# Patient Record
Sex: MALE | Race: WHITE | NOT HISPANIC OR LATINO | Employment: OTHER | URBAN - METROPOLITAN AREA
[De-identification: names, ages, dates, MRNs, and addresses within clinical notes are randomized per-mention and may not be internally consistent; named-entity substitution may affect disease eponyms.]

---

## 2017-02-06 ENCOUNTER — OFFICE VISIT (OUTPATIENT)
Dept: FAMILY MEDICINE | Facility: CLINIC | Age: 72
End: 2017-02-06
Payer: COMMERCIAL

## 2017-02-06 VITALS
DIASTOLIC BLOOD PRESSURE: 74 MMHG | WEIGHT: 237 LBS | HEART RATE: 50 BPM | HEIGHT: 70 IN | TEMPERATURE: 98 F | BODY MASS INDEX: 33.93 KG/M2 | SYSTOLIC BLOOD PRESSURE: 142 MMHG

## 2017-02-06 DIAGNOSIS — T50.905A MEDICATION REACTION, INITIAL ENCOUNTER: Primary | ICD-10-CM

## 2017-02-06 PROCEDURE — 99213 OFFICE O/P EST LOW 20 MIN: CPT | Performed by: FAMILY MEDICINE

## 2017-02-06 NOTE — MR AVS SNAPSHOT
After Visit Summary   2/6/2017    Cas Noriega    MRN: [id number]           Patient Information     Date Of Birth          1945        Visit Information        Provider Department      2/6/2017 4:00 PM Bryce Leigh MD [facility]        Care Instructions    1. Stop the cream for now.    2. Come in one month fasting for skin and exam.    3. Use your regular cream for now.        Follow-ups after your visit        Your next 10 appointments already scheduled     Feb 22, 2017 12:00 PM   (Arrive by 11:45 AM)   Return Visit with Mickey Turpin MD   [facility] Ear Nose and Throat ([facility] [place])    [address]  4th [address]  [place] [address] [phone]            Apr 19, 2017  8:45 AM   LAB with [place] [facility]   [facility] ([facility])    [address] [address]   [phone]           Patient must bring picture ID.  Patient should be prepared to give a urine specimen  Please do not eat 10-12 hours before your appointment if you are coming in fasting for labs on lipids, cholesterol, or glucose (sugar).  Pregnant women should follow their Care Team instructions. Water with medications is okay. Do not drink coffee or other fluids.   If you have concerns about taking  your medications, please ask at office or if scheduling via Metaversumhart, send a message by clicking on Secure Messaging, Message Your Care Team.            Apr 19, 2017  9:00 AM   NM MPI WITH LEXISCAN with [facility], [place] STRESS TEST   [facility] Nuclear Medicine ([facility])    [address] [address]   [phone]           For a ONE day exam: Allow 3-4 hours for test. For a TWO day exam: Allow 2 hours PER day for test.  You may need to stop some medicines before the test. Follow your doctor s orders. - If you take a beta blocker: Follow your doctor s specific instructions on taking it  prior to and on the day of your exam. - If you take Aggrenox or dipyridamole (Persantine, Permole), stop taking it 48 hours before your test. - If you take Viagra, Cialis or Levitra, stop taking it 48 hours before your test. - If you take theophylline or aminophylline, stop taking it 12 hours before your test.  For patients with diabetes: - If you take insulin, call your diabetes care team. Ask if you should take a 1/2 dose the morning of your test. - If you take diabetes medicine by mouth, don t take it on the morning of your test. Bring it with you to take after the test. (If you have questions, call your diabetes care team.)  Do not take nitrates on the day of your test. Do not wear your Nitro-Patch.  Stop all caffeine 12 hours before the test. This includes coffee, tea, soda pop, chocolate and certain medicines (such as Anacin, Excedrin and NoDoz). Also avoid decaf coffee and tea, as these contain small amounts of caffeine.  No alcohol, smoking or other tobacco for 12 hours before the test.  Stop eating 3 hours before the test. You may drink water.  Please wear a loose two-piece outfit. If you will have an exercise test, bring rubber-soled walking shoes.  When you arrive, please tell us if you: - Have diabetes - Are breastfeeding - May be pregnant - Have a pacemaker of ICD (implantable defibrillator).  Please call your Imaging Department at your exam site with any questions.            Apr 26, 2017  2:30 PM   Return Visit with Donny Hua MD   [facility] PHYSICIAN HEART AT [facility] ([facility])    [address] [place] [address]   [phone]            May 10, 2017  9:30 AM   (Arrive by 9:15 AM)   Return Visit with Mickey Turpin MD   [facility] Ear Nose and Throat ([facility] [place])    [address] [address] [phone]              Who to contact     If you have questions or need follow up information  "about today's clinic visit or your schedule please contact [facility] directly at [phone].  Normal or non-critical lab and imaging results will be communicated to you by MyChart, letter or phone within 4 business days after the clinic has received the results. If you do not hear from us within 7 days, please contact the clinic through Offeramahart or phone. If you have a critical or abnormal lab result, we will notify you by phone as soon as possible.  Submit refill requests through [employer]pper or call your pharmacy and they will forward the refill request to us. Please allow 3 business days for your refill to be completed.          Additional Information About Your Visit        [email or web address] Information     [employer]pper gives you secure access to your electronic health record. If you see a primary care provider, you can also send messages to your care team and make appointments. If you have questions, please call your primary care clinic.  If you do not have a primary care provider, please call [phone] and they will assist you.        Care EveryWhere ID     This is your Care EveryWhere ID. This could be used by other organizations to access your [facility] medical records  [id number]        Your Vitals Were     Pulse Temperature Height BMI (Body Mass Index)          50 98  F (36.7  C) (Tympanic) 5' 10\" (1.778 m) 34.01 kg/m2         Blood Pressure from Last 3 Encounters:   02/06/17 142/74   11/28/16 138/72   10/31/16 127/66    Weight from Last 3 Encounters:   02/06/17 237 lb (107.502 kg)   11/28/16 232 lb (105.235 kg)   10/31/16 228 lb (103.42 kg)              Today, you had the following     No orders found for display       Primary Care Provider Office Phone # Fax #    Xu Wells PA-C [phone]       [facility] [address] [address]        Thank you!     Thank you for choosing [facility]  for your care. Our goal is always to provide you " with excellent care. Hearing back from our patients is one way we can continue to improve our services. Please take a few minutes to complete the written survey that you may receive in the mail after your visit with us. Thank you!             Your Updated Medication List - Protect others around you: Learn how to safely use, store and throw away your medicines at www.disposemymeds.org.          This list is accurate as of: 2/6/17  4:39 PM.  Always use your most recent med list.                   Brand Name Dispense Instructions for use    ACETAMINOPHEN PO      Take 500 mg by mouth 2 every 6 hours, morning, noon and night. As Needed for shoulder.       albuterol 108 (90 BASE) MCG/ACT Inhaler    PROAIR HFA/PROVENTIL HFA/VENTOLIN HFA    1 Inhaler    Inhale 2 puffs into the lungs every 6 hours as needed for shortness of breath / dyspnea or wheezing       aspirin 325 MG EC tablet      one daily       atenolol 50 MG tablet    TENORMIN    90 tablet    Take 1 tablet (50 mg) by mouth daily       atorvastatin 80 MG tablet    LIPITOR    90 tablet    Take 1 tablet (80 mg) by mouth daily       carbidopa-levodopa  MG per tablet    SINEMET    180 tablet    Take 2 tablets by mouth daily At bedtime.  Written as 90 day supply.       cefadroxil 500 MG capsule    DURICEF    60 capsule    Take 1 capsule (500 mg) by mouth 2 times daily       DIPHENHYDRAMINE HCL PO      Take by mouth daily       fluorouracil 5 % cream    EFUDEX    40 g    Apply topically 2 times daily       fluticasone 50 MCG/ACT spray    FLONASE    48 g    Spray 1-2 sprays into both nostrils daily       GLUCOSAMINE CHONDR 1500 COMPLX PO      glucosamine(1500mg)plus chondroitin(1200mg) takes 1 tablet daily       hydrochlorothiazide 25 MG tablet    HYDRODIURIL    90 tablet    Take 1 tablet (25 mg) by mouth every morning       * lactobacillus rhamnosus (GG) capsule     90 capsule    Take 1 capsule by mouth daily       * lactobacillus rhamnosus (GG) capsule     90  capsule    Take 1 capsule by mouth daily       lisinopril 40 MG tablet    PRINIVIL/ZESTRIL    90 tablet    Take 1 tablet (40 mg) by mouth daily       nitroglycerin 0.4 MG sublingual tablet    NITROSTAT    30 tablet    Place 1 tablet (0.4 mg) under the tongue every 5 minutes as needed for chest pain       OMEGA-3 FISH OIL PO      Take by mouth 2 times daily (with meals) 1200mg/360mg       order for DME      Equipment being ordered: CPAP Cas Noriega received a [employer] AirSense 10 CPAP. Pressures were set at Auto 11 - 15 cm H2O.       order for DME     1 Device    one digital automatic Home blood pressure machine ( per insurance)       traZODone 100 MG tablet    DESYREL    90 tablet    Take 1 tablet (100 mg) by mouth At Bedtime       * Notice:  This list has 2 medication(s) that are the same as other medications prescribed for you. Read the directions carefully, and ask your doctor or other care provider to review them with you.

## 2017-02-06 NOTE — PROGRESS NOTES
SUBJECTIVE:                                                    Cas Noriega is a 71 year old male who presents to clinic today for the following health issues: he comes with a sore scalp from using his Efudex cream.        * Derm Problem- Has been using Efudex cream on the actinic keratosis on his scalp.  Wondering if he still needs to use it, or if he should have a refill.        Problem list and histories reviewed & adjusted, as indicated.  Additional history: as documented    Patient Active Problem List   Diagnosis     Hypertension goal BP (blood pressure) < 140/90     Acute myocardial infarction of other inferior wall, episode of care unspecified     Hyperlipidemia with target LDL less than 70     Restless legs syndrome (RLS)     Insomnia     Generalized anxiety disorder     Mild major depression (H)     Lumbago     Rotator cuff disorder     Intermittent asthma     Advance Care Planning     Senile nuclear sclerosis     Sensorineural hearing loss, bilateral     S/P shoulder joint replacement     CAD (coronary artery disease)     SEVERE MARTY (obstructive sleep apnea)     Infection of prosthetic shoulder joint (H)     Cochlear implant in place     Encounter for long-term (current) use of antibiotics     Past Surgical History   Procedure Laterality Date     Surgical history of -   3/1985     Bilateral Inguinal Herniorrhaphy     Surgical history of -   5/1997     Breast Lumpectomy-Left-Benign     Surgical history of -   6/10/2005     Rotator Cuff Repair     Surgical history of -   10/2005     L5-S1 decompression and fusion with intrumentation     Surgical history of -   2005     Right Shoulder Arthroplasty     Surgical history of -   2006     PTCA with stent RCA     Surgical history of -        Coronary Artery Bypass Graft     Colonoscopy  8/2009     Benign polyp     Cholecystectomy, laporoscopic  3/2007     Cholecystectomy, Laparoscopic     Phacoemulsification with standard intraocular lens implant  6/4/2012      Procedure:PHACOEMULSIFICATION WITH STANDARD INTRAOCULAR LENS IMPLANT; Left kelman phacoemulsification with intraocular lens implant; Surgeon:DAYDAY HILL; Location:[place]     Phacoemulsification with standard intraocular lens implant  7/26/2012     Procedure: PHACOEMULSIFICATION WITH STANDARD INTRAOCULAR LENS IMPLANT;  Right Kelman phacoemulsification with intraocular lens implant;  Surgeon: Dayday Hill MD;  Location: [place] OR     Release carpal tunnel  10/23/2012     Procedure: RELEASE CARPAL TUNNEL;  Right carpal tunnel release;  Surgeon: Xu Snell MD;  Location: [place]     Release carpal tunnel  11/9/2012     Procedure: RELEASE CARPAL TUNNEL;  Left carpal tunnel release;  Surgeon: Xu Snell MD;  Location: [place]     Arthroplasty shoulder  2/26/2013     Procedure: ARTHROPLASTY SHOULDER;  Left Total Shoulder Arthropasty;  Surgeon: Xu Snell MD;  Location: [place] OR     Colonoscopy N/A 3/25/2015     Procedure: COLONOSCOPY;  Surgeon: Deejay Marie MD;  Location: [place] GI     Remove hardware arthroplasty shoulder  5/2/15     attempted and ended up doing bacteria removal     Implant cochlea with nerve integrity monitor Right 12/17/2015     Procedure: IMPLANT COCHLEA WITH NERVE INTEGRITY MONITOR;  Surgeon: Mickey Turpin MD;  Location:  OR       Social History   Substance Use Topics     Smoking status: Former Smoker -- 1.00 packs/day for 40 years     Types: Cigarettes     Quit date: 01/01/1998     Smokeless tobacco: Never Used     Alcohol Use: 0.0 oz/week     0 Standard drinks or equivalent per week      Comment: 2 glasses a wine/2 cans a beer a month     Family History   Problem Relation Age of Onset     Hypertension Mother      CANCER Father      bone cancer         Current Outpatient Prescriptions   Medication Sig Dispense Refill     atenolol (TENORMIN) 50 MG tablet Take 1 tablet (50 mg) by mouth daily 90 tablet 0     atorvastatin (LIPITOR) 80 MG tablet Take 1 tablet  (80 mg) by mouth daily 90 tablet 0     carbidopa-levodopa (SINEMET)  MG per tablet Take 2 tablets by mouth daily At bedtime.  Written as 90 day supply. 180 tablet 0     hydrochlorothiazide (HYDRODIURIL) 25 MG tablet Take 1 tablet (25 mg) by mouth every morning 90 tablet 0     lisinopril (PRINIVIL,ZESTRIL) 40 MG tablet Take 1 tablet (40 mg) by mouth daily 90 tablet 0     traZODone (DESYREL) 100 MG tablet Take 1 tablet (100 mg) by mouth At Bedtime 90 tablet 0     lactobacillus rhamnosus, GG, (CULTURELL) capsule Take 1 capsule by mouth daily 90 capsule 11     cefadroxil (DURICEF) 500 MG capsule Take 1 capsule (500 mg) by mouth 2 times daily 60 capsule 3     nitroglycerin (NITROSTAT) 0.4 MG SL tablet Place 1 tablet (0.4 mg) under the tongue every 5 minutes as needed for chest pain 30 tablet 12     albuterol (PROAIR HFA, PROVENTIL HFA, VENTOLIN HFA) 108 (90 BASE) MCG/ACT inhaler Inhale 2 puffs into the lungs every 6 hours as needed for shortness of breath / dyspnea or wheezing 1 Inhaler 11     lactobacillus rhamnosus, GG, (CULTURELL) capsule Take 1 capsule by mouth daily 90 capsule 11     fluorouracil (EFUDEX) 5 % cream Apply topically 2 times daily 40 g 3     fluticasone (FLONASE) 50 MCG/ACT nasal spray Spray 1-2 sprays into both nostrils daily 48 g 3     order for DME Equipment being ordered: CPAP Cas Noriega received a [employer] AirSense 10 CPAP. Pressures were set at Auto 11 - 15 cm H2O.       ACETAMINOPHEN PO Take 500 mg by mouth 2 every 6 hours, morning, noon and night. As Needed for shoulder.       Omega-3 Fatty Acids (OMEGA-3 FISH OIL PO) Take by mouth 2 times daily (with meals) 1200mg/360mg       DIPHENHYDRAMINE HCL PO Take by mouth daily       ASPIRIN 325 MG PO TBEC one daily  0     ORDER FOR DME one digital automatic Home blood pressure machine ( per insurance) 1 Device 0     GLUCOSAMINE CHONDR 1500 COMPLX OR glucosamine(1500mg)plus chondroitin(1200mg) takes 1 tablet daily         ROS:  C: NEGATIVE  "for fever, chills, change in weight  CV: NEGATIVE for chest pain, palpitations or peripheral edema    OBJECTIVE:                                                    /74 mmHg  Pulse 50  Temp(Src) 98  F (36.7  C) (Tympanic)  Ht 5' 10\" (1.778 m)  Wt 237 lb (107.502 kg)  BMI 34.01 kg/m2  Body mass index is 34.01 kg/(m^2).  GENERAL: healthy, alert and no distress  RESP: lungs clear to auscultation - no rales, rhonchi or wheezes  CV: regular rate and rhythm, normal S1 S2, no S3 or S4, no murmur, click or rub, no peripheral edema and peripheral pulses strong  SKIN: he has a acute dermatitis over much of scalp anteriorly.         ASSESSMENT/PLAN:                                                      ASSESSMENT/PLAN:      ICD-10-CM    1. Medication reaction, initial encounter T88.7XXA        Patient Instructions   1. Stop the cream for now.    2. Come in one month fasting for skin and exam.    3. Use your regular cream for now.              There are no diagnoses linked to this encounter.        Bryce Leigh MD  [facility]    "

## 2017-02-06 NOTE — NURSING NOTE
"Chief Complaint   Patient presents with     Derm Problem     /74 mmHg  Pulse 50  Temp(Src) 98  F (36.7  C) (Tympanic)  Ht 5' 10\" (1.778 m)  Wt 237 lb (107.502 kg)  BMI 34.01 kg/m2 Estimated body mass index is 34.01 kg/(m^2) as calculated from the following:    Height as of this encounter: 5' 10\" (1.778 m).    Weight as of this encounter: 237 lb (107.502 kg).  bp completed using cuff size: large      Health Maintenance that is potentially due pending provider review:  NONE    N/a  Rowena PEACE MA        "

## 2017-02-06 NOTE — PATIENT INSTRUCTIONS
1. Stop the cream for now.    2. Come in one month fasting for skin and exam.    3. Use your regular cream for now.

## 2017-02-22 ENCOUNTER — OFFICE VISIT (OUTPATIENT)
Dept: OTOLARYNGOLOGY | Facility: CLINIC | Age: 72
End: 2017-02-22

## 2017-02-22 VITALS — HEIGHT: 69 IN | BODY MASS INDEX: 35.55 KG/M2 | WEIGHT: 240 LBS

## 2017-02-22 DIAGNOSIS — H90.3 SENSORINEURAL HEARING LOSS, BILATERAL: Primary | ICD-10-CM

## 2017-02-22 ASSESSMENT — PAIN SCALES - GENERAL: PAINLEVEL: NO PAIN (0)

## 2017-02-22 NOTE — PATIENT INSTRUCTIONS
You will receive a call from the surgery scheduler regarding a surgery date once we have authorization from your insurance- this usually takes 2-4 weeks to hear back from the insurance company.  You will have a consult with our preoperative assessment  Clinic. The surgery scheduler will schedule this when your surgery is scheduled.   Patient to review pre operative information.    Make sure you are up to date with the Pneumococcal/Prevnar vaccine-- see attached handout. Please make sure you have had at least one of the vaccines prior to surgery. It is ok to receive the second vaccine after the surgery.     Patient to avoid blood thinning medications 1 week prior to surgery (Ibuprofen, Aleve, Aspirin, fish oil )   Use the antiseptic scrub as directed.   You will need  to schedule a follow up appointment for 3 weeks after surgery.    Patient to call the ENT clinic with further questions or concerns:   ENT Triage Nurse  [phone] option 3  ENT appointment scheduling [phone] option 1  ENT surgery scheduling, [place] [phone]  ENT Nurse Leilani [phone]

## 2017-02-22 NOTE — LETTER
2/22/2017       RE: Cas Noriega  [id number]  [place] [address]     Dear Colleague,    Thank you for referring your patient, Cas Noriega, to the [facility] EAR NOSE AND THROAT at [facility]. Please see a copy of my visit note below.    HISTORY OF PRESENT ILLNESS:  Mr. Noriega is a 71-year-old man who has a right [employer] cochlear implant and says that he is doing great with it but that he still misses things, that he has trouble hearing, particularly in background and that if people speak too quickly that he has to ask them to repeat.  The patient is anxious to get another cochlear implant for the left side.  Angelina Jerome has seen him.  He has gone over the issues with him and he is a candidate.  His scans are from 2015 and they look normal.  There is no contraindication here.  Medically he has an extensive medical history including the fact that he has sleep apnea and that he has hypertension.  He has had a history of an acute MI.  He has restless leg.  He has major depression.  He has had back problems and has had shoulder problems.  The patient is on a long list of medications, which I have gone over today.  He appears to be stable medically.  He certainly is able to walk up and down steps and has been living in active life.      PHYSICAL EXAMINATION:  Examination today shows that his ears are normal.  Facial nerves are House-Brackmann I bilaterally.  The right cochlear implant is in place.  The incision line is clean and dry.  I do not see any other abnormalities.       IMPRESSION AND PLAN:  My overall impression is that he is a good candidate for a left Advanced [employer]ion[employer] cochlear implant.  We talked about the risks.  He did not have any questions today because he said he remembers the discussion from the first time through.  He is anxious to go ahead and get it scheduled now so that he can get his rehab done before next winter.          [id number]         Again, thank you for allowing me to participate in the care of your patient.      Sincerely,    Mickey Turpin MD

## 2017-02-22 NOTE — NURSING NOTE
Teaching Flowsheet - ENT   Relevant Diagnosis: hearing loss  Teaching Topic: left cochlear implant     Person(s) involved in teaching:  Patient    Motivation Level:  Asks Questions:  yes  Eager to Learn:yes  Cooperative:   yes  Receptive (willing/able to accept information):   yes  Comments: Reviewed purpose for pre-op  H and P-- PAC consult needed, NPO prior to surgery, pre-op scrub (given scrub), attached information on PPV #23 vaccine to the H and P.  Reviewed post op cares including wound care, diet, activity,  pain management,  and  post op visit.     Demonstrates understanding of the following:  Reason for the appointment, diagnosis and treatment plan:  yes  Knowledge of proper use of medications and conditions for which they are ordered (with special attention to potential side effects or drug interactions):   stop aspirin, fish oil and vit E 1 week before surgery.  Which situations necessitate calling provider and whom to contact:   yes  Other:      Comments:      Proper use and care of  (medical equip, care aids, etc.):     Nutritional needs and diet plan:   yes  Pain management techniques:   yes  Patient instructed on hand hygiene:  yes  How and/when to access community resources:   yes     Infection Prevention:  Patient   demonstrates understanding of the following:  Surgical procedure site care taught yes  Signs and symptoms of infection taught yes  Wound care taught yes  Instructional Materials Used/Given: pre-op booklet, CI handout and verbal instruction.

## 2017-03-03 DIAGNOSIS — G47.33 OSA (OBSTRUCTIVE SLEEP APNEA): Primary | ICD-10-CM

## 2017-03-13 DIAGNOSIS — I25.10 CORONARY ARTERY DISEASE INVOLVING NATIVE CORONARY ARTERY OF NATIVE HEART WITHOUT ANGINA PECTORIS: ICD-10-CM

## 2017-03-13 DIAGNOSIS — I10 ESSENTIAL HYPERTENSION WITH GOAL BLOOD PRESSURE LESS THAN 140/90: ICD-10-CM

## 2017-03-13 DIAGNOSIS — E78.5 HYPERLIPIDEMIA WITH TARGET LDL LESS THAN 70: ICD-10-CM

## 2017-03-13 LAB
ALT SERPL W P-5'-P-CCNC: 38 U/L (ref 0–70)
ANION GAP SERPL CALCULATED.3IONS-SCNC: 8 MMOL/L (ref 3–14)
BUN SERPL-MCNC: 14 MG/DL (ref 7–30)
CALCIUM SERPL-MCNC: 8.5 MG/DL (ref 8.5–10.1)
CHLORIDE SERPL-SCNC: 105 MMOL/L (ref 94–109)
CHOLEST SERPL-MCNC: 144 MG/DL
CO2 SERPL-SCNC: 28 MMOL/L (ref 20–32)
CREAT SERPL-MCNC: 0.77 MG/DL (ref 0.66–1.25)
GFR SERPL CREATININE-BSD FRML MDRD: NORMAL ML/MIN/1.7M2
GLUCOSE SERPL-MCNC: 84 MG/DL (ref 70–99)
HDLC SERPL-MCNC: 42 MG/DL
LDLC SERPL CALC-MCNC: 75 MG/DL
NONHDLC SERPL-MCNC: 102 MG/DL
POTASSIUM SERPL-SCNC: 4.3 MMOL/L (ref 3.4–5.3)
SODIUM SERPL-SCNC: 141 MMOL/L (ref 133–144)
TRIGL SERPL-MCNC: 136 MG/DL

## 2017-03-13 PROCEDURE — 84460 ALANINE AMINO (ALT) (SGPT): CPT | Performed by: PHYSICIAN ASSISTANT

## 2017-03-13 PROCEDURE — 36415 COLL VENOUS BLD VENIPUNCTURE: CPT | Performed by: PHYSICIAN ASSISTANT

## 2017-03-13 PROCEDURE — 80061 LIPID PANEL: CPT | Performed by: PHYSICIAN ASSISTANT

## 2017-03-13 PROCEDURE — 80048 BASIC METABOLIC PNL TOTAL CA: CPT | Performed by: PHYSICIAN ASSISTANT

## 2017-03-14 ENCOUNTER — RADIANT APPOINTMENT (OUTPATIENT)
Dept: GENERAL RADIOLOGY | Facility: CLINIC | Age: 72
End: 2017-03-14
Attending: FAMILY MEDICINE
Payer: COMMERCIAL

## 2017-03-14 ENCOUNTER — OFFICE VISIT (OUTPATIENT)
Dept: FAMILY MEDICINE | Facility: CLINIC | Age: 72
End: 2017-03-14
Payer: COMMERCIAL

## 2017-03-14 VITALS
BODY MASS INDEX: 35.55 KG/M2 | SYSTOLIC BLOOD PRESSURE: 121 MMHG | HEIGHT: 69 IN | TEMPERATURE: 98.3 F | DIASTOLIC BLOOD PRESSURE: 59 MMHG | HEART RATE: 47 BPM | WEIGHT: 240 LBS

## 2017-03-14 DIAGNOSIS — J45.20 INTERMITTENT ASTHMA, UNCOMPLICATED: ICD-10-CM

## 2017-03-14 DIAGNOSIS — M25.511 CHRONIC RIGHT SHOULDER PAIN: ICD-10-CM

## 2017-03-14 DIAGNOSIS — J45.20 INTERMITTENT ASTHMA, UNCOMPLICATED: Primary | ICD-10-CM

## 2017-03-14 DIAGNOSIS — G89.29 CHRONIC RIGHT SHOULDER PAIN: ICD-10-CM

## 2017-03-14 PROCEDURE — 99214 OFFICE O/P EST MOD 30 MIN: CPT | Performed by: FAMILY MEDICINE

## 2017-03-14 PROCEDURE — 71020 XR CHEST 2 VW: CPT

## 2017-03-14 PROCEDURE — 93000 ELECTROCARDIOGRAM COMPLETE: CPT | Performed by: FAMILY MEDICINE

## 2017-03-14 NOTE — PATIENT INSTRUCTIONS
1. Good luck on surgery.    2. Your chest looks good.    3. EKG shows slow heart rate but this is from your medication.    4. Labs look good.

## 2017-03-14 NOTE — NURSING NOTE
"Chief Complaint   Patient presents with     Physical       Initial /59  Pulse (!) 47  Temp 98.3  F (36.8  C) (Tympanic)  Ht 5' 8.5\" (1.74 m)  Wt 240 lb (108.9 kg)  BMI 35.96 kg/m2 Estimated body mass index is 35.96 kg/(m^2) as calculated from the following:    Height as of this encounter: 5' 8.5\" (1.74 m).    Weight as of this encounter: 240 lb (108.9 kg).  Medication Reconciliation: complete    "

## 2017-03-14 NOTE — MR AVS SNAPSHOT
After Visit Summary   3/14/2017    Cas Noriega    MRN: [id number]           Patient Information     Date Of Birth          1945        Visit Information        Provider Department      3/14/2017 10:00 AM Bryce Leigh MD [facility]        Today's Diagnoses     Intermittent asthma, uncomplicated    -  1    Chronic right shoulder pain          Care Instructions    1. Good luck on surgery.    2. Your chest looks good.    3. EKG shows slow heart rate but this is from your medication.    4. Labs look good.         Follow-ups after your visit        Additional Services     PHYSICAL THERAPY REFERRAL       *This therapy referral will be filtered to a centralized scheduling office at [facility] and the patient will receive a call to schedule an appointment at a [place] location most convenient for them. *     [facility] provides Physical Therapy evaluation and treatment and many specialty services across the [place] system.  If requesting a specialty program, please choose from the list below.    If you have not heard from the scheduling office within 2 business days, please call [phone] for all locations, with the exception of [place], please call [phone].  Treatment: Evaluation & Treatment  Special Instructions/Modalities: shoulder strengthing   Special Programs:     Please be aware that coverage of these services is subject to the terms and limitations of your health insurance plan.  Call member services at your health plan with any benefit or coverage questions.      **Note to Provider:  If you are referring outside of [place] for the therapy appointment, please list the name of the location in the  special instructions  above, print the referral and give to the patient to schedule the appointment.                  Your next 10 appointments already scheduled     Mar 27, 2017   Procedure with Mickey Turpin  MD   [facility] [place], Same Day Surgery (--)    [address]  [place] [address]   [phone]            Apr 19, 2017  8:45 AM CDT   LAB with [place] [facility]   [facility]    [address] [address]   [phone]           Patient must bring picture ID.  Patient should be prepared to give a urine specimen  Please do not eat 10-12 hours before your appointment if you are coming in fasting for labs on lipids, cholesterol, or glucose (sugar).  Pregnant women should follow their Care Team instructions. Water with medications is okay. Do not drink coffee or other fluids.   If you have concerns about taking  your medications, please ask at office or if scheduling via [employer], send a message by clicking on Secure Messaging, Message Your Care Team.            Apr 19, 2017  9:00 AM CDT   [facility] [place] [facility]   [facility] [place] [facility] ([facility])    [address] [address]   [phone]           For a ONE day exam: Allow 3-4 hours for test. For a TWO day exam: Allow 2 hours PER day for test.  You may need to stop some medicines before the test. Follow your doctor s orders. - If you take a beta blocker: Follow your doctor s specific instructions on taking it prior to and on the day of your exam. - If you take Aggrenox or dipyridamole (Persantine, Permole), stop taking it 48 hours before your test. - If you take Viagra, Cialis or Levitra, stop taking it 48 hours before your test. - If you take theophylline or aminophylline, stop taking it 12 hours before your test.  For patients with diabetes: - If you take insulin, call your diabetes care team. Ask if you should take a 1/2 dose the morning of your test. - If you take diabetes medicine by mouth, don t take it on the morning of your test. Bring it with you to take after the test. (If you have questions, call your diabetes care team.)  Do not take  nitrates on the day of your test. Do not wear your Nitro-Patch.  Stop all caffeine 12 hours before the test. This includes coffee, tea, soda pop, chocolate and certain medicines (such as Anacin, Excedrin and NoDoz). Also avoid decaf coffee and tea, as these contain small amounts of caffeine.  No alcohol, smoking or other tobacco for 12 hours before the test.  Stop eating 3 hours before the test. You may drink water.  Please wear a loose two-piece outfit. If you will have an exercise test, bring rubber-soled walking shoes.  When you arrive, please tell us if you: - Have diabetes - Are breastfeeding - May be pregnant - Have a pacemaker of ICD (implantable defibrillator).  Please call your Imaging Department at your exam site with any questions.            Apr 21, 2017 11:00 AM CDT   (Arrive by 10:45 AM)   Return Visit with MD DEANN Perez [facility] ([facility])    [address] [place] [id number] [phone]            Apr 21, 2017  1:00 PM CDT   Cochlear Implant Return with Vladislav Noble [facility] ([facility])    [address] [place] [id number] [phone]            Apr 26, 2017  2:30 PM CDT   Return Visit with Donny Hua MD   [facility] AT [facility] ([facility])    [address] [place] [address]   [phone]            Apr 27, 2017  2:30 PM CDT   Cochlear Implant Return with Vladislav Noble [facility] ([facility])    [address] [place] [address]   [phone]            May 10, 2017 10:00 AM CDT   Cochlear Implant Return with Vladislav Noble [facility] ([facility])    [address] [place] [address]   [phone]            May 23, 2017 10:00 AM CDT   (Arrive by  "9:45 AM)   Evaluation with GAURANG Tomlinson   [facility] ([facility])    [address]  4th Floor  [facility] [address] [phone]              Who to contact     If you have questions or need follow up information about today's clinic visit or your schedule please contact [facility] directly at [phone].  Normal or non-critical lab and imaging results will be communicated to you by [employer], letter or phone within 4 business days after the clinic has received the results. If you do not hear from us within 7 days, please contact the clinic through [employer] or phone. If you have a critical or abnormal lab result, we will notify you by phone as soon as possible.  Submit refill requests through [facility] or call your pharmacy and they will forward the refill request to us. Please allow 3 business days for your refill to be completed.          Additional Information About Your Visit        [employer] Information     [facility] gives you secure access to your electronic health record. If you see a primary care provider, you can also send messages to your care team and make appointments. If you have questions, please call your primary care clinic.  If you do not have a primary care provider, please call [phone] and they will assist you.        Care EveryWhere ID     This is your Care EveryWhere ID. This could be used by other organizations to access your [place] medical records  [id number]        Your Vitals Were     Pulse Temperature Height BMI (Body Mass Index)          47 98.3  F (36.8  C) (Tympanic) 5' 8.5\" (1.74 m) 35.96 kg/m2         Blood Pressure from Last 3 Encounters:   03/14/17 121/59   02/06/17 142/74   11/28/16 138/72    Weight from Last 3 Encounters:   03/14/17 240 lb (108.9 kg)   02/22/17 240 lb (108.9 kg)   02/06/17 237 lb (107.5 kg)              We Performed the Following     EKG 12-lead complete w/read - Clinics     PHYSICAL THERAPY " REFERRAL        Primary Care Provider Office Phone # Fax #    Xu Wells PA-C [phone]       [facility] [address] [place] [address]        Thank you!     Thank you for choosing [facility]  for your care. Our goal is always to provide you with excellent care. Hearing back from our patients is one way we can continue to improve our services. Please take a few minutes to complete the written survey that you may receive in the mail after your visit with us. Thank you!             Your Updated Medication List - Protect others around you: Learn how to safely use, store and throw away your medicines at www.disposemymeds.org.          This list is accurate as of: 3/14/17 11:37 AM.  Always use your most recent med list.                   Brand Name Dispense Instructions for use    ACETAMINOPHEN PO      Take 500 mg by mouth 2 every 6 hours, morning, noon and night. As Needed for shoulder.       albuterol 108 (90 BASE) MCG/ACT Inhaler    PROAIR HFA/PROVENTIL HFA/VENTOLIN HFA    1 Inhaler    Inhale 2 puffs into the lungs every 6 hours as needed for shortness of breath / dyspnea or wheezing       aspirin 325 MG EC tablet      one daily       atenolol 50 MG tablet    TENORMIN    90 tablet    Take 1 tablet (50 mg) by mouth daily       atorvastatin 80 MG tablet    LIPITOR    90 tablet    Take 1 tablet (80 mg) by mouth daily       carbidopa-levodopa  MG per tablet    SINEMET    180 tablet    Take 2 tablets by mouth daily At bedtime.  Written as 90 day supply.       cefadroxil 500 MG capsule    DURICEF    60 capsule    Take 1 capsule (500 mg) by mouth 2 times daily       DIPHENHYDRAMINE HCL PO      Take by mouth daily       fluorouracil 5 % cream    EFUDEX    40 g    Apply topically 2 times daily       fluticasone 50 MCG/ACT spray    FLONASE    48 g    Spray 1-2 sprays into both nostrils daily       GLUCOSAMINE CHONDR 1500 COMPLX PO      glucosamine(1500mg)plus  chondroitin(1200mg) takes 1 tablet daily       hydrochlorothiazide 25 MG tablet    HYDRODIURIL    90 tablet    Take 1 tablet (25 mg) by mouth every morning       lactobacillus rhamnosus (GG) capsule     90 capsule    Take 1 capsule by mouth daily       lisinopril 40 MG tablet    PRINIVIL/ZESTRIL    90 tablet    Take 1 tablet (40 mg) by mouth daily       nitroglycerin 0.4 MG sublingual tablet    NITROSTAT    30 tablet    Place 1 tablet (0.4 mg) under the tongue every 5 minutes as needed for chest pain       OMEGA-3 FISH OIL PO      Take by mouth 2 times daily (with meals) 1200mg/360mg       order for DME      Equipment being ordered: CPAP Cas Noriega received a ActualSun AirSense 10 CPAP. Pressures were set at Auto 11 - 15 cm H2O.       order for DME     1 Device    one digital automatic Home blood pressure machine ( per insurance)       traZODone 100 MG tablet    DESYREL    90 tablet    Take 1 tablet (100 mg) by mouth At Bedtime

## 2017-03-14 NOTE — PROGRESS NOTES
SUBJECTIVE:                                                            Cas Noriega is a 71 year old male who presents for Preventive Visit.  He comes in today for preoperative examination prior to his cochlear implant on the left side. He is doing very well and has no ongoing complaints.  Are you in the first 12 months of your Medicare coverage?  No    Physical   Annual:     Getting at least 3 servings of Calcium per day::  NO    Bi-annual eye exam::  NO    Dental care twice a year::  NO    Sleep apnea or symptoms of sleep apnea::  Sleep apnea    Diet::  Regular (no restrictions)    Frequency of exercise::  None    Taking medications regularly::  Yes    Medication side effects::  Not applicable    Additional concerns today::  YES      COGNITIVE SCREEN  1) Repeat 3 items (Banana, Sunrise, Chair)    2) Clock draw: NORMAL  3) 3 item recall: Recalls 3 objects  Results: 3 items recalled: COGNITIVE IMPAIRMENT LESS LIKELY    Mini-CogTM Copyright VIRGIE Oneal. Licensed by the author for use in [facility]; reprinted with permission (parvin@[facility]). All rights reserved.        Reviewed and updated as needed this visit by clinical staff         Reviewed and updated as needed this visit by Provider        Social History   Substance Use Topics     Smoking status: Former Smoker     Packs/day: 1.00     Years: 40.00     Types: Cigarettes     Start date: 8/8/1956     Quit date: 1/1/1998     Smokeless tobacco: Never Used     Alcohol use 0.0 oz/week      Comment: 2 glasses a wine/2 cans a beer a month       The patient does not drink >3 drinks per day nor >7 drinks per week.            Today's PHQ-2 Score:   PHQ-2 ( 1999 [employer]) 3/11/2017   Q1: Little interest or pleasure in doing things -   Q2: Feeling down, depressed or hopeless -   PHQ-2 Score -   Little interest or pleasure in doing things Not at all   Feeling down, depressed or hopeless Not at all   PHQ-2 Score 0       Do you feel safe in your environment -  "Yes    Do you have a Health Care Directive?: Yes: Advance Directive has been received and scanned.    Current providers sharing in care for this patient include:   Patient Care Team:  Xu Wells PA-C as PCP - General (Physician Assistant)  Mickey Turpin MD as MD (Otolaryngology)  Macrina Barriga AuD as Audiologist (Audiology)      Hearing impairment: Yes,     Ability to successfully perform activities of daily living: Yes, no assistance needed     Fall risk:       Home safety:  none identified  click delete button to remove this line now    The following health maintenance items are reviewed in Epic and correct as of today:  Health Maintenance   Topic Date Due     ASTHMA ACTION PLAN Q1 YR (NO INBASKET)  12/17/2015     FALL RISK ASSESSMENT  03/20/2016     DEPRESSION ACTION PLAN Q1 YR (NO INBASKET)  03/20/2016     ASTHMA CONTROL TEST Q6 MOS (NO INBASKET)  07/12/2016     PHQ-9 Q6 MONTHS (NO INBASKET)  07/12/2016     INFLUENZA VACCINE (SYSTEM ASSIGNED)  09/01/2017     TETANUS IMMUNIZATION (SYSTEM ASSIGNED)  10/18/2018     ADVANCE DIRECTIVE PLANNING Q5 YRS (NO INBASKET)  11/25/2020     LIPID SCREEN Q5 YR MALE (SYSTEM ASSIGNED)  03/13/2022     COLON CANCER SCREEN (SYSTEM ASSIGNED)  03/25/2025     PNEUMOCOCCAL  Completed     AORTIC ANEURYSM SCREENING (SYSTEM ASSIGNED)  Completed              ROS:  C: NEGATIVE for fever, chills, change in weight  E/M: NEGATIVE for ear, mouth and throat problems  R: NEGATIVE for significant cough or SOB  CV: NEGATIVE for chest pain, palpitations or peripheral edema    Problem list, Medication list, Allergies, and Medical/Social/Surgical histories reviewed in [facility] and updated as appropriate.  OBJECTIVE:                                                            There were no vitals taken for this visit. Estimated body mass index is 35.96 kg/(m^2) as calculated from the following:    Height as of 2/22/17: 5' 8.5\" (1.74 m).    Weight as of 2/22/17: 240 lb (108.9 kg).  EXAM:   GENERAL: " "healthy, alert and no distress  NECK: no adenopathy, no asymmetry, masses, or scars and thyroid normal to palpation  RESP: lungs clear to auscultation - no rales, rhonchi or wheezes  CV: regular rate and rhythm, normal S1 S2, no S3 or S4, no murmur, click or rub, no peripheral edema and peripheral pulses strong  ABDOMEN: soft, nontender, no hepatosplenomegaly, no masses and bowel sounds normal  MS: no gross musculoskeletal defects noted, no edema    ASSESSMENT / PLAN:                                                            1. Intermittent asthma, uncomplicated  Stable. His chest x-ray today looks normal. He does have a bradycardia with that but his EKG shows sinus bradycardia.  - XR Chest 2 Views; Future  - EKG 12-lead complete w/read - Clinics    2. Chronic right shoulder pain  Chronic right shoulder weakness will start PT- PHYSICAL THERAPY REFERRAL    End of Life Planning:  Patient currently has an advanced directive:   Normal preoperative examination. Patient's EKG and chest x-ray looked fine. Should be fine for a general anesthetic.  COUNSELING:          Estimated body mass index is 35.96 kg/(m^2) as calculated from the following:    Height as of 2/22/17: 5' 8.5\" (1.74 m).    Weight as of 2/22/17: 240 lb (108.9 kg).     reports that he quit smoking about 19 years ago. His smoking use included Cigarettes. He started smoking about 60 years ago. He has a 40.00 pack-year smoking history. He has never used smokeless tobacco.      Appropriate preventive services were discussed with this patient, including applicable screening as appropriate for cardiovascular disease, diabetes, osteopenia/osteoporosis, and glaucoma.  As appropriate for age/gender, discussed screening for colorectal cancer, prostate cancer, breast cancer, and cervical cancer. Checklist reviewing preventive services available has been given to the patient.    Reviewed patients plan of care and provided an AVS. The  for Cas meets the Care Plan " requirement. This Care Plan has been established and reviewed with the .    Counseling Resources:  ATP IV Guidelines  Pooled Cohorts Equation Calculator  Breast Cancer Risk Calculator  FRAX Risk Assessment  ICSI Preventive Guidelines  Dietary Guidelines for Americans, 2010  USDA's MyPlate  ASA Prophylaxis  Lung CA Screening    Bryce Leigh MD  [facility]  Answers for HPI/ROS submitted by the patient on 3/11/2017   Q1: Little interest or pleasure in doing things: 0=Not at all  Q2: Feeling down, depressed or hopeless: 0=Not at all  PHQ-2 Score: 0

## 2017-03-16 ENCOUNTER — HOSPITAL ENCOUNTER (OUTPATIENT)
Dept: PHYSICAL THERAPY | Facility: CLINIC | Age: 72
Setting detail: THERAPIES SERIES
End: 2017-03-16
Attending: FAMILY MEDICINE
Payer: MEDICARE

## 2017-03-16 PROCEDURE — 40000718 ZZHC STATISTIC PT DEPARTMENT ORTHO VISIT

## 2017-03-16 PROCEDURE — G8984 CARRY CURRENT STATUS: HCPCS | Mod: GP,CK

## 2017-03-16 PROCEDURE — G8985 CARRY GOAL STATUS: HCPCS | Mod: GP,CJ

## 2017-03-16 PROCEDURE — 97110 THERAPEUTIC EXERCISES: CPT | Mod: GP

## 2017-03-16 PROCEDURE — 97162 PT EVAL MOD COMPLEX 30 MIN: CPT | Mod: GP

## 2017-03-16 PROCEDURE — 97535 SELF CARE MNGMENT TRAINING: CPT | Mod: GP

## 2017-03-16 NOTE — PROGRESS NOTES
03/16/17 0800   General Information   Type of Visit Initial OP Ortho PT Evaluation   Start of Care Date 03/16/17   Referring Physician Bryce Leigh MD    Patient/Family Goals Statement washing back, reaching high shelf, pushing, put on deoderant   Orders Evaluate and Treat   Date of Order 03/14/17   Insurance Type Medicare;Other   Insurance Comments/Visits Authorized [facility]-full cap, NO IONTOPHORESIS/group therapy   Medical Diagnosis Chronic right shoulder pain   Surgical/Medical history reviewed Yes  ('15: R and L TSA then RTSA, '02: lump L breast, '05: bipass)   Precautions/Limitations no known precautions/limitations   General Information Comments PMH: 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, Infection of prosthetic shoulder joint, Sternotomy, Lumbago, B Sensorineural hearing loss, Cochlear implant in place, SEVERE MARTY, Intermittent asthma, Hyperlipidemia, Hypertension, Acute myocardial infarction of other inferior wall, CAD (coronary artery disease), RLS, CASPER, depression, Encounter for long-term (current) use of antibiotics, Insomnia, Senile nuclear sclerosis   Body Part(s)   Body Part(s) Shoulder   Presentation and Etiology   Pertinent history of current problem (include personal factors and/or comorbidities that impact the POC) Most difficulty with washing back, reaching high shelf, pushing, put on deoderant. Pain with movement and changes in R shoulder with position. Able to cut/stack firewood at a ht no higher than R elbow. When pt got R TSA to ReverseTSA with hip bone because of infection and bone loss in shoulder July 2015. Pt is now on 2x/day meds to avoid infection. With a dowel and lying on back, pt can get as high at L shoulder with elevation   Impairments A. Pain;B. Decreased WB tolerance;D. Decreased ROM;F. Decreased strength and endurance   Functional Limitations perform desired leisure / sports activities   Symptom Location R shoulder position/motion  dependent   How/Where did it occur From insidious onset   Onset date of current episode/exacerbation 12/16/16   Chronicity Chronic  (2015: recent decreased in function and pain)   Pain rating (0-10 point scale) Best (/10);Worst (/10)   Best (/10) 0   Worst (/10) 6   Pain quality A. Sharp;C. Aching   Frequency of pain/symptoms B. Intermittent   Pain/symptoms exacerbated by M. Other   Pain exacerbation comment reaching   Pain/symptoms eased by C. Rest   Current / Previous Interventions   Diagnostic Tests: X-ray   X-ray Results Results   X-ray results XR CHEST 2 VW 3/14/2017 11:19 AM: The lungs appear clear. No pleural effusions. Sternotomy wires are noted. 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 are noted.   Current Level of Function   Patient role/employment history F. Retired   Fall Risk Screen   Fall screen completed by PT   Per patient - Fall 2 or more times in past year? No   Per patient - Fall with injury in past year? No   Is patient a fall risk? No   Functional Scales   Functional Scales Other   Other Scales  SPADI: 39.2%   Shoulder Objective Findings   Side (if bilateral, select both right and left) Right;Left   Observation leaning on R elbow in sitting, painful hand shake on R UE, pt frequently moving eye brows   Palpation crepitus with ER in R RTSA   Right Shoulder Flexion AROM 65, pain   Right Shoulder Flexion PROM will assess at future visit as needed   Right Shoulder Abduction AROM 51   Right Shoulder Abduction PROM will assess at future visit as needed   Right Shoulder ER AROM at 0* abd: 42   Right Shoulder ER PROM will assess at future visit as needed   Right Shoulder IR AROM at 0* abd: to stomach   Right Shoulder IR PROM will assess at future visit as needed   Right Shoulder Flexion Strength at 0* abd: 4   Right Shoulder Abduction Strength at 0* abd: 3-   Right Shoulder ER Strength at 0* abd: 3-   Right Shoulder IR Strength at 0* abd: 3   Right Shoulder Extension Strength at  0* abd: 5   Left Shoulder Flexion AROM 153   Left Shoulder Abduction AROM 160   Left Shoulder ER AROM 62   Left Shoulder IR AROM 52   Left Shoulder Flexion Strength 5   Left Shoulder Abduction Strength 4   Left Shoulder ER Strength 3+   Left Shoulder IR Strength 4   Left Shoulder Extension Strength 5   Planned Therapy Interventions   Planned Therapy Interventions ADL retraining;fine motor coordination training;joint mobilization;manual therapy;motor coordination training;neuromuscular re-education;ROM;strengthening;stretching   Planned Modality Interventions   Planned Modality Interventions TENS;Cryotherapy;Hot packs   Clinical Impression   Criteria for Skilled Therapeutic Interventions Met yes, treatment indicated   PT Diagnosis Chronic R shoulder pain   Influenced by the following impairments hypermobility, strength, ROM   Functional limitations due to impairments washing back, reaching high shelf, pushing, put on deoderant   Clinical Presentation Evolving/Changing   Clinical Presentation Rationale (-) long term and future antibiotics, past bone infection with bone loss in R shoulder, multiple co-morbidities, severe hearing impairment, variable sx per positioning/activity   Clinical Decision Making (Complexity) Moderate complexity   Therapy Frequency 2 times/Week   Predicted Duration of Therapy Intervention (days/wks) 8 weeks   Risk & Benefits of therapy have been explained Yes   Patient, Family & other staff in agreement with plan of care Yes   Clinical Impression Comments Pt is a pleasant man with many stories to tell. Pt has no hearing in L ear with partial hearing in R ear per internal cochlear implant. Pt prefers to read lips with face to face communication. Pt presents with severe weakness in R shoulder per MMT, significant AROM restriction per mobility testing, hypermobility per palpation, WFL PROM for reverse TSA per subjective report (more assessment in future as needed). Pt is appropriate for skilled PT  to address impairments and improve function in R shoulder.   Education Assessment   Preferred Learning Style Demonstration;Pictures/video   Barriers to Learning Hearing  (B Sensorineural hearing loss, Cochlear implant )   ORTHO GOALS   PT Ortho Eval Goals 1;2;3;4   Ortho Goal 1   Goal Identifier don deodorant   Goal Description Following PT interventions, pt will increase R shoulder abd AROM to 90* to be able to don deodorant with less difficulty   Target Date 03/30/17   Ortho Goal 2   Goal Identifier Reach high shelf   Goal Description Following PT interventions, pt will increase R shoulder flex AROM to 100* to be able to reach high shelf with less difficulty   Target Date 04/13/17   Ortho Goal 3   Goal Identifier push   Goal Description Following PT interventions, pt will increase R shoulder ER MMT at 0* abd to 4/5 grade to be able to push with less pain   Target Date 04/27/17   Ortho Goal 4   Goal Identifier wash back   Goal Description Following PT interventions, pt will score 20% on SPADI for MCID and less pain with washing back when using R shoulder   Target Date 05/11/17   Total Evaluation Time   Total Evaluation Time 34min   Therapy Certification   Certification date from 03/16/17   Certification date to 05/11/17   Medical Diagnosis Chronic right shoulder pain   Carlton Graves, PT, DPT   Doctor of Physical Therapy # [id number]  [facility]  [phone]

## 2017-03-16 NOTE — PROGRESS NOTES
[facility]          OUTPATIENT PHYSICAL THERAPY ORTHOPEDIC EVALUATION  PLAN OF TREATMENT FOR OUTPATIENT REHABILITATION  (COMPLETE FOR INITIAL CLAIMS ONLY)  Patient's Last Name, First Name, M.I.  YOB: 1945  Cas Noriega    Provider s Name:  [facility]   Medical Record No.  [id number]   Start of Care Date:  03/16/17   Onset Date:  12/16/16   Type:     _X__PT   ___OT   ___SLP Medical Diagnosis:  Chronic right shoulder pain     PT Diagnosis:  Chronic R shoulder pain   Visits from SOC:  1      _________________________________________________________________________________  Plan of Treatment/Functional Goals:  ADL retraining, fine motor coordination training, joint mobilization, manual therapy, motor coordination training, neuromuscular re-education, ROM, strengthening, stretching     TENS, Cryotherapy, Hot packs     Goals  Goal Identifier: don deodorant  Goal Description: Following PT interventions, pt will increase R shoulder abd AROM to 90* to be able to don deodorant with less difficulty  Target Date: 03/30/17    Goal Identifier: Reach high shelf  Goal Description: Following PT interventions, pt will increase R shoulder flex AROM to 100* to be able to reach high shelf with less difficulty  Target Date: 04/13/17    Goal Identifier: push  Goal Description: Following PT interventions, pt will increase R shoulder ER MMT at 0* abd to 4/5 grade to be able to push with less pain  Target Date: 04/27/17    Goal Identifier: wash back  Goal Description: Following PT interventions, pt will score 20% on SPADI for MCID and less pain with washing back when using R shoulder  Target Date: 05/11/17                             Therapy Frequency:  2 times/Week  Predicted Duration of Therapy Intervention:  8 weeks    Carlton Graves, PT                 I CERTIFY THE NEED FOR THESE SERVICES FURNISHED UNDER        THIS PLAN OF TREATMENT AND WHILE UNDER MY CARE     (Physician  co-signature of this document indicates review and certification of the therapy plan).                       Certification Date From:  03/16/17   Certification Date To:  05/11/17    Referring Provider:  Bryce Leigh MD     Initial Assessment        See Epic Evaluation Start of Care Date: 03/16/17             Thank You,    Carlton Graves, PT, DPT  Doctor of Physical Therapy  [facility]  [phone]

## 2017-03-20 ENCOUNTER — MYC MEDICAL ADVICE (OUTPATIENT)
Dept: SLEEP MEDICINE | Facility: CLINIC | Age: 72
End: 2017-03-20

## 2017-03-20 ENCOUNTER — HOSPITAL ENCOUNTER (OUTPATIENT)
Dept: PHYSICAL THERAPY | Facility: CLINIC | Age: 72
Setting detail: THERAPIES SERIES
End: 2017-03-20
Attending: FAMILY MEDICINE
Payer: MEDICARE

## 2017-03-20 PROCEDURE — 97110 THERAPEUTIC EXERCISES: CPT | Mod: GP

## 2017-03-20 PROCEDURE — 40000718 ZZHC STATISTIC PT DEPARTMENT ORTHO VISIT

## 2017-03-21 NOTE — TELEPHONE ENCOUNTER
From: Cas Noriega  To: Bryce Recinos MD  Sent: 3/20/2017 5:33 PM CDT  Subject: apt    set apt for 10:00 am on an open day in my chart apts. thank you have a great week

## 2017-03-21 NOTE — TELEPHONE ENCOUNTER
March 21, 2017     Dear Mr. Noriega     Thank you for allowing me to participate in your care. I am writing to inform you we have reserved an appointment on:       Date:    4/4/2017 Tue   Time:    9:30 AM  With:   DR. NEELY      [place] [id number]   Scheduling phone number [phone]              We have reserved this time for you. If you need to cancel or reschedule please contact us.     If applicable, please bring your CPAP equipment with you. Please contact [facility]  at [phone] if you need to try a different style mask or if you need to troubleshoot your machine as they will need to arrange a separate appointment with you.    Thank you for choosing [facility]. If you have any further questions or concerns, please do not hesitate to contact us.    Best Regards,    Christina An[facility] Coordinator  [address] [address]  [email or web address]   Office: [phone]  Fax [phone]     Connect with [facility] on social media.

## 2017-03-23 ENCOUNTER — HOSPITAL ENCOUNTER (OUTPATIENT)
Dept: PHYSICAL THERAPY | Facility: CLINIC | Age: 72
Setting detail: THERAPIES SERIES
End: 2017-03-23
Attending: FAMILY MEDICINE
Payer: MEDICARE

## 2017-03-23 PROCEDURE — 40000718 ZZHC STATISTIC PT DEPARTMENT ORTHO VISIT

## 2017-03-23 PROCEDURE — 97110 THERAPEUTIC EXERCISES: CPT | Mod: GP

## 2017-03-26 ENCOUNTER — ANESTHESIA EVENT (OUTPATIENT)
Dept: SURGERY | Facility: CLINIC | Age: 72
End: 2017-03-26
Payer: MEDICARE

## 2017-03-27 ENCOUNTER — SURGERY (OUTPATIENT)
Age: 72
End: 2017-03-27

## 2017-03-27 ENCOUNTER — HOSPITAL ENCOUNTER (OUTPATIENT)
Facility: CLINIC | Age: 72
Discharge: HOME | End: 2017-03-27
Attending: OTOLARYNGOLOGY | Admitting: OTOLARYNGOLOGY
Payer: MEDICARE

## 2017-03-27 ENCOUNTER — ANESTHESIA (OUTPATIENT)
Dept: SURGERY | Facility: CLINIC | Age: 72
End: 2017-03-27
Payer: MEDICARE

## 2017-03-27 VITALS
RESPIRATION RATE: 16 BRPM | DIASTOLIC BLOOD PRESSURE: 75 MMHG | SYSTOLIC BLOOD PRESSURE: 131 MMHG | OXYGEN SATURATION: 100 % | WEIGHT: 235.67 LBS | HEIGHT: 68 IN | TEMPERATURE: 98.4 F | BODY MASS INDEX: 35.72 KG/M2

## 2017-03-27 DIAGNOSIS — H90.3 SENSORINEURAL HEARING LOSS, BILATERAL: Primary | ICD-10-CM

## 2017-03-27 PROCEDURE — 27210794 ZZH OR GENERAL SUPPLY STERILE: Performed by: OTOLARYNGOLOGY

## 2017-03-27 PROCEDURE — 25000128 H RX IP 250 OP 636: Performed by: STUDENT IN AN ORGANIZED HEALTH CARE EDUCATION/TRAINING PROGRAM

## 2017-03-27 PROCEDURE — 25000132 ZZH RX MED GY IP 250 OP 250 PS 637: Mod: GY | Performed by: STUDENT IN AN ORGANIZED HEALTH CARE EDUCATION/TRAINING PROGRAM

## 2017-03-27 PROCEDURE — 25800025 ZZH RX 258: Performed by: ANESTHESIOLOGY

## 2017-03-27 PROCEDURE — 25000128 H RX IP 250 OP 636: Performed by: OTOLARYNGOLOGY

## 2017-03-27 PROCEDURE — 71000027 ZZH RECOVERY PHASE 2 EACH 15 MINS: Performed by: OTOLARYNGOLOGY

## 2017-03-27 PROCEDURE — 36000062 ZZH SURGERY LEVEL 4 1ST 30 MIN - UMMC: Performed by: OTOLARYNGOLOGY

## 2017-03-27 PROCEDURE — A9270 NON-COVERED ITEM OR SERVICE: HCPCS | Mod: GY | Performed by: STUDENT IN AN ORGANIZED HEALTH CARE EDUCATION/TRAINING PROGRAM

## 2017-03-27 PROCEDURE — 25000132 ZZH RX MED GY IP 250 OP 250 PS 637: Mod: GY | Performed by: OTOLARYNGOLOGY

## 2017-03-27 PROCEDURE — 37000009 ZZH ANESTHESIA TECHNICAL FEE, EACH ADDTL 15 MIN: Performed by: OTOLARYNGOLOGY

## 2017-03-27 PROCEDURE — L8614 COCHLEAR DEVICE: HCPCS | Performed by: OTOLARYNGOLOGY

## 2017-03-27 PROCEDURE — 25000125 ZZHC RX 250: Performed by: STUDENT IN AN ORGANIZED HEALTH CARE EDUCATION/TRAINING PROGRAM

## 2017-03-27 PROCEDURE — 25000125 ZZHC RX 250: Performed by: ANESTHESIOLOGY

## 2017-03-27 PROCEDURE — 37000008 ZZH ANESTHESIA TECHNICAL FEE, 1ST 30 MIN: Performed by: OTOLARYNGOLOGY

## 2017-03-27 PROCEDURE — 40000170 ZZH STATISTIC PRE-PROCEDURE ASSESSMENT II: Performed by: OTOLARYNGOLOGY

## 2017-03-27 PROCEDURE — 71000015 ZZH RECOVERY PHASE 1 LEVEL 2 EA ADDTL HR: Performed by: OTOLARYNGOLOGY

## 2017-03-27 PROCEDURE — 71000014 ZZH RECOVERY PHASE 1 LEVEL 2 FIRST HR: Performed by: OTOLARYNGOLOGY

## 2017-03-27 PROCEDURE — 25000566 ZZH SEVOFLURANE, EA 15 MIN: Performed by: OTOLARYNGOLOGY

## 2017-03-27 PROCEDURE — 36000064 ZZH SURGERY LEVEL 4 EA 15 ADDTL MIN - UMMC: Performed by: OTOLARYNGOLOGY

## 2017-03-27 DEVICE — IMPLANTABLE DEVICE: Type: IMPLANTABLE DEVICE | Site: EAR | Status: FUNCTIONAL

## 2017-03-27 RX ORDER — DEXAMETHASONE SODIUM PHOSPHATE 4 MG/ML
INJECTION, SOLUTION INTRA-ARTICULAR; INTRALESIONAL; INTRAMUSCULAR; INTRAVENOUS; SOFT TISSUE PRN
Status: DISCONTINUED | OUTPATIENT
Start: 2017-03-27 | End: 2017-03-27

## 2017-03-27 RX ORDER — SODIUM CHLORIDE, SODIUM LACTATE, POTASSIUM CHLORIDE, CALCIUM CHLORIDE 600; 310; 30; 20 MG/100ML; MG/100ML; MG/100ML; MG/100ML
INJECTION, SOLUTION INTRAVENOUS CONTINUOUS
Status: DISCONTINUED | OUTPATIENT
Start: 2017-03-27 | End: 2017-03-27 | Stop reason: HOSPADM

## 2017-03-27 RX ORDER — LABETALOL HYDROCHLORIDE 5 MG/ML
10 INJECTION, SOLUTION INTRAVENOUS
Status: DISCONTINUED | OUTPATIENT
Start: 2017-03-27 | End: 2017-03-27 | Stop reason: HOSPADM

## 2017-03-27 RX ORDER — GLYCOPYRROLATE 0.2 MG/ML
INJECTION, SOLUTION INTRAMUSCULAR; INTRAVENOUS PRN
Status: DISCONTINUED | OUTPATIENT
Start: 2017-03-27 | End: 2017-03-27

## 2017-03-27 RX ORDER — ALBUTEROL SULFATE 0.83 MG/ML
2.5 SOLUTION RESPIRATORY (INHALATION) ONCE
Status: COMPLETED | OUTPATIENT
Start: 2017-03-27 | End: 2017-03-27

## 2017-03-27 RX ORDER — AMOXICILLIN 250 MG
1-2 CAPSULE ORAL 2 TIMES DAILY
Qty: 30 TABLET | Refills: 0 | Status: SHIPPED | OUTPATIENT
Start: 2017-03-27 | End: 2017-04-26

## 2017-03-27 RX ORDER — FENTANYL CITRATE 50 UG/ML
25-50 INJECTION, SOLUTION INTRAMUSCULAR; INTRAVENOUS
Status: DISCONTINUED | OUTPATIENT
Start: 2017-03-27 | End: 2017-03-27 | Stop reason: HOSPADM

## 2017-03-27 RX ORDER — EPINEPHRINE NASAL SOLUTION 1 MG/ML
SOLUTION NASAL PRN
Status: DISCONTINUED | OUTPATIENT
Start: 2017-03-27 | End: 2017-03-27 | Stop reason: HOSPADM

## 2017-03-27 RX ORDER — LIDOCAINE HYDROCHLORIDE 20 MG/ML
INJECTION, SOLUTION INFILTRATION; PERINEURAL PRN
Status: DISCONTINUED | OUTPATIENT
Start: 2017-03-27 | End: 2017-03-27

## 2017-03-27 RX ORDER — OXYCODONE HYDROCHLORIDE 5 MG/1
5-10 TABLET ORAL EVERY 4 HOURS PRN
Qty: 30 TABLET | Refills: 0 | Status: SHIPPED | OUTPATIENT
Start: 2017-03-27 | End: 2017-04-26

## 2017-03-27 RX ORDER — OXYCODONE HYDROCHLORIDE 5 MG/1
5-10 TABLET ORAL
Status: COMPLETED | OUTPATIENT
Start: 2017-03-27 | End: 2017-03-27

## 2017-03-27 RX ORDER — PROPOFOL 10 MG/ML
INJECTION, EMULSION INTRAVENOUS PRN
Status: DISCONTINUED | OUTPATIENT
Start: 2017-03-27 | End: 2017-03-27

## 2017-03-27 RX ORDER — NALOXONE HYDROCHLORIDE 0.4 MG/ML
.1-.4 INJECTION, SOLUTION INTRAMUSCULAR; INTRAVENOUS; SUBCUTANEOUS
Status: DISCONTINUED | OUTPATIENT
Start: 2017-03-27 | End: 2017-03-27 | Stop reason: HOSPADM

## 2017-03-27 RX ORDER — ONDANSETRON 4 MG/1
4 TABLET, ORALLY DISINTEGRATING ORAL EVERY 30 MIN PRN
Status: DISCONTINUED | OUTPATIENT
Start: 2017-03-27 | End: 2017-03-27 | Stop reason: HOSPADM

## 2017-03-27 RX ORDER — HYDROMORPHONE HYDROCHLORIDE 1 MG/ML
.3-.5 INJECTION, SOLUTION INTRAMUSCULAR; INTRAVENOUS; SUBCUTANEOUS EVERY 5 MIN PRN
Status: DISCONTINUED | OUTPATIENT
Start: 2017-03-27 | End: 2017-03-27 | Stop reason: HOSPADM

## 2017-03-27 RX ORDER — LIDOCAINE 40 MG/G
CREAM TOPICAL
Status: DISCONTINUED | OUTPATIENT
Start: 2017-03-27 | End: 2017-03-27 | Stop reason: HOSPADM

## 2017-03-27 RX ORDER — ONDANSETRON 4 MG/1
4 TABLET, ORALLY DISINTEGRATING ORAL
Status: DISCONTINUED | OUTPATIENT
Start: 2017-03-27 | End: 2017-03-27 | Stop reason: HOSPADM

## 2017-03-27 RX ORDER — ONDANSETRON 2 MG/ML
INJECTION INTRAMUSCULAR; INTRAVENOUS PRN
Status: DISCONTINUED | OUTPATIENT
Start: 2017-03-27 | End: 2017-03-27

## 2017-03-27 RX ORDER — ONDANSETRON 2 MG/ML
4 INJECTION INTRAMUSCULAR; INTRAVENOUS EVERY 30 MIN PRN
Status: DISCONTINUED | OUTPATIENT
Start: 2017-03-27 | End: 2017-03-27 | Stop reason: HOSPADM

## 2017-03-27 RX ORDER — FENTANYL CITRATE 50 UG/ML
INJECTION, SOLUTION INTRAMUSCULAR; INTRAVENOUS PRN
Status: DISCONTINUED | OUTPATIENT
Start: 2017-03-27 | End: 2017-03-27

## 2017-03-27 RX ORDER — CEFAZOLIN SODIUM 1 G/3ML
1 INJECTION, POWDER, FOR SOLUTION INTRAMUSCULAR; INTRAVENOUS SEE ADMIN INSTRUCTIONS
Status: DISCONTINUED | OUTPATIENT
Start: 2017-03-27 | End: 2017-03-27 | Stop reason: HOSPADM

## 2017-03-27 RX ORDER — EPHEDRINE SULFATE 50 MG/ML
INJECTION, SOLUTION INTRAMUSCULAR; INTRAVENOUS; SUBCUTANEOUS PRN
Status: DISCONTINUED | OUTPATIENT
Start: 2017-03-27 | End: 2017-03-27

## 2017-03-27 RX ORDER — CEFAZOLIN SODIUM 2 G/100ML
2 INJECTION, SOLUTION INTRAVENOUS
Status: COMPLETED | OUTPATIENT
Start: 2017-03-27 | End: 2017-03-27

## 2017-03-27 RX ADMIN — HYDROMORPHONE HYDROCHLORIDE 0.3 MG: 1 INJECTION, SOLUTION INTRAMUSCULAR; INTRAVENOUS; SUBCUTANEOUS at 13:13

## 2017-03-27 RX ADMIN — HYDROMORPHONE HYDROCHLORIDE 0.5 MG: 10 INJECTION, SOLUTION INTRAMUSCULAR; INTRAVENOUS; SUBCUTANEOUS at 15:04

## 2017-03-27 RX ADMIN — Medication 10 MG: at 10:35

## 2017-03-27 RX ADMIN — GLYCOPYRROLATE 0.1 MG: 0.2 INJECTION, SOLUTION INTRAMUSCULAR; INTRAVENOUS at 10:36

## 2017-03-27 RX ADMIN — OXYCODONE HYDROCHLORIDE 5 MG: 5 TABLET ORAL at 15:22

## 2017-03-27 RX ADMIN — PHENYLEPHRINE HYDROCHLORIDE 100 MCG: 10 INJECTION, SOLUTION INTRAMUSCULAR; INTRAVENOUS; SUBCUTANEOUS at 11:13

## 2017-03-27 RX ADMIN — Medication 5 MG: at 10:50

## 2017-03-27 RX ADMIN — PROPOFOL 100 MG: 10 INJECTION, EMULSION INTRAVENOUS at 10:28

## 2017-03-27 RX ADMIN — SODIUM CHLORIDE, POTASSIUM CHLORIDE, SODIUM LACTATE AND CALCIUM CHLORIDE: 600; 310; 30; 20 INJECTION, SOLUTION INTRAVENOUS at 10:18

## 2017-03-27 RX ADMIN — Medication 30 ML: at 11:10

## 2017-03-27 RX ADMIN — HYDROMORPHONE HYDROCHLORIDE 0.5 MG: 10 INJECTION, SOLUTION INTRAMUSCULAR; INTRAVENOUS; SUBCUTANEOUS at 14:44

## 2017-03-27 RX ADMIN — REMIFENTANIL HYDROCHLORIDE 0.2 MCG/KG/MIN: 1 INJECTION, POWDER, LYOPHILIZED, FOR SOLUTION INTRAVENOUS at 10:52

## 2017-03-27 RX ADMIN — FENTANYL CITRATE 100 MCG: 50 INJECTION, SOLUTION INTRAMUSCULAR; INTRAVENOUS at 10:28

## 2017-03-27 RX ADMIN — ONDANSETRON 4 MG: 2 INJECTION INTRAMUSCULAR; INTRAVENOUS at 12:56

## 2017-03-27 RX ADMIN — PHENYLEPHRINE HYDROCHLORIDE 100 MCG: 10 INJECTION, SOLUTION INTRAMUSCULAR; INTRAVENOUS; SUBCUTANEOUS at 10:50

## 2017-03-27 RX ADMIN — PHENYLEPHRINE HYDROCHLORIDE 100 MCG: 10 INJECTION, SOLUTION INTRAMUSCULAR; INTRAVENOUS; SUBCUTANEOUS at 11:06

## 2017-03-27 RX ADMIN — ALBUTEROL SULFATE 2.5 MG: 2.5 SOLUTION RESPIRATORY (INHALATION) at 09:59

## 2017-03-27 RX ADMIN — FENTANYL CITRATE 50 MCG: 50 INJECTION, SOLUTION INTRAMUSCULAR; INTRAVENOUS at 14:16

## 2017-03-27 RX ADMIN — CEFAZOLIN SODIUM 2 G: 2 INJECTION, SOLUTION INTRAVENOUS at 10:57

## 2017-03-27 RX ADMIN — FENTANYL CITRATE 50 MCG: 50 INJECTION, SOLUTION INTRAMUSCULAR; INTRAVENOUS at 14:38

## 2017-03-27 RX ADMIN — DEXAMETHASONE SODIUM PHOSPHATE 10 MG: 4 INJECTION, SOLUTION INTRAMUSCULAR; INTRAVENOUS at 11:06

## 2017-03-27 RX ADMIN — LIDOCAINE HYDROCHLORIDE 80 MG: 20 INJECTION, SOLUTION INFILTRATION; PERINEURAL at 10:28

## 2017-03-27 RX ADMIN — SUCCINYLCHOLINE CHLORIDE 100 MG: 20 INJECTION, SOLUTION INTRAMUSCULAR; INTRAVENOUS at 10:29

## 2017-03-27 RX ADMIN — PROPOFOL 20 MG: 10 INJECTION, EMULSION INTRAVENOUS at 13:30

## 2017-03-27 RX ADMIN — PROPOFOL 40 MG: 10 INJECTION, EMULSION INTRAVENOUS at 10:30

## 2017-03-27 RX ADMIN — EPINEPHRINE 5 ML: 1 INJECTION INTRAMUSCULAR; INTRAVENOUS; SUBCUTANEOUS at 11:00

## 2017-03-27 RX ADMIN — CEFAZOLIN 1 G: 1 INJECTION, POWDER, FOR SOLUTION INTRAMUSCULAR; INTRAVENOUS at 13:07

## 2017-03-27 RX ADMIN — FENTANYL CITRATE 50 MCG: 50 INJECTION, SOLUTION INTRAMUSCULAR; INTRAVENOUS at 10:50

## 2017-03-27 RX ADMIN — PHENYLEPHRINE HYDROCHLORIDE 100 MCG: 10 INJECTION, SOLUTION INTRAMUSCULAR; INTRAVENOUS; SUBCUTANEOUS at 11:09

## 2017-03-27 NOTE — IP AVS SNAPSHOT
MRN:[id number]                      After Visit Summary   3/27/2017    Cas Noriega    MRN: [id number]           Thank you!     Thank you for choosing [facility] for your care. Our goal is always to provide you with excellent care. Hearing back from our patients is one way we can continue to improve our services. Please take a few minutes to complete the written survey that you may receive in the mail after you visit with us. Thank you!        Patient Information     Date Of Birth          1945        About your hospital stay     You were admitted on:  March 27, 2017 You last received care in the:  Same Day Surgery [facility]    You were discharged on:  March 27, 2017       Who to Call     For medical emergencies, please call 911.  For non-urgent questions about your medical care, please call your primary care provider or clinic, [phone]  For questions related to your surgery, please call your surgery clinic        Attending Provider     Provider Specialty    Mickey Turpin MD Otolaryngology       Primary Care Provider Office Phone # Fax #    Xu Wells PA-C [phone]       [address] [address]        After Care Instructions     Diet Instructions       Resume pre procedure diet            Discharge Instructions       1. Medications:  Resume your home medications. Take pain medications when needed as indicated. Use docusate to avoid constipation.    2. Wound care:    * You can remove your head dressing in 48 hours.    3. No shower until 48 hours after surgery. It is best to shower with a shower cap in place and then use a cup to wash hair over the sink as to not let your incision be soaked with water for the first few 5 days. Keep incision clean and dry, do not scrub. Use a cotton ball coated with Vaseline to keep water out of ear canal.    4. For the next 2 weeks, no heavy lifting more than 5 pounds, no strenuous  activities. No nose blowing. Sneeze with your mouth open.    5. Please call MD or come to the ED for shortness of breath, trouble breathing, inability to tolerate liquids, intractable dizziness, or signs of infection such as fevers or redness.    Call the [phone] clinic number if you have any questions and concerns.    6. Follow up with Dr. Turpin as previously scheduled in 3 weeks the same day as audiology cochlear implant activation                  Your next 10 appointments already scheduled     Mar 28, 2017 10:00 AM CDT   Treatment with Carlton Graves PT   [facility] ([facility])    [address] [place] [address]   [phone]            Mar 30, 2017 10:00 AM CDT   Treatment with Carlton Graves PT   [facility] ([facility])    [address] [place] [address]   [phone]            Apr 03, 2017 10:00 AM CDT   Treatment with Carlton Graves PT   [facility] ([facility])    [address] [place] [address]   [phone]            Apr 04, 2017  9:30 AM CDT   Return Sleep Patient with Bryce Recinos MD   [facility] ([facility]note [facility]    [address]  [facility] [address]   [phone]            Apr 06, 2017 10:00 AM CDT   Treatment with Carlton Graves PT   [facility] ([facility])    [address] [place] [address]   [phone]            Apr 19, 2017  8:45 AM CDT   LAB with [place] [facility]   [facility] ([facility]    [address]  [facility] [address]   [phone]           Patient must bring picture ID.  Patient should be prepared to give a urine specimen  Please do not eat 10-12 hours before your appointment if you are coming in fasting for labs on lipids, cholesterol, or glucose (sugar).  Pregnant women should follow  their Care Team instructions. Water with medications is okay. Do not drink coffee or other fluids.   If you have concerns about taking  your medications, please ask at office or if scheduling via [employer], send a message by clicking on Secure Messaging, Message Your Care Team.            Apr 19, 2017  9:00 AM CDT   NM MPI WITH LEXISCAN with WYNM2, [place] STRESS TEST   [facility] ([facility])    [address] [facility] [address]   [phone]           For a ONE day exam: Allow 3-4 hours for test. For a TWO day exam: Allow 2 hours PER day for test.  You may need to stop some medicines before the test. Follow your doctor s orders. - If you take a beta blocker: Follow your doctor s specific instructions on taking it prior to and on the day of your exam. - If you take Aggrenox or dipyridamole (Persantine, Permole), stop taking it 48 hours before your test. - If you take Viagra, Cialis or Levitra, stop taking it 48 hours before your test. - If you take theophylline or aminophylline, stop taking it 12 hours before your test.  For patients with diabetes: - If you take insulin, call your diabetes care team. Ask if you should take a 1/2 dose the morning of your test. - If you take diabetes medicine by mouth, don t take it on the morning of your test. Bring it with you to take after the test. (If you have questions, call your diabetes care team.)  Do not take nitrates on the day of your test. Do not wear your Nitro-Patch.  Stop all caffeine 12 hours before the test. This includes coffee, tea, soda pop, chocolate and certain medicines (such as Anacin, Excedrin and NoDoz). Also avoid decaf coffee and tea, as these contain small amounts of caffeine.  No alcohol, smoking or other tobacco for 12 hours before the test.  Stop eating 3 hours before the test. You may drink water.  Please wear a loose two-piece outfit. If you will have an exercise test, bring rubber-soled walking shoes.   When you arrive, please tell us if you: - Have diabetes - Are breastfeeding - May be pregnant - Have a pacemaker of ICD (implantable defibrillator).  Please call your Imaging Department at your exam site with any questions.            2017 11:00 AM CDT   (Arrive by 10:45 AM)   Return Visit with iMckey Turpin MD   [facility] ([facility])    [address] [place] [id number] [phone]            2017  1:00 PM CDT   Cochlear Implant Return with Vladislav Noble   [facility] ([facility])    [address] [place] [address] [phone]              Further instructions from your care team       [facility] [place]  Same-Day Surgery   Adult Discharge Orders & Instructions     For 24 hours after surgery    1. Get plenty of rest.  A responsible adult must stay with you for at least 24 hours after you leave the hospital.   2. Do not drive or use heavy equipment.  If you have weakness or tingling, don't drive or use heavy equipment until this feeling goes away.  3. Do not drink alcohol.  4. Avoid strenuous or risky activities.  Ask for help when climbing stairs.   5. You may feel lightheaded.  IF so, sit for a few minutes before standing.  Have someone help you get up.   6. If you have nausea (feel sick to your stomach): Drink only clear liquids such as apple juice, ginger ale, broth or 7-Up.  Rest may also help.  Be sure to drink enough fluids.  Move to a regular diet as you feel able.  7. You may have a slight fever. Call the doctor if your fever is over 100 F (37.7 C) (taken under the tongue) or lasts longer than 24 hours.  8. You may have a dry mouth, a sore throat, muscle aches or trouble sleeping.  These should go away after 24 hours.  9. Do not make important or legal decisions.   Call your doctor for any of the followin.  Signs  "of infection (fever, growing tenderness at the surgery site, a large amount of drainage or bleeding, severe pain, foul-smelling drainage, redness, swelling).    2. It has been over 8 to 10 hours since surgery and you are still not able to urinate (pass water).    3.  Headache for over 24 hours.    To contact a doctor, call Dr Turpin's office at [phone] or:        [phone] and ask for the resident on call for ENT (answered 24 hours a day)      Emergency Department:    [facility]: [phone]       (TTY for hearing impaired: [phone])            Pending Results     No orders found from 3/25/2017 to 3/28/2017.            Admission Information     Date & Time Provider Department Dept. Phone    3/27/2017 Mickey Turpin MD Same Day Surgery [facility] [phone]      Your Vitals Were     Blood Pressure Temperature Respirations Height Weight Pulse Oximetry    140/85 97.7  F (36.5  C) (Oral) 18 1.727 m (5' 8\") 106.9 kg (235 lb 10.8 oz) 95%    BMI (Body Mass Index)                   35.83 kg/m2           [employer] Information     [employer] gives you secure access to your electronic health record. If you see a primary care provider, you can also send messages to your care team and make appointments. If you have questions, please call your primary care clinic.  If you do not have a primary care provider, please call [phone] and they will assist you.        Care EveryWhere ID     This is your Care EveryWhere ID. This could be used by other organizations to access your [facility] medical records  [id number]           Review of your medicines      START taking        Dose / Directions    oxyCODONE 5 MG IR tablet   Commonly known as:  ROXICODONE   Used for:  Sensorineural hearing loss, bilateral        Dose:  5-10 mg   Take 1-2 tablets (5-10 mg) by mouth every 4 hours as needed for pain or other (Moderate to Severe)   Quantity:  30 tablet   Refills:  0       senna-docusate 8.6-50 MG per tablet "   Commonly known as:  SENOKOT-S;PERICOLACE   Used for:  Sensorineural hearing loss, bilateral        Dose:  1-2 tablet   Take 1-2 tablets by mouth 2 times daily Take while on oral narcotics to prevent or treat constipation.   Quantity:  30 tablet   Refills:  0         CONTINUE these medicines which have NOT CHANGED        Dose / Directions    ACETAMINOPHEN PO        Dose:  500 mg   Take 500 mg by mouth 2 every 6 hours, morning, noon and night. As Needed for shoulder.   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, episode of care unspecified        one daily   Refills:  0       atenolol 50 MG tablet   Commonly known as:  TENORMIN   Used for:  Essential hypertension with goal blood pressure less than 140/90        Dose:  50 mg   Take 1 tablet (50 mg) by mouth daily   Quantity:  90 tablet   Refills:  0       atorvastatin 80 MG tablet   Commonly known as:  LIPITOR   Used for:  Hyperlipidemia with target LDL less than 70        Dose:  80 mg   Take 1 tablet (80 mg) by mouth daily   Quantity:  90 tablet   Refills:  0       carbidopa-levodopa  MG per tablet   Commonly known as:  SINEMET   Used for:  Restless legs syndrome (RLS)        Dose:  2 tablet   Take 2 tablets by mouth daily At bedtime.  Written as 90 day supply.   Quantity:  180 tablet   Refills:  0       cefadroxil 500 MG capsule   Commonly known as:  DURICEF   Used for:  Prosthetic joint infection, sequela        Dose:  500 mg   Take 1 capsule (500 mg) by mouth 2 times daily   Quantity:  60 capsule   Refills:  3       DIPHENHYDRAMINE HCL PO        Take by  mouth daily   Refills:  0       fluorouracil 5 % cream   Commonly known as:  EFUDEX   Used for:  Actinic keratosis        Apply topically 2 times daily   Quantity:  40 g   Refills:  3       fluticasone 50 MCG/ACT spray   Commonly known as:  FLONASE   Used for:  Chronic nasal congestion        Dose:  1-2 spray   Spray 1-2 sprays into both nostrils daily   Quantity:  48 g   Refills:  3       GLUCOSAMINE CHONDR 1500 COMPLX PO        glucosamine(1500mg)plus chondroitin(1200mg) takes 1 tablet daily   Refills:  0       hydrochlorothiazide 25 MG tablet   Commonly known as:  HYDRODIURIL   Used for:  Essential hypertension with goal blood pressure less than 140/90        Dose:  25 mg   Take 1 tablet (25 mg) by mouth every morning   Quantity:  90 tablet   Refills:  0       lactobacillus rhamnosus (GG) capsule   Used for:  Encounter for long-term (current) use of antibiotics        Dose:  1 capsule   Take 1 capsule by mouth daily   Quantity:  90 capsule   Refills:  11       lisinopril 40 MG tablet   Commonly known as:  PRINIVIL/ZESTRIL   Used for:  Essential hypertension with goal blood pressure less than 140/90        Dose:  40 mg   Take 1 tablet (40 mg) by mouth daily   Quantity:  90 tablet   Refills:  0       nitroglycerin 0.4 MG sublingual tablet   Commonly known as:  NITROSTAT   Used for:  Coronary artery disease involving native heart with angina pectoris, unspecified vessel or lesion type (H)        Dose:  0.4 mg   Place 1 tablet (0.4 mg) under the tongue every 5 minutes as needed for chest pain   Quantity:  30 tablet   Refills:  12       OMEGA-3 FISH OIL PO        Take by mouth 2 times daily (with meals) 1200mg/360mg   Refills:  0       order for DME        Equipment being ordered: CPAP Cas Noriega received a [employer] AirSense 10 CPAP. Pressures were set at Auto 11 - 15 cm H2O.   Refills:  0       order for DME   Used for:  Unspecified essential hypertension        one digital automatic Home blood pressure machine  ( per insurance)   Quantity:  1 Device   Refills:  0       traZODone 100 MG tablet   Commonly known as:  DESYREL   Used for:  Insomnia, unspecified type        Dose:  100 mg   Take 1 tablet (100 mg) by mouth At Bedtime   Quantity:  90 tablet   Refills:  0            Where to get your medicines      Some of these will need a paper prescription and others can be bought over the counter. Ask your nurse if you have questions.     Bring a paper prescription for each of these medications     oxyCODONE 5 MG IR tablet    senna-docusate 8.6-50 MG per tablet                Protect others around you: Learn how to safely use, store and throw away your medicines at www.disposemymeds.org.             Medication List: This is a list of all your medications and when to take them. Check marks below indicate your daily home schedule. Keep this list as a reference.      Medications           Morning Afternoon Evening Bedtime As Needed    ACETAMINOPHEN PO   Take 500 mg by mouth 2 every 6 hours, morning, noon and night. As Needed for shoulder.                                albuterol 108 (90 BASE) MCG/ACT Inhaler   Commonly known as:  PROAIR HFA/PROVENTIL HFA/VENTOLIN HFA   Inhale 2 puffs into the lungs every 6 hours as needed for shortness of breath / dyspnea or wheezing                                aspirin 325 MG EC tablet   one daily                                atenolol 50 MG tablet   Commonly known as:  TENORMIN   Take 1 tablet (50 mg) by mouth daily                                atorvastatin 80 MG tablet   Commonly known as:  LIPITOR   Take 1 tablet (80 mg) by mouth daily                                carbidopa-levodopa  MG per tablet   Commonly known as:  SINEMET   Take 2 tablets by mouth daily At bedtime.  Written as 90 day supply.                                cefadroxil 500 MG capsule   Commonly known as:  DURICEF   Take 1 capsule (500 mg) by mouth 2 times daily                                DIPHENHYDRAMINE HCL  PO   Take by mouth daily                                fluorouracil 5 % cream   Commonly known as:  EFUDEX   Apply topically 2 times daily                                fluticasone 50 MCG/ACT spray   Commonly known as:  FLONASE   Spray 1-2 sprays into both nostrils daily                                GLUCOSAMINE CHONDR 1500 COMPLX PO   glucosamine(1500mg)plus chondroitin(1200mg) takes 1 tablet daily                                hydrochlorothiazide 25 MG tablet   Commonly known as:  HYDRODIURIL   Take 1 tablet (25 mg) by mouth every morning                                lactobacillus rhamnosus (GG) capsule   Take 1 capsule by mouth daily                                lisinopril 40 MG tablet   Commonly known as:  PRINIVIL/ZESTRIL   Take 1 tablet (40 mg) by mouth daily                                nitroglycerin 0.4 MG sublingual tablet   Commonly known as:  NITROSTAT   Place 1 tablet (0.4 mg) under the tongue every 5 minutes as needed for chest pain                                OMEGA-3 FISH OIL PO   Take by mouth 2 times daily (with meals) 1200mg/360mg                                order for DME   Equipment being ordered: CPAP Cas Noriega received a [employer] AirSense 10 CPAP. Pressures were set at Auto 11 - 15 cm H2O.                                order for DME   one digital automatic Home blood pressure machine ( per insurance)                                oxyCODONE 5 MG IR tablet   Commonly known as:  ROXICODONE   Take 1-2 tablets (5-10 mg) by mouth every 4 hours as needed for pain or other (Moderate to Severe)   Last time this was given:  5 mg on 3/27/2017  3:22 PM                                senna-docusate 8.6-50 MG per tablet   Commonly known as:  SENOKOT-S;PERICOLACE   Take 1-2 tablets by mouth 2 times daily Take while on oral narcotics to prevent or treat constipation.                                traZODone 100 MG tablet   Commonly known as:  DESYREL   Take 1 tablet (100 mg) by mouth At  Bedtime

## 2017-03-27 NOTE — ANESTHESIA POSTPROCEDURE EVALUATION
Patient: Cas Noriega    Procedure(s):  Left Cochlear Implant Advanced Bionics - Wound Class: I-Clean    Diagnosis:Bilateral Sensorineural Hearing Loss  Diagnosis Additional Information: No value filed.    Anesthesia Type:  General, ETT    Note:  Anesthesia Post Evaluation    Patient location during evaluation: PACU  Patient participation: Able to fully participate in evaluation  Level of consciousness: awake and alert  Pain management: adequate  Airway patency: patent  Cardiovascular status: acceptable  Respiratory status: acceptable  Hydration status: acceptable  PONV: none             Last vitals:  Vitals:    03/27/17 1400 03/27/17 1415 03/27/17 1430   BP:  144/81 137/86   Resp: 20 20 16   Temp:  36.7  C (98.1  F)    SpO2:   92%         Electronically Signed By: Waylon Lopez MD  March 27, 2017  3:21 PM

## 2017-03-27 NOTE — DISCHARGE INSTRUCTIONS
[facility]  Same-Day Surgery   Adult Discharge Orders & Instructions     For 24 hours after surgery    1. Get plenty of rest.  A responsible adult must stay with you for at least 24 hours after you leave the hospital.   2. Do not drive or use heavy equipment.  If you have weakness or tingling, don't drive or use heavy equipment until this feeling goes away.  3. Do not drink alcohol.  4. Avoid strenuous or risky activities.  Ask for help when climbing stairs.   5. You may feel lightheaded.  IF so, sit for a few minutes before standing.  Have someone help you get up.   6. If you have nausea (feel sick to your stomach): Drink only clear liquids such as apple juice, ginger ale, broth or 7-Up.  Rest may also help.  Be sure to drink enough fluids.  Move to a regular diet as you feel able.  7. You may have a slight fever. Call the doctor if your fever is over 100 F (37.7 C) (taken under the tongue) or lasts longer than 24 hours.  8. You may have a dry mouth, a sore throat, muscle aches or trouble sleeping.  These should go away after 24 hours.  9. Do not make important or legal decisions.   Call your doctor for any of the followin.  Signs of infection (fever, growing tenderness at the surgery site, a large amount of drainage or bleeding, severe pain, foul-smelling drainage, redness, swelling).    2. It has been over 8 to 10 hours since surgery and you are still not able to urinate (pass water).    3.  Headache for over 24 hours.    To contact a doctor, call Dr Turpin's office at [phone] or:        [phone] and ask for the resident on call for ENT (answered 24 hours a day)      Emergency Department:    [facility]: [phone]       (TTY for hearing impaired: [phone])

## 2017-03-27 NOTE — IP AVS SNAPSHOT
Same Day Surgery [address] [address]    Phone:  [phone]                                       After Visit Summary   3/27/2017    Cas Noriega    MRN: [id number]           After Visit Summary Signature Page     I have received my discharge instructions, and my questions have been answered. I have discussed any challenges I see with this plan with the nurse or doctor.    ..........................................................................................................................................  Patient/Patient Representative Signature      ..........................................................................................................................................  Patient Representative Print Name and Relationship to Patient    ..................................................               ................................................  Date                                            Time    ..........................................................................................................................................  Reviewed by Signature/Title    ...................................................              ..............................................  Date                                                            Time

## 2017-03-27 NOTE — ANESTHESIA PREPROCEDURE EVALUATION
Anesthesia Evaluation     .             ROS/MED HX    ENT/Pulmonary:     (+)sleep apnea, other ENT- bilateral SNHL s/p R cochlear implant, Intermittent asthma uses CPAP , . .    Neurologic:     (+)other neuro RLS    Cardiovascular:     (+) Dyslipidemia, hypertension--CAD, -CABG-stent,. Taking blood thinners : . . . :. .       METS/Exercise Tolerance:     Hematologic:  - neg hematologic  ROS       Musculoskeletal:   (+) , , other musculoskeletal- shoulder pain, back pain. s/p shoulder replacement      GI/Hepatic:  - neg GI/hepatic ROS       Renal/Genitourinary:  - ROS Renal section negative       Endo:     (+) Obesity, .      Psychiatric:     (+) psychiatric history depression      Infectious Disease:  - neg infectious disease ROS       Malignancy:      - no malignancy   Other:    - neg other ROS               ANESTHESIA PREOP EVALUATION    Procedure: Procedure(s):  Left Cochlear Implant [employer] - Wound Class:     HPI: Cas Noriega is a 71 year old male who is presenting for above stated procedure.He has a history of a right cochlear implant for SNHL and is scheduled to get a left sided one. Procedure for the right appears uneventful based on the anesthetic record. He has a significant history of CAD s/p stent and CABG but has been stable functionally and per cardiac studies performed in 2015.  He is only on aspirin for chemoprophylaxis and otherwise has not had any interval cardiac issues. He does have significant shoulder and back pain for which he gets physical therapy, MARTY on CPAP, and is on sinimet for what appears to be a movement disorder but not formally PD.     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  RLS      Restless legs syndrome (RLS)      Rotator cuff disorder 6/23/2010    H/o rotator cuff repair on right-6/05 L shoulder surgery pending-Dr. Hartley until insurance changes      S/P shoulder joint replacement 2/26/2013     Senile nuclear sclerosis 6/3/2012     Sensorineural hearing loss, bilateral 7/18/2012       Past Surgical History:   Procedure Laterality Date     ARTHROPLASTY SHOULDER  2/26/2013    Procedure: ARTHROPLASTY SHOULDER;  Left Total Shoulder Arthropasty;  Surgeon: Xu Snell MD;  Location: [facility]     CHOLECYSTECTOMY, LAPOROSCOPIC  3/2007    Cholecystectomy, Laparoscopic     COLONOSCOPY  8/2009    Benign polyp     COLONOSCOPY N/A 3/25/2015    Procedure: COLONOSCOPY;  Surgeon: Deejay Marie MD;  Location: [facility]     IMPLANT COCHLEA WITH NERVE INTEGRITY MONITOR Right 12/17/2015    Procedure: IMPLANT COCHLEA WITH NERVE INTEGRITY MONITOR;  Surgeon: Mickey Turpin MD;  Location:  OR     PHACOEMULSIFICATION WITH STANDARD INTRAOCULAR LENS IMPLANT  6/4/2012    Procedure:PHACOEMULSIFICATION WITH STANDARD INTRAOCULAR LENS IMPLANT; Left kelman phacoemulsification with intraocular lens implant; Surgeon:TEZ HILL; Location:[facility]     PHACOEMULSIFICATION WITH STANDARD INTRAOCULAR LENS IMPLANT  7/26/2012    Procedure: PHACOEMULSIFICATION WITH STANDARD INTRAOCULAR LENS IMPLANT;  Right Kelman phacoemulsification with intraocular lens  implant;  Surgeon: Dayday Daley MD;  Location: [facility]     RELEASE CARPAL TUNNEL  10/23/2012    Procedure: RELEASE CARPAL TUNNEL;  Right carpal tunnel release;  Surgeon: Xu Snell MD;  Location: [facility]     RELEASE CARPAL TUNNEL  11/9/2012    Procedure: RELEASE CARPAL TUNNEL;  Left carpal tunnel release;  Surgeon: Xu Snell MD;  Location: [facility]     REMOVE HARDWARE ARTHROPLASTY SHOULDER  5/2/15    attempted and ended up doing bacteria removal     SURGICAL HISTORY OF -   3/1985    Bilateral Inguinal Herniorrhaphy     SURGICAL HISTORY OF -   5/1997    Breast Lumpectomy-Left-Benign     SURGICAL HISTORY OF -   6/10/2005    Rotator Cuff Repair     SURGICAL HISTORY OF -   10/2005    L5-S1 decompression and fusion with intrumentation     SURGICAL HISTORY OF -   2005    Right Shoulder Arthroplasty     SURGICAL HISTORY OF -   2006    PTCA with stent RCA     SURGICAL HISTORY OF -       Coronary Artery Bypass Graft       ROS as stated above    Soc Hx:   Social History   Substance Use Topics     Smoking status: Former Smoker     Packs/day: 1.00     Years: 40.00     Types: Cigarettes     Start date: 8/8/1956     Quit date: 1/1/1998     Smokeless tobacco: Never Used     Alcohol use 0.0 oz/week      Comment: 2 glasses a wine/2 cans a beer a month       Allergies:   Allergies   Allergen Reactions     Nka [No Known Allergies]        Meds:   No prescriptions prior to admission.       Current Outpatient Prescriptions   Medication Sig Dispense Refill     cefadroxil (DURICEF) 500 MG capsule Take 1 capsule (500 mg) by mouth 2 times daily 60 capsule 3     atenolol (TENORMIN) 50 MG tablet Take 1 tablet (50 mg) by mouth daily 90 tablet 0     atorvastatin (LIPITOR) 80 MG tablet Take 1 tablet (80 mg) by mouth daily 90 tablet 0     carbidopa-levodopa (SINEMET)  MG per tablet Take 2 tablets by mouth daily At bedtime.  Written as 90 day supply. 180 tablet 0     hydrochlorothiazide (HYDRODIURIL) 25 MG tablet Take 1  tablet (25 mg) by mouth every morning 90 tablet 0     lisinopril (PRINIVIL,ZESTRIL) 40 MG tablet Take 1 tablet (40 mg) by mouth daily 90 tablet 0     traZODone (DESYREL) 100 MG tablet Take 1 tablet (100 mg) by mouth At Bedtime 90 tablet 0     lactobacillus rhamnosus, GG, (CULTURELL) capsule Take 1 capsule by mouth daily 90 capsule 11     nitroglycerin (NITROSTAT) 0.4 MG SL tablet Place 1 tablet (0.4 mg) under the tongue every 5 minutes as needed for chest pain 30 tablet 12     albuterol (PROAIR HFA, PROVENTIL HFA, VENTOLIN HFA) 108 (90 BASE) MCG/ACT inhaler Inhale 2 puffs into the lungs every 6 hours as needed for shortness of breath / dyspnea or wheezing 1 Inhaler 11     fluorouracil (EFUDEX) 5 % cream Apply topically 2 times daily (Patient not taking: Reported on 3/14/2017) 40 g 3     fluticasone (FLONASE) 50 MCG/ACT nasal spray Spray 1-2 sprays into both nostrils daily 48 g 3     order for DME Equipment being ordered: CPAP Cas Noriega received a Combined Effort AirSense 10 CPAP. Pressures were set at Auto 11 - 15 cm H2O.       ACETAMINOPHEN PO Take 500 mg by mouth 2 every 6 hours, morning, noon and night. As Needed for shoulder.       Omega-3 Fatty Acids (OMEGA-3 FISH OIL PO) Take by mouth 2 times daily (with meals) 1200mg/360mg       DIPHENHYDRAMINE HCL PO Take by mouth daily       ASPIRIN 325 MG PO TBEC one daily  0     ORDER FOR DME one digital automatic Home blood pressure machine ( per insurance) 1 Device 0     GLUCOSAMINE CHONDR 1500 COMPLX OR glucosamine(1500mg)plus chondroitin(1200mg) takes 1 tablet daily         Physical Exam:  VS:      , Weight   Wt Readings from Last 2 Encounters:   03/14/17 108.9 kg (240 lb)   02/22/17 108.9 kg (240 lb)       Labs:    BMP:  Recent Labs   Lab Test  03/13/17   0745   NA  141   POTASSIUM  4.3   CHLORIDE  105   CO2  28   BUN  14   CR  0.77   GLC  84   TATUM  8.5     LFTs:   Recent Labs   Lab Test  03/13/17   0745   02/26/16   0701   PROTTOTAL   --    --   7.4   ALBUMIN   --     --   3.9   BILITOTAL   --    --   0.5   ALKPHOS   --    --   78   AST   --    --   31   ALT  38   < >  30    < > = values in this interval not displayed.     CBC:   Recent Labs   Lab Test  02/26/16   0701   WBC  7.9   RBC  5.33   HGB  15.8   HCT  47.4   MCV  89   MCH  29.6   MCHC  33.3   RDW  14.1   PLT  203     Coags:  No results for input(s): INR, PTT, FIBR in the last 41029 hours.    ECHO 2015:    Interpretation Summary     Left ventricular systolic function is normal.The visual ejection fraction is  estimated at 60-65%.  The right ventricular systolic function is normal.  No significant valvular regurgitation or stenosis.     Compared to echo dated 05/07/2014 no significant changes.    NUCLEAR STRESS 2015:    GATED MYOCARDIAL PERFUSION SCINTIGRAPHY WITH INTRAVENOUS PHARMACOLOGIC  VASODILATATION LEXISCAN -ONE DAY STUDY     8/10/2015 2:04 PM CHARLES SANCHEZ 70 years Male 1945.     Indication/Clinical History: CAD     Impression  1. Myocardial perfusion imaging using single isotope technique  demonstrated following defects  A. small apical ischemia .   B. basal inferior nontransmural infarct with mild geoffrey-infarct  ischemia     2. Gated images demonstrated no regional wall motion abnormalities.   The left ventricular ejection fraction is 50% with stress.  3. Compared to the prior study dated 11/11/2013 the apical ischemia  was seen in the prior study as well. Inferior wall defect was also  seen in the previous study but was described as fixed..     Physical Exam  Normal systems: dental    Airway   Mallampati: II  TM distance: >3 FB  Neck ROM: limited    Dental     Cardiovascular   Rhythm and rate: regular      Pulmonary    breath sounds clear to auscultation    Other findings: I have examined the  Patient and concur with the above findings - Anjum Lopez MD                Anesthesia Plan      History & Physical Review  History and physical reviewed and following examination; no interval change.    ASA  Status:  3 .    NPO Status:  > 8 hours    Plan for General and ETT with Intravenous induction. Maintenance will be Balanced.    PONV prophylaxis:  Ondansetron (or other 5HT-3) and Dexamethasone or Solumedrol  Additional equipment: 2nd IV - GETA  - Short acting paralytic for intubation, remifentanyl for maintenance in conjunction with volatile anesthetic.   - zofran and decadron for ponv  - take asa 81 on day of surgery if ok per ENT  Preoperative albuterol nebulizer treastment    I have interviewed the patient and performed a physical examination.  There is no interval change in his cardiac history.  His MET activity is >>4 with no symptoms.  His RADz is at baseline.  He has no GERD symptoms.  I concur with the anesthesia plan as outlined by the resident physician.  _ Waylon Lopez MD          Postoperative Care  Postoperative pain management:  Oral pain medications, Multi-modal analgesia and IV analgesics.      Consents  Anesthetic plan, risks, benefits and alternatives discussed with:  Patient..      Kal Cisse MD  [facility]  [phone]

## 2017-03-27 NOTE — ANESTHESIA CARE TRANSFER NOTE
Patient: Cas Noriega    Procedure(s):  Left Cochlear Implant [employer] - Wound Class: I-Clean    Diagnosis: Bilateral Sensorineural Hearing Loss  Diagnosis Additional Information: No value filed.    Anesthesia Type:   General, ETT     Note:  Airway :Face Mask  Patient transferred to:PACU  Comments: Extubated in the OR without difficulty. Patient was making purposeful movements and showing adequate ventilation. Transferred to pacu and sign out given. Stable vitals on arrival.      Kal Cisse Md  [facility]  [phone]      Vitals: (Last set prior to Anesthesia Care Transfer)    CRNA VITALS  3/27/2017 1310 - 3/27/2017 1346      3/27/2017             Pulse: 88    Ht Rate: 89    SpO2: 97 %    Resp Rate (observed): (!)  1                Electronically Signed By: Kal Cisse MD  March 27, 2017  1:46 PM

## 2017-03-27 NOTE — BRIEF OP NOTE
[facility], [place]    Brief Operative Note    Pre-operative diagnosis: Bilateral Sensorineural Hearing Loss  Post-operative diagnosis Same  Procedure: Procedure(s):  Left Cochlear Implant [employer] - Wound Class: I-Clean  Surgeon: Surgeon(s) and Role:     * Mickey Turpin MD - Primary     * Zahra Frazier MD - Resident - Assisting     * Mila Nguyen MD - Resident - Assisting  Anesthesia: General   Estimated blood loss: Less than 10 ml  Drains: None  Specimens: None  Findings:   Normal anatomy, Facial nerve identified and preserved..  Complications: None.  Implants: HiRes Ultra CI HiFocus MS Electrode Serial # [id number] Model# CI-1600-04.    Mila Nguyen MD  Otolaryngology- Head and Neck Surgery PGY2

## 2017-03-27 NOTE — OR NURSING
Dr. Patrick in pt's room in 3C. Discussed my concerns regarding pt's intermittent decreased oxygen saturation. He gave the OK to discharge pt.

## 2017-03-28 NOTE — OP NOTE
DATE OF SURGERY:  03/27/2017      PREOPERATIVE DIAGNOSIS:  Bilateral profound sensorineural hearing loss.      POSTOPERATIVE DIAGNOSIS:  Bilateral profound sensorineural hearing loss.      PROCEDURE PERFORMED:  Left cochlear implantation.  Intraoperative facial nerve monitoring.      SURGEON:  Mickey Turpin MD      ASSISTANTS:  Zahra Frazier MD and Mila Nguyen MD       ANESTHESIA:  General endotracheal anesthesia.      COMPLICATIONS:  None.      ESTIMATED BLOOD LOSS:  20 mL      INDICATIONS:  Mr. Cas Noriega is a 71-year-old gentleman with a history of bilateral profound sensorineural hearing loss.  He underwent a right [employer] cochlear implant years ago and has done quite well with the implant; however, he continues to miss certain things, especially when communicating with other people in noisy backgrounds.  He is anxious to get another cochlear implant for the left hand side.  He had audiologic testing which deemed that he was a cochlear implant candidate for a second side.  He presents for that procedure today.      INTRAOPERATIVE FINDINGS:  The patient had a well-aerated mastoid cavity.  The facial recess was entered, and the facial nerve was identified in the descending segment.  The chorda tympani was identified and preserved.  The round window was visualized, the niche was drilled off, and the cochlear implant, [employer] HiRes Ultra high focus, Mid-Albert electrode with a reference number CI-1600-04 and a serial number of [id number] was inserted through the round window.  There was full insertion of electrode.  There was no resistance that was met.      DESCRIPTION OF PROCEDURE:  The patient was identified in the preoperative holding area, the appropriate patient and procedure was confirmed.  Informed consent was obtained.  The patient was taken back to the operating room by the Anesthesia Service.  He was induced by general anesthetic and orally intubated.  The bed was then  turned 90 degrees.  The patient then had electrodes placed in the left orbicularis oris and orbicularis oculi.  Impedance levels were checked and tap testing was performed to confirm appropriate connection with the monitor.  Facial nerve monitoring was used throughout the entirety of the case.  The patient was then prepped and draped in standard fashion.      We initially injected 1:100,000 epinephrine in the postauricular incision site.  After an appropriate amount of time was allowed for this to take effect, we used a 15 blade to make the postauricular incision.  This incision was carried down to the level of temporalis fascia.  The skin was further elevated.  The postauricular muscles were incised, and then a T-incision was carried down to the mastoid periosteum.  This was elevated off the mastoid cavity.  The mastoid cavity was further exposed.  The operating microscope was brought into position. We then performed a complete mastoidectomy.      Using successively smaller burs, we identified the tegmen mastoideum, the sigmoid sinus, the antral air cell, the horizontal semicircular canal and the short process of the incus.  At this point we then turned our attention to identifying the descending segment of the facial nerve.  The chorda tympani was then identified and preserved.  The facial recess was further opened and the round window niche was visualized.  Decreasing the drill speed to 10,000 RPM, the round window niche was drilled off to further expose the entirety of the round window membrane.  We then packed the middle ear space with epi soaked Gelfoam and turned our attention to creating a bony seat for the  stimulator.      Tissue was elevated off the bone in the area posterior and superior of the mastoid cavity.  A template that was provided by the  was used to justino out a bony seat for the  stimulator.  Using a cutting bur, the bony seat was created with a trough extending to  the mastoid cavity.  At this point the wound was copious irrigated.  A piece of Gelfoam was removed from the ear space, and the round window membrane was again visualized under the operating microscope.  Using a straight pick needle, the round window membrane was incised and further retracted back with a right angle pick.  After appropriate amount of opening was obtained, the implant was brought onto the field.  The  stimulator was placed in the bony seat in the area posterior superior to the mastoid cavity.  The electrode was then inserted through the round window, and the stylet was removed.  There was full insertion of the electrode, and there was no resistance that was met.  Periosteal tissue was packed around the electrode at the round window membrane.  The remaining portion of the electrode then coiled within the mastoid cavity.  The periosteal tissue was then sutured closed and the skin was closed in several layers.  [place]er dressing was then placed.  The patient was turned back to Anesthesia and awoken without difficulty.  There were no complications.         HUNTER FOSTER MD       As dictated by MONA CRISOSTOMO MD            D: 2017 18:07   T: 2017 06:31   MT: [email or web address]      Name:     CHARLES SANCHEZ   MRN:      [id number]        Account:        [id number]   :      1945           Procedure Date: 2017      Document: [id number]        I was present for the entire procedure between opening and closing.    Hunter Foster  Date of Service (when I saw the patient): {3/27/2017

## 2017-04-04 ENCOUNTER — OFFICE VISIT (OUTPATIENT)
Dept: SLEEP MEDICINE | Facility: CLINIC | Age: 72
End: 2017-04-04
Payer: COMMERCIAL

## 2017-04-04 VITALS
WEIGHT: 235.8 LBS | BODY MASS INDEX: 35.74 KG/M2 | DIASTOLIC BLOOD PRESSURE: 75 MMHG | HEIGHT: 68 IN | HEART RATE: 60 BPM | SYSTOLIC BLOOD PRESSURE: 145 MMHG | OXYGEN SATURATION: 96 %

## 2017-04-04 DIAGNOSIS — G47.33 OSA (OBSTRUCTIVE SLEEP APNEA): Primary | ICD-10-CM

## 2017-04-04 PROCEDURE — 99214 OFFICE O/P EST MOD 30 MIN: CPT | Performed by: FAMILY MEDICINE

## 2017-04-04 NOTE — PROGRESS NOTES
"  Sleep Follow-Up Visit:    Date on this visit: 4/4/2017    Cas Noriega comes in today for annual follow-up of severe MARTY (8/30/2005 with AHI ~45, titration PSG on 1/20/2015 with CPAP 11 optimal and high PLM index of ~90/hour) treated with CPAP.     He was very rude to my staff and angry about us \"trying to get money\" from him.    No specific concerns today.  Feels the CPAP works, feels he sleeps longer and better.  Just had left implant placed for coclear implant.       CPAP download from 3/4/2017 - 4/2/2017 on auto-titrate 11-15 cm H2O. Average daily usage of 8:19, used >= 4 hours on 100% of nights. Pressure median 12.4 cm H2O, 95th%ile of 14 cm H2O. AHI 1.4.    Problem List:  Patient Active Problem List    Diagnosis Date Noted     Encounter for long-term (current) use of antibiotics 01/12/2016     Priority: Medium     Life long for shoulder joint infection post op-duricef       Cochlear implant in place 12/22/2015     Priority: Medium     Cochlear implant placed on 12/17/15 by Dr. Dominguez Turpin: Right, [employer], [facility], #[id number].    Cochlear implant placed on 3/27/17 by Dr. Dominguez Turpin: Left, [employer], [facility], #[id number].       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       SEVERE MARTY (obstructive sleep apnea) 01/07/2015     Priority: Medium     [facility]na [facility] 8/30/2005 AHI 46.8 Wes 80% CPAP       CAD (coronary artery disease) 02/27/2013     Priority: Medium     NM Lexiscan 8/10/2015: Small apical ischemia. Basal inferior nontransmural infarct with mild geoffrey-infarct ischemia. EF 50% with stress.    Cardiac echocardiogram 5/7/2014: Preserved LV systolic function, estimated EF 60-65%. No WMA. Trace MR. Trace to mild TR.       S/P shoulder joint replacement 02/26/2013     Priority: Medium     Sensorineural hearing loss, bilateral 07/18/2012     Priority: Medium     Senile nuclear sclerosis 06/03/2012     Priority: " Medium     Advance Care Planning 05/21/2012     Priority: Medium     Advance Care Planning 11/25/2015: Receipt of ACP document:  Received: Health Care Directive which was witnessed or notarized on 12-19-12.  Document previously scanned on 11-10-15.  Validation form completed and sent to be scanned.  Code Status reflects choices in most recent ACP document.  Confirmed/documented designated decision maker(s).  Phone number needed for alternate health care agent.  Added by NORY HUGGINS  Discussed advance care planning with patient; information given to patient to review. 5/21/2012          Intermittent asthma 12/20/2010     Priority: Medium     Rotator cuff disorder 06/23/2010     Priority: Medium     H/o rotator cuff repair on right-6/05  L shoulder surgery pending-Dr. Hartley until insurance changes       Generalized anxiety disorder 04/28/2010     Priority: Medium     Mild major depression (H) 04/28/2010     Priority: Medium     Hypertension goal BP (blood pressure) < 140/90      Priority: Medium     Acute myocardial infarction of other inferior wall, episode of care unspecified      Priority: Medium     2007 and 2008. He then had an inferior wall myocardial infarction and was taken emergently to [facility] were her received 2 coronary stents. He was then scheduled for bypass surgery. Underwent multi-bypass surgery with unfortunately no old records available.       Hyperlipidemia with target LDL less than 70      Priority: Medium     Diagnosis updated by automated process. Provider to review and confirm.       Restless legs syndrome (RLS)      Priority: Medium     Insomnia      Priority: Medium     Lumbago      Priority: Medium     decrease feeling in r foot,,epidural by spinal surgeon, no improvement,-2nd and third and fourth, stand long periods of time, shower, numb in left hip/upper thigh, gets back ache if stands too long  Recommended medication, pt did not want to use, recommended  additional surgery, pt wants to hold          Impression/Plan:    1.)  Severe MARTY (8/30/2005 with AHI ~45, titration PSG on 1/20/2015 with CPAP 11 optimal and high PLM index of ~90/hour) currently treated with auto-titrate CPAP 11-15 cm H2O   - Appears adequately treated per download   - Continue CPAP at current settings   - Discussed recommendations for post-operative management when known severe MARTY     2.)   RLS   - Also seen to have high # of PLM's on PSG 1/20/2015   - Previously, well controlled with sinemet  x2 PO QHS   - We did not discuss sxs today, due to his rude behavior to staff and accusations of only seeing him to get money today.    He will follow up with me in about 2 year(s).     Twenty-five minutes spent with patient, all of which were spent face-to-face counseling, consulting, coordinating plan of care.      Bryce Recinos MD    CC: Xu Wells

## 2017-04-04 NOTE — PATIENT INSTRUCTIONS

## 2017-04-04 NOTE — MR AVS SNAPSHOT
After Visit Summary   4/4/2017    Cas Noriega    MRN: [id number]           Patient Information     Date Of Birth          1945        Visit Information        Provider Department      4/4/2017 9:30 AM Bryce Recions MD [facility]        Today's Diagnoses     MARTY (obstructive sleep apnea)    -  1      Care Instructions      Your BMI is Body mass index is 35.95 kg/(m^2).  Weight management is a personal decision.  If you are interested in exploring weight loss strategies, the following discussion covers the approaches that may be successful. Body mass index (BMI) is one way to tell whether you are at a healthy weight, overweight, or obese. It measures your weight in relation to your height.  A BMI of 18.5 to 24.9 is in the healthy range. A person with a BMI of 25 to 29.9 is considered overweight, and someone with a BMI of 30 or greater is considered obese. More than two-thirds of American adults are considered overweight or obese.  Being overweight or obese increases the risk for further weight gain. Excess weight may lead to heart disease and diabetes.  Creating and following plans for healthy eating and physical activity may help you improve your health.  Weight control is part of healthy lifestyle and includes exercise, emotional health, and healthy eating habits. Careful eating habits lifelong are the mainstay of weight control. Though there are significant health benefits from weight loss, long-term weight loss with diet alone may be very difficult to achieve- studies show long-term success with dietary management in less than 10% of people. Attaining a healthy weight may be especially difficult to achieve in those with severe obesity. In some cases, medications, devices and surgical management might be considered.  What can you do?  If you are overweight or obese and are interested in methods for weight loss, you should discuss this with your provider.      Consider reducing daily calorie intake by 500 calories.     Keep a food journal.     Avoiding skipping meals, consider cutting portions instead.    Diet combined with exercise helps maintain muscle while optimizing fat loss. Strength training is particularly important for building and maintaining muscle mass. Exercise helps reduce stress, increase energy, and improves fitness. Increasing exercise without diet control, however, may not burn enough calories to loose weight.       Start walking three days a week 10-20 minutes at a time    Work towards walking thirty minutes five days a week     Eventually, increase the speed of your walking for 1-2 minutes at time    In addition, we recommend that you review healthy lifestyles and methods for weight loss available through the National Institutes of Health patient information sites:  http://win.niddk.nih.gov/publications/index.htm    And look into health and wellness programs that may be available through your health insurance provider, employer, local community center, or yaakov club.    Weight management plan: Patient was referred to their PCP to discuss a diet and exercise plan.          Follow-ups after your visit        Your next 10 appointments already scheduled     Apr 06, 2017 10:00 AM CDT   Treatment with Carlton Graves PT   [facility] ([facility])    [address] [place] [address]   [phone]            Apr 19, 2017  8:45 AM CDT   LAB with [facility] ([facility])    [address] [facility] [address]   [phone]           Patient must bring picture ID.  Patient should be prepared to give a urine specimen  Please do not eat 10-12 hours before your appointment if you are coming in fasting for labs on lipids, cholesterol, or glucose (sugar).  Pregnant women should follow their Care Team instructions. Water with medications is okay. Do not drink coffee  or other fluids.   If you have concerns about taking  your medications, please ask at office or if scheduling via Allocade, send a message by clicking on Secure Messaging, Message Your Care Team.            Apr 19, 2017  9:00 AM CDT   NM MPI WITH LEXISCAN with WYNM2, WY STRESS TEST   [facility] ([facility])    [address]  [facility] [address]   [phone]           For a ONE day exam: Allow 3-4 hours for test. For a TWO day exam: Allow 2 hours PER day for test.  You may need to stop some medicines before the test. Follow your doctor s orders. - If you take a beta blocker: Follow your doctor s specific instructions on taking it prior to and on the day of your exam. - If you take Aggrenox or dipyridamole (Persantine, Permole), stop taking it 48 hours before your test. - If you take Viagra, Cialis or Levitra, stop taking it 48 hours before your test. - If you take theophylline or aminophylline, stop taking it 12 hours before your test.  For patients with diabetes: - If you take insulin, call your diabetes care team. Ask if you should take a 1/2 dose the morning of your test. - If you take diabetes medicine by mouth, don t take it on the morning of your test. Bring it with you to take after the test. (If you have questions, call your diabetes care team.)  Do not take nitrates on the day of your test. Do not wear your Nitro-Patch.  Stop all caffeine 12 hours before the test. This includes coffee, tea, soda pop, chocolate and certain medicines (such as Anacin, Excedrin and NoDoz). Also avoid decaf coffee and tea, as these contain small amounts of caffeine.  No alcohol, smoking or other tobacco for 12 hours before the test.  Stop eating 3 hours before the test. You may drink water.  Please wear a loose two-piece outfit. If you will have an exercise test, bring rubber-soled walking shoes.  When you arrive, please tell us if you: - Have diabetes - Are breastfeeding - May be  pregnant - Have a pacemaker of ICD (implantable defibrillator).  Please call your Imaging Department at your exam site with any questions.            Apr 21, 2017 11:00 AM CDT   (Arrive by 10:45 AM)   Return Visit with Mickey Turpin MD   [facility] ([facility])    [address] [place] [address]   [phone]            Apr 21, 2017  1:00 PM CDT   Cochlear Implant Return with Vladislav Noble [facility] ([facility])    [address] [place] [address]   [phone]            Apr 26, 2017  2:30 PM CDT   Return Visit with Donny Hua MD   [facility] AT [facility] ([facility])    [address] [place] [address]   [phone]            Apr 27, 2017  2:30 PM CDT   Cochlear Implant Return with Vladislav Noble [facility] ([facility])    [address] [place] [address]   [phone]            May 09, 2017  1:00 PM CDT   Cochlear Implant Return with Vladislav Noble [facility] ([facility])    [address] [place] [address]   [phone]            May 09, 2017  2:00 PM CDT   (Arrive by 1:45 PM)   Evaluation with GAURANG Tomlisnon   [facility] ([facility])    [address] [place] [address]   [phone]              Who to contact     If you have questions or need follow up information about today's clinic visit or your schedule please contact [facility] directly at [phone].  Normal or non-critical lab and imaging results will be communicated to you by MyChart, letter or phone within 4 business days after the clinic has received the results. If you do not hear from us within 7 days, please contact the  "clinic through Appsperset or phone. If you have a critical or abnormal lab result, we will notify you by phone as soon as possible.  Submit refill requests through Embrane or call your pharmacy and they will forward the refill request to us. Please allow 3 business days for your refill to be completed.          Additional Information About Your Visit        Telefonicahart Information     Embrane gives you secure access to your electronic health record. If you see a primary care provider, you can also send messages to your care team and make appointments. If you have questions, please call your primary care clinic.  If you do not have a primary care provider, please call [phone] and they will assist you.        Care EveryWhere ID     This is your Care EveryWhere ID. This could be used by other organizations to access your [place] medical records  [id number]        Your Vitals Were     Pulse Height Pulse Oximetry BMI (Body Mass Index)          60 1.725 m (5' 7.91\") 96% 35.95 kg/m2         Blood Pressure from Last 3 Encounters:   04/04/17 145/75   03/27/17 131/75   03/14/17 121/59    Weight from Last 3 Encounters:   04/04/17 107 kg (235 lb 12.8 oz)   03/27/17 106.9 kg (235 lb 10.8 oz)   03/14/17 108.9 kg (240 lb)              We Performed the Following     Comprehensive DME        Primary Care Provider Office Phone # Fax #    Xu Wells PA-C [phone]       [address] [address]        Thank you!     Thank you for choosing [facility]  for your care. Our goal is always to provide you with excellent care. Hearing back from our patients is one way we can continue to improve our services. Please take a few minutes to complete the written survey that you may receive in the mail after your visit with us. Thank you!             Your Updated Medication List - Protect others around you: Learn how to safely use, store and throw away your medicines at " www.disposemymeds.org.          This list is accurate as of: 4/4/17  9:38 AM.  Always use your most recent med list.                   Brand Name Dispense Instructions for use    ACETAMINOPHEN PO      Take 500 mg by mouth 2 every 6 hours, morning, noon and night. As Needed for shoulder.       albuterol 108 (90 BASE) MCG/ACT Inhaler    PROAIR HFA/PROVENTIL HFA/VENTOLIN HFA    1 Inhaler    Inhale 2 puffs into the lungs every 6 hours as needed for shortness of breath / dyspnea or wheezing       aspirin 325 MG EC tablet      one daily       atenolol 50 MG tablet    TENORMIN    90 tablet    Take 1 tablet (50 mg) by mouth daily       atorvastatin 80 MG tablet    LIPITOR    90 tablet    Take 1 tablet (80 mg) by mouth daily       carbidopa-levodopa  MG per tablet    SINEMET    180 tablet    Take 2 tablets by mouth daily At bedtime.  Written as 90 day supply.       cefadroxil 500 MG capsule    DURICEF    60 capsule    Take 1 capsule (500 mg) by mouth 2 times daily       DIPHENHYDRAMINE HCL PO      Take by mouth daily       fluorouracil 5 % cream    EFUDEX    40 g    Apply topically 2 times daily       fluticasone 50 MCG/ACT spray    FLONASE    48 g    Spray 1-2 sprays into both nostrils daily       GLUCOSAMINE CHONDR 1500 COMPLX PO      glucosamine(1500mg)plus chondroitin(1200mg) takes 1 tablet daily       hydrochlorothiazide 25 MG tablet    HYDRODIURIL    90 tablet    Take 1 tablet (25 mg) by mouth every morning       lactobacillus rhamnosus (GG) capsule     90 capsule    Take 1 capsule by mouth daily       lisinopril 40 MG tablet    PRINIVIL/ZESTRIL    90 tablet    Take 1 tablet (40 mg) by mouth daily       nitroglycerin 0.4 MG sublingual tablet    NITROSTAT    30 tablet    Place 1 tablet (0.4 mg) under the tongue every 5 minutes as needed for chest pain       OMEGA-3 FISH OIL PO      Take by mouth 2 times daily (with meals) 1200mg/360mg       order for DME      Equipment being ordered: THUY Noriega  received a Resmed AirSense 10 CPAP. Pressures were set at Auto 11 - 15 cm H2O.       order for DME     1 Device    one digital automatic Home blood pressure machine ( per insurance)       oxyCODONE 5 MG IR tablet    ROXICODONE    30 tablet    Take 1-2 tablets (5-10 mg) by mouth every 4 hours as needed for pain or other (Moderate to Severe)       senna-docusate 8.6-50 MG per tablet    SENOKOT-S;PERICOLACE    30 tablet    Take 1-2 tablets by mouth 2 times daily Take while on oral narcotics to prevent or treat constipation.       traZODone 100 MG tablet    DESYREL    90 tablet    Take 1 tablet (100 mg) by mouth At Bedtime

## 2017-04-06 ENCOUNTER — HOSPITAL ENCOUNTER (OUTPATIENT)
Dept: PHYSICAL THERAPY | Facility: CLINIC | Age: 72
Setting detail: THERAPIES SERIES
End: 2017-04-06
Attending: FAMILY MEDICINE
Payer: MEDICARE

## 2017-04-06 PROCEDURE — 97110 THERAPEUTIC EXERCISES: CPT | Mod: GP

## 2017-04-06 PROCEDURE — 97140 MANUAL THERAPY 1/> REGIONS: CPT | Mod: GP

## 2017-04-06 PROCEDURE — 40000718 ZZHC STATISTIC PT DEPARTMENT ORTHO VISIT

## 2017-04-07 ENCOUNTER — OFFICE VISIT (OUTPATIENT)
Dept: URGENT CARE | Facility: URGENT CARE | Age: 72
End: 2017-04-07
Payer: COMMERCIAL

## 2017-04-07 VITALS
HEART RATE: 70 BPM | OXYGEN SATURATION: 98 % | DIASTOLIC BLOOD PRESSURE: 71 MMHG | SYSTOLIC BLOOD PRESSURE: 125 MMHG | HEIGHT: 68 IN | WEIGHT: 233 LBS | BODY MASS INDEX: 35.31 KG/M2 | TEMPERATURE: 99.1 F

## 2017-04-07 DIAGNOSIS — R09.82 POST-NASAL DRIP: Primary | ICD-10-CM

## 2017-04-07 DIAGNOSIS — R07.0 THROAT PAIN: ICD-10-CM

## 2017-04-07 LAB
DEPRECATED S PYO AG THROAT QL EIA: NORMAL
MICRO REPORT STATUS: NORMAL
SPECIMEN SOURCE: NORMAL

## 2017-04-07 PROCEDURE — 87081 CULTURE SCREEN ONLY: CPT | Performed by: NURSE PRACTITIONER

## 2017-04-07 PROCEDURE — 99213 OFFICE O/P EST LOW 20 MIN: CPT | Performed by: NURSE PRACTITIONER

## 2017-04-07 PROCEDURE — 87880 STREP A ASSAY W/OPTIC: CPT | Performed by: NURSE PRACTITIONER

## 2017-04-07 NOTE — MR AVS SNAPSHOT
After Visit Summary   4/7/2017    Cas Noriega    MRN: [id number]           Patient Information     Date Of Birth          1945        Visit Information        Provider Department      4/7/2017 5:05 PM Jody Jade APRN [facility]        Today's Diagnoses     Post-nasal drip    -  1    Throat pain          Care Instructions    Increase rest and fluids. Tylenol and/or Ibuprofen for comfort. Cool mist vaporizer. If your symptoms worsen or do not resolve follow up with your primary care provider in 1 week and sooner if needed.      Mucinex 600 mg 12 hour formula twice daily can help with the post nasal drip.  Simply Saline nasal spray is very gentle to help with nasal congestion.  Indications for emergent return to emergency department discussed with patient, who verbalized good understanding and agreement.  Patient understands the limitations of today's evaluation.           Self-Care for Sore Throats  Sore throats occur for many reasons, such as colds, allergies, and infections caused by viruses or bacteria. In any case, your throat becomes red and sore. Your goal for self-care is to reduce your discomfort while giving your throat a chance to heal.    Moisten and Soothe Your Throat    Try a sip of water first thing after waking up.    Keep your throat moist by drinking 6 or more glasses of clear liquids every day.    Run a cool-air humidifier in your room overnight.    Avoid cigarette smoke.     Suck on throat lozenges, cough drops, hard candy, ice chips, or frozen fruit-juice bars. Use the sugar-free versions if your diet or medical condition require them.  Gargle to Ease Irritation  Gargling every hour or 2 can ease irritation. Try gargling with 1 of these solutions:    1/4 teaspoon of salt in 1/2 cup of warm water    An over-the-counter anesthetic gargle  Use Medication for More Relief  Over-the-counter medication can reduce sore throat  symptoms. Ask your pharmacist if you have questions about which medication to use:    Ease pain with anesthetic sprays. Aspirin or an aspirin substitute also helps. Remember, never give aspirin to anyone 18 or younger, or if you are already taking blood thinners.     For sore throats caused by allergies, try antihistamines to block the allergic reaction.    Remember: unless a sore throat is caused by a bacterial infection, antibiotics won t help you.  Prevent Future Sore Throats    Stop smoking or reduce contact with secondhand smoke. Smoke irritates the tender throat lining.    Limit contact with pets and with allergy-causing substances, such as pollen and mold.    When you re around someone with a sore throat or cold, wash your hands frequently to keep viruses or bacteria from spreading.    Don t strain your vocal cords.  Call Your Health Care Provider  Contact your doctor if you have:    A temperature over 101 F (38.3 C)    White spots on the throat    Great difficulty swallowing    Trouble breathing    A skin rash    Recent exposure to someone else with strep bacteria    Severe hoarseness and swollen glands in the neck or jaw     8616-3964 [employer]. [address] [place] [address]. All rights reserved. This information is not intended as a substitute for professional medical care. Always follow your healthcare professional's instructions.              Follow-ups after your visit        Follow-up notes from your care team     See patient instructions section of the AVS Return in about 1 week (around 4/14/2017), or if symptoms worsen or fail to improve, for Follow up with your primary care provider.      Your next 10 appointments already scheduled     Apr 10, 2017  3:00 PM CDT   Treatment with Carlton Graves PT   [facility] Physical [facility] ([facility])    [address] [place] [address]   [phone]            Apr 13, 2017  9:30 AM CDT   Treatment  with Carlton Graves PT   [facility] Physical Therapy ([facility])    [address] [place] [address]   [phone]            Apr 17, 2017  9:30 AM CDT   Treatment with Carlton Graves PT   [facility] Therapy ([facility])    [address] [place] [phone]   [phone]            Apr 19, 2017  8:45 AM CDT   LAB with [place] LAB   [facility] ([facility])    [address] [facility] [address]   [phone]           Patient must bring picture ID.  Patient should be prepared to give a urine specimen  Please do not eat 10-12 hours before your appointment if you are coming in fasting for labs on lipids, cholesterol, or glucose (sugar).  Pregnant women should follow their Care Team instructions. Water with medications is okay. Do not drink coffee or other fluids.   If you have concerns about taking  your medications, please ask at office or if scheduling via [employer], send a message by clicking on Secure Messaging, Message Your Care Team.            Apr 19, 2017  9:00 AM CDT   NM MPI WITH LEXISCAN with WYNM2, [place] STRESS TEST   [facility] ([facility])    [address] [facility] [address]   [phone]           For a ONE day exam: Allow 3-4 hours for test. For a TWO day exam: Allow 2 hours PER day for test.  You may need to stop some medicines before the test. Follow your doctor s orders. - If you take a beta blocker: Follow your doctor s specific instructions on taking it prior to and on the day of your exam. - If you take Aggrenox or dipyridamole (Persantine, Permole), stop taking it 48 hours before your test. - If you take Viagra, Cialis or Levitra, stop taking it 48 hours before your test. - If you take theophylline or aminophylline, stop taking it 12 hours before your test.  For patients with diabetes: - If you take insulin, call your diabetes care team.  Ask if you should take a 1/2 dose the morning of your test. - If you take diabetes medicine by mouth, don t take it on the morning of your test. Bring it with you to take after the test. (If you have questions, call your diabetes care team.)  Do not take nitrates on the day of your test. Do not wear your Nitro-Patch.  Stop all caffeine 12 hours before the test. This includes coffee, tea, soda pop, chocolate and certain medicines (such as Anacin, Excedrin and NoDoz). Also avoid decaf coffee and tea, as these contain small amounts of caffeine.  No alcohol, smoking or other tobacco for 12 hours before the test.  Stop eating 3 hours before the test. You may drink water.  Please wear a loose two-piece outfit. If you will have an exercise test, bring rubber-soled walking shoes.  When you arrive, please tell us if you: - Have diabetes - Are breastfeeding - May be pregnant - Have a pacemaker of ICD (implantable defibrillator).  Please call your Imaging Department at your exam site with any questions.            Apr 20, 2017  9:30 AM CDT   Treatment with Carlton Graves PT   [facility] Physical Therapy ([facility])    [address] [place] [id number] [phone]            Apr 21, 2017 11:00 AM CDT   (Arrive by 10:45 AM)   Return Visit with Mickey Turpin MD   [facility] ([facility])    [address] [place] [address]   [phone]            Apr 21, 2017  1:00 PM CDT   Cochlear Implant Return with Vladislav Noble   [facility] Audiology ([facility])    [address] [place] [address]   [phone]            Apr 26, 2017  2:30 PM CDT   Return Visit with Donny Hua MD   [facility]YSIC[facility] AT [facility] ([facility])    [address] [facility] [id number] [phone]              Who to contact     If you have  "questions or need follow up information about today's clinic visit or your schedule please contact [facility] directly at [phone].  Normal or non-critical lab and imaging results will be communicated to you by MyChart, letter or phone within 4 business days after the clinic has received the results. If you do not hear from us within 7 days, please contact the clinic through [employer] or phone. If you have a critical or abnormal lab result, we will notify you by phone as soon as possible.  Submit refill requests through [employer] or call your pharmacy and they will forward the refill request to us. Please allow 3 business days for your refill to be completed.          Additional Information About Your Visit        [employer] Information     [employer] gives you secure access to your electronic health record. If you see a primary care provider, you can also send messages to your care team and make appointments. If you have questions, please call your primary care clinic.  If you do not have a primary care provider, please call [phone] and they will assist you.        Care EveryWhere ID     This is your Care EveryWhere ID. This could be used by other organizations to access your [place] medical records  [id number]        Your Vitals Were     Pulse Temperature Height Pulse Oximetry BMI (Body Mass Index)       70 99.1  F (37.3  C) (Tympanic) 5' 7.9\" (1.725 m) 98% 35.53 kg/m2        Blood Pressure from Last 3 Encounters:   04/07/17 125/71   04/04/17 145/75   03/27/17 131/75    Weight from Last 3 Encounters:   04/07/17 233 lb (105.7 kg)   04/04/17 235 lb 12.8 oz (107 kg)   03/27/17 235 lb 10.8 oz (106.9 kg)              We Performed the Following     Beta strep group A culture     Strep, Rapid Screen        Primary Care Provider Office Phone # Fax #    Xu Wells PA-C [phone]       [facility] [address] [address]        Thank you!     " Thank you for choosing [facility]  for your care. Our goal is always to provide you with excellent care. Hearing back from our patients is one way we can continue to improve our services. Please take a few minutes to complete the written survey that you may receive in the mail after your visit with us. Thank you!             Your Updated Medication List - Protect others around you: Learn how to safely use, store and throw away your medicines at www.disposemymeds.org.          This list is accurate as of: 4/7/17  6:11 PM.  Always use your most recent med list.                   Brand Name Dispense Instructions for use    ACETAMINOPHEN PO      Take 500 mg by mouth 2 every 6 hours, morning, noon and night. As Needed for shoulder.       albuterol 108 (90 BASE) MCG/ACT Inhaler    PROAIR HFA/PROVENTIL HFA/VENTOLIN HFA    1 Inhaler    Inhale 2 puffs into the lungs every 6 hours as needed for shortness of breath / dyspnea or wheezing       aspirin 325 MG EC tablet      one daily       atenolol 50 MG tablet    TENORMIN    90 tablet    Take 1 tablet (50 mg) by mouth daily       atorvastatin 80 MG tablet    LIPITOR    90 tablet    Take 1 tablet (80 mg) by mouth daily       carbidopa-levodopa  MG per tablet    SINEMET    180 tablet    Take 2 tablets by mouth daily At bedtime.  Written as 90 day supply.       cefadroxil 500 MG capsule    DURICEF    60 capsule    Take 1 capsule (500 mg) by mouth 2 times daily       DIPHENHYDRAMINE HCL PO      Take by mouth daily Reported on 4/7/2017       fluorouracil 5 % cream    EFUDEX    40 g    Apply topically 2 times daily       fluticasone 50 MCG/ACT spray    FLONASE    48 g    Spray 1-2 sprays into both nostrils daily       GLUCOSAMINE CHONDR 1500 COMPLX PO      glucosamine(1500mg)plus chondroitin(1200mg) takes 1 tablet daily       hydrochlorothiazide 25 MG tablet    HYDRODIURIL    90 tablet    Take 1 tablet (25 mg) by mouth every morning        lactobacillus rhamnosus (GG) capsule     90 capsule    Take 1 capsule by mouth daily       lisinopril 40 MG tablet    PRINIVIL/ZESTRIL    90 tablet    Take 1 tablet (40 mg) by mouth daily       nitroglycerin 0.4 MG sublingual tablet    NITROSTAT    30 tablet    Place 1 tablet (0.4 mg) under the tongue every 5 minutes as needed for chest pain       OMEGA-3 FISH OIL PO      Take by mouth 2 times daily (with meals) 1200mg/360mg       order for DME      Equipment being ordered: CPAP Cas Noriega received a Vericept AirSense 10 CPAP. Pressures were set at Auto 11 - 15 cm H2O.       order for DME     1 Device    one digital automatic Home blood pressure machine ( per insurance)       oxyCODONE 5 MG IR tablet    ROXICODONE    30 tablet    Take 1-2 tablets (5-10 mg) by mouth every 4 hours as needed for pain or other (Moderate to Severe)       senna-docusate 8.6-50 MG per tablet    SENOKOT-S;PERICOLACE    30 tablet    Take 1-2 tablets by mouth 2 times daily Take while on oral narcotics to prevent or treat constipation.       traZODone 100 MG tablet    DESYREL    90 tablet    Take 1 tablet (100 mg) by mouth At Bedtime

## 2017-04-07 NOTE — PATIENT INSTRUCTIONS
Increase rest and fluids. Tylenol and/or Ibuprofen for comfort. Cool mist vaporizer. If your symptoms worsen or do not resolve follow up with your primary care provider in 1 week and sooner if needed.      Mucinex 600 mg 12 hour formula twice daily can help with the post nasal drip.  Simply Saline nasal spray is very gentle to help with nasal congestion.  Indications for emergent return to emergency department discussed with patient, who verbalized good understanding and agreement.  Patient understands the limitations of today's evaluation.           Self-Care for Sore Throats  Sore throats occur for many reasons, such as colds, allergies, and infections caused by viruses or bacteria. In any case, your throat becomes red and sore. Your goal for self-care is to reduce your discomfort while giving your throat a chance to heal.    Moisten and Soothe Your Throat    Try a sip of water first thing after waking up.    Keep your throat moist by drinking 6 or more glasses of clear liquids every day.    Run a cool-air humidifier in your room overnight.    Avoid cigarette smoke.     Suck on throat lozenges, cough drops, hard candy, ice chips, or frozen fruit-juice bars. Use the sugar-free versions if your diet or medical condition require them.  Gargle to Ease Irritation  Gargling every hour or 2 can ease irritation. Try gargling with 1 of these solutions:    1/4 teaspoon of salt in 1/2 cup of warm water    An over-the-counter anesthetic gargle  Use Medication for More Relief  Over-the-counter medication can reduce sore throat symptoms. Ask your pharmacist if you have questions about which medication to use:    Ease pain with anesthetic sprays. Aspirin or an aspirin substitute also helps. Remember, never give aspirin to anyone 18 or younger, or if you are already taking blood thinners.     For sore throats caused by allergies, try antihistamines to block the allergic reaction.    Remember: unless a sore throat is caused by a  bacterial infection, antibiotics won t help you.  Prevent Future Sore Throats    Stop smoking or reduce contact with secondhand smoke. Smoke irritates the tender throat lining.    Limit contact with pets and with allergy-causing substances, such as pollen and mold.    When you re around someone with a sore throat or cold, wash your hands frequently to keep viruses or bacteria from spreading.    Don t strain your vocal cords.  Call Your Health Care Provider  Contact your doctor if you have:    A temperature over 101 F (38.3 C)    White spots on the throat    Great difficulty swallowing    Trouble breathing    A skin rash    Recent exposure to someone else with strep bacteria    Severe hoarseness and swollen glands in the neck or jaw     3437-1316 [employer]. [address] [place] [address], [place] [address]. All rights reserved. This information is not intended as a substitute for professional medical care. Always follow your healthcare professional's instructions.

## 2017-04-07 NOTE — PROGRESS NOTES
SUBJECTIVE:                                                    Cas Noriega is a 71 year old male who presents to clinic today for the following health issues:      Patient has been been spitting up clumps of blood .  Very hard to swallow and sore in mornings especially.    This is in morning only. Just had cochlear implant surgery on left about 10 days ago. Has drainage down throat and throat is sore in morning and coughs up green blood tinged drainage but it all gets better as day goes on.    Problem list and histories reviewed & adjusted, as indicated.  Additional history: as documented    Patient Active Problem List   Diagnosis     Hypertension goal BP (blood pressure) < 140/90     Acute myocardial infarction of other inferior wall, episode of care unspecified     Hyperlipidemia with target LDL less than 70     Restless legs syndrome (RLS)     Insomnia     Generalized anxiety disorder     Mild major depression (H)     Lumbago     Rotator cuff disorder     Intermittent asthma     Advance Care Planning     Senile nuclear sclerosis     Sensorineural hearing loss, bilateral     S/P shoulder joint replacement     CAD (coronary artery disease)     SEVERE MARTY (obstructive sleep apnea)     Infection of prosthetic shoulder joint (H)     Cochlear implant in place     Encounter for long-term (current) use of antibiotics     Past Surgical History:   Procedure Laterality Date     ARTHROPLASTY SHOULDER  2/26/2013    Procedure: ARTHROPLASTY SHOULDER;  Left Total Shoulder Arthropasty;  Surgeon: Xu Snell MD;  Location: [facility]     CHOLECYSTECTOMY, LAPOROSCOPIC  3/2007    Cholecystectomy, Laparoscopic     COLONOSCOPY  8/2009    Benign polyp     COLONOSCOPY N/A 3/25/2015    Procedure: COLONOSCOPY;  Surgeon: Deejay Marie MD;  Location: [facility]     IMPLANT COCHLEA WITH NERVE INTEGRITY MONITOR Right 12/17/2015    Procedure: IMPLANT COCHLEA WITH NERVE INTEGRITY MONITOR;  Surgeon: Mickey Turpin MD;   [facility]     IMPLANT COCHLEA WITH NERVE INTEGRITY MONITOR Left 3/27/2017    Procedure: IMPLANT COCHLEA WITH NERVE INTEGRITY MONITOR;  Surgeon: Mickey Turpin MD;  [facility]     PHACOEMULSIFICATION WITH STANDARD INTRAOCULAR LENS IMPLANT  6/4/2012    Procedure:PHACOEMULSIFICATION WITH STANDARD INTRAOCULAR LENS IMPLANT; Left kelman phacoemulsification with intraocular lens implant; Surgeon:DAYDAY HILL; Location:[facility]     PHACOEMULSIFICATION WITH STANDARD INTRAOCULAR LENS IMPLANT  7/26/2012    Procedure: PHACOEMULSIFICATION WITH STANDARD INTRAOCULAR LENS IMPLANT;  Right Kelman phacoemulsification with intraocular lens implant;  Surgeon: Dayday Hill MD;  Location: [facility]     RELEASE CARPAL TUNNEL  10/23/2012    Procedure: RELEASE CARPAL TUNNEL;  Right carpal tunnel release;  Surgeon: Xu Snell MD;  Location: [facility]     RELEASE CARPAL TUNNEL  11/9/2012    Procedure: RELEASE CARPAL TUNNEL;  Left carpal tunnel release;  Surgeon: Xu Snell MD;  Location: [facility]     REMOVE HARDWARE ARTHROPLASTY SHOULDER  5/2/15    attempted and ended up doing bacteria removal     SURGICAL HISTORY OF -   3/1985    Bilateral Inguinal Herniorrhaphy     SURGICAL HISTORY OF -   5/1997    Breast Lumpectomy-Left-Benign     SURGICAL HISTORY OF -   6/10/2005    Rotator Cuff Repair     SURGICAL HISTORY OF -   10/2005    L5-S1 decompression and fusion with intrumentation     SURGICAL HISTORY OF -   2005    Right Shoulder Arthroplasty     SURGICAL HISTORY OF -   2006    PTCA with stent RCA     SURGICAL HISTORY OF -       Coronary Artery Bypass Graft       Social History   Substance Use Topics     Smoking status: Former Smoker     Packs/day: 1.00     Years: 40.00     Types: Cigarettes     Start date: 8/8/1956     Quit date: 1/1/1998     Smokeless tobacco: Never Used     Alcohol use 0.0 oz/week      Comment: 2 glasses a wine/2 cans a beer a month     Family History   Problem Relation Age of Onset      Hypertension Mother      CANCER Father      bone cancer         Current Outpatient Prescriptions   Medication Sig Dispense Refill     cefadroxil (DURICEF) 500 MG capsule Take 1 capsule (500 mg) by mouth 2 times daily 60 capsule 3     atenolol (TENORMIN) 50 MG tablet Take 1 tablet (50 mg) by mouth daily 90 tablet 0     atorvastatin (LIPITOR) 80 MG tablet Take 1 tablet (80 mg) by mouth daily 90 tablet 0     carbidopa-levodopa (SINEMET)  MG per tablet Take 2 tablets by mouth daily At bedtime.  Written as 90 day supply. 180 tablet 0     hydrochlorothiazide (HYDRODIURIL) 25 MG tablet Take 1 tablet (25 mg) by mouth every morning 90 tablet 0     lisinopril (PRINIVIL,ZESTRIL) 40 MG tablet Take 1 tablet (40 mg) by mouth daily 90 tablet 0     traZODone (DESYREL) 100 MG tablet Take 1 tablet (100 mg) by mouth At Bedtime 90 tablet 0     lactobacillus rhamnosus, GG, (CULTURELL) capsule Take 1 capsule by mouth daily 90 capsule 11     order for DME Equipment being ordered: CPAP Cardozo RAIMUNDO Joneson received a [employer] AirSense 10 CPAP. Pressures were set at Auto 11 - 15 cm H2O.       ACETAMINOPHEN PO Take 500 mg by mouth 2 every 6 hours, morning, noon and night. As Needed for shoulder.       Omega-3 Fatty Acids (OMEGA-3 FISH OIL PO) Take by mouth 2 times daily (with meals) 1200mg/360mg       ORDER FOR DME one digital automatic Home blood pressure machine ( per insurance) 1 Device 0     GLUCOSAMINE CHONDR 1500 COMPLX OR glucosamine(1500mg)plus chondroitin(1200mg) takes 1 tablet daily       oxyCODONE (ROXICODONE) 5 MG IR tablet Take 1-2 tablets (5-10 mg) by mouth every 4 hours as needed for pain or other (Moderate to Severe) (Patient not taking: Reported on 4/7/2017) 30 tablet 0     senna-docusate (SENOKOT-S;PERICOLACE) 8.6-50 MG per tablet Take 1-2 tablets by mouth 2 times daily Take while on oral narcotics to prevent or treat constipation. (Patient not taking: Reported on 4/7/2017) 30 tablet 0     nitroglycerin (NITROSTAT) 0.4 MG  "SL tablet Place 1 tablet (0.4 mg) under the tongue every 5 minutes as needed for chest pain (Patient not taking: Reported on 4/7/2017) 30 tablet 12     albuterol (PROAIR HFA, PROVENTIL HFA, VENTOLIN HFA) 108 (90 BASE) MCG/ACT inhaler Inhale 2 puffs into the lungs every 6 hours as needed for shortness of breath / dyspnea or wheezing (Patient not taking: Reported on 4/7/2017) 1 Inhaler 11     fluorouracil (EFUDEX) 5 % cream Apply topically 2 times daily (Patient not taking: Reported on 4/7/2017) 40 g 3     fluticasone (FLONASE) 50 MCG/ACT nasal spray Spray 1-2 sprays into both nostrils daily 48 g 3     DIPHENHYDRAMINE HCL PO Take by mouth daily Reported on 4/7/2017       ASPIRIN 325 MG PO TBEC one daily  0     Allergies   Allergen Reactions     Nka [No Known Allergies]      Labs reviewed in EPIC    Reviewed and updated as needed this visit by clinical staff  Tobacco  Allergies  Meds  Problems  Med Hx  Surg Hx  Fam Hx  Soc Hx        Reviewed and updated as needed this visit by Provider  Allergies  Meds  Problems         ROS:  Constitutional, HEENT, cardiovascular, pulmonary, GI, , musculoskeletal, neuro, skin, endocrine and psych systems are negative, except as otherwise noted.    OBJECTIVE:                                                    /71 (BP Location: Right arm, Cuff Size: Adult Large)  Pulse 70  Temp 99.1  F (37.3  C) (Tympanic)  Ht 5' 7.9\" (1.725 m)  Wt 233 lb (105.7 kg)  SpO2 98%  BMI 35.53 kg/m2  Body mass index is 35.53 kg/(m^2).  GENERAL: healthy, alert and no distress, nontoxic in appearance, hard of hearing, has cochlear implant on right and just has a new one end of March on left side. Goes for hardware on April 21.  EYES: Eyes grossly normal to inspection, PERRL and conjunctivae and sclerae normal  HENT: ear canals and TM's normal, nose and mouth without ulcers or lesions  NECK: no adenopathy, supple with full ROM  RESP: lungs clear to auscultation - no rales, rhonchi or " wheezes  CV: regular rate and rhythm, normal S1 S2, no S3 or S4, no murmur, click or rub, no peripheral edema   ABDOMEN: soft, nontender, no hepatosplenomegaly, no masses   MS: no gross musculoskeletal defects noted, no edema  No rash    Diagnostic Test Results:  Results for orders placed or performed in visit on 04/07/17 (from the past 24 hour(s))   Strep, Rapid Screen   Result Value Ref Range    Specimen Description Throat     Rapid Strep A Screen       NEGATIVE: No Group A streptococcal antigen detected by immunoassay, await   culture report.      Micro Report Status FINAL 04/07/2017         ASSESSMENT/PLAN:                                                      Problem List Items Addressed This Visit     None      Visit Diagnoses     Post-nasal drip    -  Primary    Throat pain        Relevant Orders    Strep, Rapid Screen (Completed)    Beta strep group A culture (Completed)               Patient Instructions     Increase rest and fluids. Tylenol and/or Ibuprofen for comfort. Cool mist vaporizer. If your symptoms worsen or do not resolve follow up with your primary care provider in 1 week and sooner if needed.      Mucinex 600 mg 12 hour formula twice daily can help with the post nasal drip.  Simply Saline nasal spray is very gentle to help with nasal congestion.  Indications for emergent return to emergency department discussed with patient, who verbalized good understanding and agreement.  Patient understands the limitations of today's evaluation.           Self-Care for Sore Throats  Sore throats occur for many reasons, such as colds, allergies, and infections caused by viruses or bacteria. In any case, your throat becomes red and sore. Your goal for self-care is to reduce your discomfort while giving your throat a chance to heal.    Moisten and Soothe Your Throat    Try a sip of water first thing after waking up.    Keep your throat moist by drinking 6 or more glasses of clear liquids every day.    Run a  cool-air humidifier in your room overnight.    Avoid cigarette smoke.     Suck on throat lozenges, cough drops, hard candy, ice chips, or frozen fruit-juice bars. Use the sugar-free versions if your diet or medical condition require them.  Gargle to Ease Irritation  Gargling every hour or 2 can ease irritation. Try gargling with 1 of these solutions:    1/4 teaspoon of salt in 1/2 cup of warm water    An over-the-counter anesthetic gargle  Use Medication for More Relief  Over-the-counter medication can reduce sore throat symptoms. Ask your pharmacist if you have questions about which medication to use:    Ease pain with anesthetic sprays. Aspirin or an aspirin substitute also helps. Remember, never give aspirin to anyone 18 or younger, or if you are already taking blood thinners.     For sore throats caused by allergies, try antihistamines to block the allergic reaction.    Remember: unless a sore throat is caused by a bacterial infection, antibiotics won t help you.  Prevent Future Sore Throats    Stop smoking or reduce contact with secondhand smoke. Smoke irritates the tender throat lining.    Limit contact with pets and with allergy-causing substances, such as pollen and mold.    When you re around someone with a sore throat or cold, wash your hands frequently to keep viruses or bacteria from spreading.    Don t strain your vocal cords.  Call Your Health Care Provider  Contact your doctor if you have:    A temperature over 101 F (38.3 C)    White spots on the throat    Great difficulty swallowing    Trouble breathing    A skin rash    Recent exposure to someone else with strep bacteria    Severe hoarseness and swollen glands in the neck or jaw     2737-7799 [employer]. [address] [place] [address], [place] [address]. All rights reserved. This information is not intended as a substitute for professional medical care. Always follow your healthcare professional's instructions.            Jody Ibanez  GEOFFREY Jade [facility]

## 2017-04-09 LAB
BACTERIA SPEC CULT: NORMAL
MICRO REPORT STATUS: NORMAL
SPECIMEN SOURCE: NORMAL

## 2017-04-10 ENCOUNTER — HOSPITAL ENCOUNTER (OUTPATIENT)
Dept: PHYSICAL THERAPY | Facility: CLINIC | Age: 72
Setting detail: THERAPIES SERIES
End: 2017-04-10
Attending: FAMILY MEDICINE
Payer: MEDICARE

## 2017-04-10 PROCEDURE — 97110 THERAPEUTIC EXERCISES: CPT | Mod: GP

## 2017-04-10 PROCEDURE — 40000718 ZZHC STATISTIC PT DEPARTMENT ORTHO VISIT

## 2017-04-13 ENCOUNTER — HOSPITAL ENCOUNTER (OUTPATIENT)
Dept: PHYSICAL THERAPY | Facility: CLINIC | Age: 72
Setting detail: THERAPIES SERIES
End: 2017-04-13
Attending: FAMILY MEDICINE
Payer: MEDICARE

## 2017-04-13 PROCEDURE — 97110 THERAPEUTIC EXERCISES: CPT | Mod: GP

## 2017-04-13 PROCEDURE — 40000718 ZZHC STATISTIC PT DEPARTMENT ORTHO VISIT

## 2017-04-13 PROCEDURE — 97140 MANUAL THERAPY 1/> REGIONS: CPT | Mod: GP

## 2017-04-17 ENCOUNTER — HOSPITAL ENCOUNTER (OUTPATIENT)
Dept: PHYSICAL THERAPY | Facility: CLINIC | Age: 72
Setting detail: THERAPIES SERIES
End: 2017-04-17
Attending: FAMILY MEDICINE
Payer: MEDICARE

## 2017-04-17 PROCEDURE — 40000718 ZZHC STATISTIC PT DEPARTMENT ORTHO VISIT

## 2017-04-17 PROCEDURE — 97110 THERAPEUTIC EXERCISES: CPT | Mod: GP

## 2017-04-18 DIAGNOSIS — I10 HYPERTENSION GOAL BP (BLOOD PRESSURE) < 140/90: Primary | ICD-10-CM

## 2017-04-18 DIAGNOSIS — I25.119 CORONARY ARTERY DISEASE INVOLVING NATIVE HEART WITH ANGINA PECTORIS, UNSPECIFIED VESSEL OR LESION TYPE (H): ICD-10-CM

## 2017-04-19 ENCOUNTER — HOSPITAL ENCOUNTER (OUTPATIENT)
Dept: NUCLEAR MEDICINE | Facility: CLINIC | Age: 72
Setting detail: NUCLEAR MEDICINE
Discharge: HOME | End: 2017-04-19
Attending: INTERNAL MEDICINE | Admitting: INTERNAL MEDICINE
Payer: MEDICARE

## 2017-04-19 DIAGNOSIS — I25.10 CORONARY ARTERY DISEASE INVOLVING NATIVE CORONARY ARTERY OF NATIVE HEART WITHOUT ANGINA PECTORIS: ICD-10-CM

## 2017-04-19 DIAGNOSIS — I10 ESSENTIAL HYPERTENSION WITH GOAL BLOOD PRESSURE LESS THAN 140/90: ICD-10-CM

## 2017-04-19 DIAGNOSIS — I10 HYPERTENSION GOAL BP (BLOOD PRESSURE) < 140/90: ICD-10-CM

## 2017-04-19 DIAGNOSIS — I25.119 CORONARY ARTERY DISEASE INVOLVING NATIVE HEART WITH ANGINA PECTORIS, UNSPECIFIED VESSEL OR LESION TYPE (H): ICD-10-CM

## 2017-04-19 DIAGNOSIS — E78.5 HYPERLIPIDEMIA WITH TARGET LDL LESS THAN 70: ICD-10-CM

## 2017-04-19 LAB
ALT SERPL W P-5'-P-CCNC: 27 U/L (ref 0–70)
ANION GAP SERPL CALCULATED.3IONS-SCNC: 8 MMOL/L (ref 3–14)
BUN SERPL-MCNC: 11 MG/DL (ref 7–30)
CALCIUM SERPL-MCNC: 8.8 MG/DL (ref 8.5–10.1)
CHLORIDE SERPL-SCNC: 104 MMOL/L (ref 94–109)
CHOLEST SERPL-MCNC: 152 MG/DL
CO2 SERPL-SCNC: 27 MMOL/L (ref 20–32)
CREAT SERPL-MCNC: 0.81 MG/DL (ref 0.66–1.25)
GFR SERPL CREATININE-BSD FRML MDRD: ABNORMAL ML/MIN/1.7M2
GLUCOSE SERPL-MCNC: 103 MG/DL (ref 70–99)
HDLC SERPL-MCNC: 51 MG/DL
LDLC SERPL CALC-MCNC: 77 MG/DL
NONHDLC SERPL-MCNC: 101 MG/DL
POTASSIUM SERPL-SCNC: 3.5 MMOL/L (ref 3.4–5.3)
SODIUM SERPL-SCNC: 139 MMOL/L (ref 133–144)
TRIGL SERPL-MCNC: 119 MG/DL

## 2017-04-19 PROCEDURE — 78452 HT MUSCLE IMAGE SPECT MULT: CPT | Mod: 26 | Performed by: INTERNAL MEDICINE

## 2017-04-19 PROCEDURE — A9502 TC99M TETROFOSMIN: HCPCS | Performed by: FAMILY MEDICINE

## 2017-04-19 PROCEDURE — 80048 BASIC METABOLIC PNL TOTAL CA: CPT | Performed by: INTERNAL MEDICINE

## 2017-04-19 PROCEDURE — 78452 HT MUSCLE IMAGE SPECT MULT: CPT

## 2017-04-19 PROCEDURE — 25000128 H RX IP 250 OP 636: Performed by: INTERNAL MEDICINE

## 2017-04-19 PROCEDURE — 36415 COLL VENOUS BLD VENIPUNCTURE: CPT | Performed by: INTERNAL MEDICINE

## 2017-04-19 PROCEDURE — 93016 CV STRESS TEST SUPVJ ONLY: CPT | Performed by: FAMILY MEDICINE

## 2017-04-19 PROCEDURE — 93017 CV STRESS TEST TRACING ONLY: CPT

## 2017-04-19 PROCEDURE — 34300033 ZZH RX 343: Performed by: FAMILY MEDICINE

## 2017-04-19 PROCEDURE — 80061 LIPID PANEL: CPT | Performed by: INTERNAL MEDICINE

## 2017-04-19 PROCEDURE — 84460 ALANINE AMINO (ALT) (SGPT): CPT | Performed by: INTERNAL MEDICINE

## 2017-04-19 PROCEDURE — 93018 CV STRESS TEST I&R ONLY: CPT | Performed by: INTERNAL MEDICINE

## 2017-04-19 RX ORDER — REGADENOSON 0.08 MG/ML
0.4 INJECTION, SOLUTION INTRAVENOUS ONCE
Status: COMPLETED | OUTPATIENT
Start: 2017-04-19 | End: 2017-04-19

## 2017-04-19 RX ADMIN — REGADENOSON 0.4 MG: 0.08 INJECTION, SOLUTION INTRAVENOUS at 09:42

## 2017-04-19 RX ADMIN — TETROFOSMIN 33.1 MCI.: 0.23 INJECTION, POWDER, LYOPHILIZED, FOR SOLUTION INTRAVENOUS at 09:43

## 2017-04-19 RX ADMIN — TETROFOSMIN 11.2 MCI.: 0.23 INJECTION, POWDER, LYOPHILIZED, FOR SOLUTION INTRAVENOUS at 08:00

## 2017-04-20 ENCOUNTER — HOSPITAL ENCOUNTER (OUTPATIENT)
Dept: PHYSICAL THERAPY | Facility: CLINIC | Age: 72
Setting detail: THERAPIES SERIES
End: 2017-04-20
Attending: FAMILY MEDICINE
Payer: MEDICARE

## 2017-04-20 PROCEDURE — 97110 THERAPEUTIC EXERCISES: CPT | Mod: GP

## 2017-04-20 PROCEDURE — 40000718 ZZHC STATISTIC PT DEPARTMENT ORTHO VISIT

## 2017-04-21 ENCOUNTER — OFFICE VISIT (OUTPATIENT)
Dept: OTOLARYNGOLOGY | Facility: CLINIC | Age: 72
End: 2017-04-21

## 2017-04-21 ENCOUNTER — OFFICE VISIT (OUTPATIENT)
Dept: AUDIOLOGY | Facility: CLINIC | Age: 72
End: 2017-04-21

## 2017-04-21 VITALS — WEIGHT: 238 LBS | BODY MASS INDEX: 36.07 KG/M2 | HEIGHT: 68 IN

## 2017-04-21 DIAGNOSIS — H90.3 SENSORINEURAL HEARING LOSS, BILATERAL: Primary | ICD-10-CM

## 2017-04-21 DIAGNOSIS — Z48.89 COCHLEAR IMPLANT FOLLOW-UP: ICD-10-CM

## 2017-04-21 DIAGNOSIS — Z96.21 COCHLEAR IMPLANT FOLLOW-UP: ICD-10-CM

## 2017-04-21 ASSESSMENT — PAIN SCALES - GENERAL: PAINLEVEL: NO PAIN (0)

## 2017-04-21 NOTE — PROGRESS NOTES
AUDIOLOGY REPORT    BACKGROUND INFORMATION: Dr. Mickey Turpin implanted Cas Noriega with a right  [employer] HiRes 90k Advantage midscala MS cochlear implant (CI) on 12/17/15 and a left HiRes 90k Ultra CI on 3/27/17 due to bilateral severe to profound sensorineural hearing loss and lack of benefit from hearing aids.  The patient is being seen for initial programming of his left CI on 4/21/17 in Audiology at the [facility]    PATIENT REPORT: Patient reported that he has healed well since surgery. He reported no new otologic symptoms with the exception of water sounding very loud when it hits the left side of his head in the shower. He met with Dr. Turpin prior to our appointment today and was given medical clearance for activation.       FITTING SESSION: Dr. Mickey Turpin, cochlear implant surgeon, ordered today's appointment. The patient came to the clinic for adjustment to the programs in the external speech processor and for assessment of the external components of the cochlear implant system. These components provide power and data to the internal device. Sound is only heard once the external portion is activated. Postoperative treatment, including device fitting and adjustment, audiologic assessments, and training are required at regular intervals. Testing can include electropsychophysical measures of threshold, comfort, and loudness balancing, which are completed to update the program.    Processor type: Lydia CI Q90  Headpiece type: UHP  Magnet strength: Right: 3, Left: 3.5    TEST RESULTS:   Unaided thresholds: Severe to profound sensorineural hearing loss left ear and profound sensorineural hearing loss right ear. 15-20 dB decrease in air conduction thresholds 250- 1 kHz re: audio 11/11/16.  Electrode Impedances: Left: Electrode 7 open.  Neural Response Testing: Attempted, however no responses were obtained today due to patient discomfort with  volume.  Facial Stimulation: Absent  Tinnitus: Present  Balance Problems: Absent  Pain/Discomfort: Absent  Strategies Tried: Optima P, Optima S  Strategy Preference: Undetermined    Programs:  1. #23: [place] P  2. #24: Optima S    Number of Channels per Program: 15 (Electrode 7 open therefore disabled)    COMMENTS:  M levels were measured using loudness scaling with speech bursts and tone bursts. Patient was able to complete task successfully. I globally decreased M levels prior to going live. In live voice he reported the volume was very soft. I globally increased M levels until he reported comfort. He reported that he was understanding my voice and it first sounded high pitched but then began to sound more natural. Volume was balanced between ears.    We reviewed all equipment including assembly, battery changing and charging and moisture control.    SUMMARY AND RECOMMENDATIONS: Cas was seen for initial left cochlear implant programming today and. He will return in 1 week or sooner if concerns arise. Please call this clinic with questions regarding these results or recommendations.    Peña Childress  Licensed Audiologist  [place] License #[id number]

## 2017-04-21 NOTE — MR AVS SNAPSHOT
After Visit Summary   4/21/2017    Cas Noriega    MRN: [id number]           Patient Information     Date Of Birth          1945        Visit Information        Provider Department      4/21/2017 11:00 AM Mickey Turpin MD [facility]        Today's Diagnoses     Sensorineural hearing loss, bilateral    -  1    Cochlear implant follow-up          Care Instructions       Please call our clinic for any questions,concerns,or worsening symptoms.      Clinic #[phone]       Option 3  for the triage nurse.        Follow-ups after your visit        Your next 10 appointments already scheduled     Apr 26, 2017  2:30 PM CDT   Return Visit with Donny Hua MD   [facility] ([facility])    [address] [facility] [address]   [phone]            Apr 27, 2017 10:00 AM CDT   Treatment with Carlton Graves PT   [facility] ([facility])    [address] [place] [id number]   [phone]            Apr 27, 2017  2:30 PM CDT   Cochlear Implant Return with Vladislav Noble [facility] ([facility])    [address] [place] [id number] [phone]            May 04, 2017 10:00 AM CDT   Treatment with Carlton Graves PT   [facility] ([facility])    [address] [place] [address]   [phone]            May 09, 2017  1:00 PM CDT   Cochlear Implant Return with Vladislav Noble [facility] ([facility])    [address] [place] [address] [id number] [phone]            May 09, 2017  2:00 PM CDT   (Arrive by 1:45 PM)   Evaluation with GAURANG Tomlinson [facility] ([facility])    [address] [place] [address] [id number] [phone]             May 23, 2017  2:30 PM CDT   Cochlear Implant [facility] ([facility])    [address] [id number] [phone]            Jun 28, 2017  1:00 PM CDT   Cochlear Implant [facility] with [facility] ([facility])    [address] [id number] [phone]            Jul 19, 2017  1:00 PM CDT   Cochlear Implant Return with [facility]isl[facility] ([facility])    [address] [id number] [phone]            Oct 18, 2017  1:00 PM CDT   Cochlear Implant Return with [facility] ([facility])    [address] [phone] [phone]              Who to contact     Please call your clinic at [phone] to:    Ask questions about your health    Make or cancel appointments    Discuss your medicines    Learn about your test results    Speak to your doctor   If you have compliments or concerns about an experience at your clinic, or if you wish to file a complaint, please contact [facility] Patient Relations at [phone] or email us at [email or web address]         Additional Information About Your Visit        Glider Information     Glider gives you secure access to your electronic health record. If you see a primary care provider, you can also send messages to your care team and make appointments. If you have questions, please call your primary care clinic.  If you do not have a primary care provider, please call [phone] and they will assist you.      Glider is an electronic gateway that provides easy, online access to your medical records. With Glider, you can request a clinic appointment, read your test results, renew a  "prescription or communicate with your care team.     To access your existing account, please contact your [facility] or call [phone] for assistance.        Care EveryWhere ID     This is your Care EveryWhere ID. This could be used by other organizations to access your [facility] medical records  [id number]        Your Vitals Were     Height BMI (Body Mass Index)                1.73 m (5' 8.11\") 36.07 kg/m2           Blood Pressure from Last 3 Encounters:   04/07/17 125/71   04/04/17 145/75   03/27/17 131/75    Weight from Last 3 Encounters:   04/21/17 108 kg (238 lb)   04/07/17 105.7 kg (233 lb)   04/04/17 107 kg (235 lb 12.8 oz)              Today, you had the following     No orders found for display       Primary Care Provider Office Phone # Fax #    Xu Wells PA-C [phone]       [address] [address]        Thank you!     Thank you for choosing [facility]  for your care. Our goal is always to provide you with excellent care. Hearing back from our patients is one way we can continue to improve our services. Please take a few minutes to complete the written survey that you may receive in the mail after your visit with us. Thank you!             Your Updated Medication List - Protect others around you: Learn how to safely use, store and throw away your medicines at www.disposemymeds.org.          This list is accurate as of: 4/21/17 11:59 PM.  Always use your most recent med list.                   Brand Name Dispense Instructions for use    ACETAMINOPHEN PO      Take 500 mg by mouth 2 every 6 hours, morning, noon and night. As Needed for shoulder.       albuterol 108 (90 BASE) MCG/ACT Inhaler    PROAIR HFA/PROVENTIL HFA/VENTOLIN HFA    1 Inhaler    Inhale 2 puffs into the lungs every 6 hours as needed for shortness of breath / dyspnea or wheezing       aspirin 325 MG EC tablet      one daily       " atenolol 50 MG tablet    TENORMIN    90 tablet    Take 1 tablet (50 mg) by mouth daily       atorvastatin 80 MG tablet    LIPITOR    90 tablet    Take 1 tablet (80 mg) by mouth daily       carbidopa-levodopa  MG per tablet    SINEMET    180 tablet    Take 2 tablets by mouth daily At bedtime.  Written as 90 day supply.       cefadroxil 500 MG capsule    DURICEF    60 capsule    Take 1 capsule (500 mg) by mouth 2 times daily       DIPHENHYDRAMINE HCL PO      Take by mouth daily Reported on 4/7/2017       fluorouracil 5 % cream    EFUDEX    40 g    Apply topically 2 times daily       fluticasone 50 MCG/ACT spray    FLONASE    48 g    Spray 1-2 sprays into both nostrils daily       GLUCOSAMINE CHONDR 1500 COMPLX PO      glucosamine(1500mg)plus chondroitin(1200mg) takes 1 tablet daily       hydrochlorothiazide 25 MG tablet    HYDRODIURIL    90 tablet    Take 1 tablet (25 mg) by mouth every morning       lactobacillus rhamnosus (GG) capsule     90 capsule    Take 1 capsule by mouth daily       lisinopril 40 MG tablet    PRINIVIL/ZESTRIL    90 tablet    Take 1 tablet (40 mg) by mouth daily       nitroglycerin 0.4 MG sublingual tablet    NITROSTAT    30 tablet    Place 1 tablet (0.4 mg) under the tongue every 5 minutes as needed for chest pain       OMEGA-3 FISH OIL PO      Take by mouth 2 times daily (with meals) 1200mg/360mg       order for DME      Equipment being ordered: CPAP Cas Noriega received a [employer] AirSense 10 CPAP. Pressures were set at Auto 11 - 15 cm H2O.       order for DME     1 Device    one digital automatic Home blood pressure machine ( per insurance)       oxyCODONE 5 MG IR tablet    ROXICODONE    30 tablet    Take 1-2 tablets (5-10 mg) by mouth every 4 hours as needed for pain or other (Moderate to Severe)       senna-docusate 8.6-50 MG per tablet    SENOKOT-S;PERICOLACE    30 tablet    Take 1-2 tablets by mouth 2 times daily Take while on oral narcotics to prevent or treat constipation.        traZODone 100 MG tablet    DESYREL    90 tablet    Take 1 tablet (100 mg) by mouth At Bedtime

## 2017-04-21 NOTE — PATIENT INSTRUCTIONS
Please call our clinic for any questions,concerns,or worsening symptoms.      Clinic #[phone]       Option 3  for the triage nurse.

## 2017-04-21 NOTE — PROGRESS NOTES
We had the pleasure of seeing Mr. Noriega at the [facility] to follow up after his left cochlear implantation.       HISTORY OF PRESENT ILLNESS:  Mr. Noriega is a 71-year-old gentleman with a history of bilateral profound sensorineural hearing loss.  He had undergone a right Advanced Bionic cochlear implant years ago and more recently underwent a left cochlear implant with an Advanced Bionic device on March 27, 2017.       Since the surgery the patient reports that he had done quite well.  He had minimal pain.  He has no significant changes, no dizziness or vertigo.  He does have some chronic sinus issues which he has been having some blood tinged secretions associated with this, but this is gradually improving.  He denies any other difficulties or complications after the surgery.  He is planning to go for his implant activation after his appointment today.        PHYSICAL EXAMINATION:  In general the patient is well appearing.  He is in no acute distress. He is communicating appropriately, often using some lip reading with communication during the visit today.  His face is symmetric at rest.  His function is House-Brackmann I out of XI bilaterally.  His right cochlear implant is in place.  There is no obvious skin breakdown or anything over the  stimulator site.  On the left side his post-auricular incision is healed nicely.  There is no surrounding erythema or edema of the  stimulator site.  His ear canal is clear.  He does have an intact tympanic membrane and some hemotympanum.  His gait is normal.        IMPRESSION AND PLAN:  Mr. Noriega is a 71-year-old gentleman with bilateral profound sensorineural hearing loss.  He is now several weeks post-op after his second side left cochlear implant using Advanced Bionics device.  He has healed nicely from his surgery and has recovered without significant complications.  He is planning for activation today.  He will  return to our clinic as needed or if he has any other questions or concerns.        Zahra Frazier MD    Neurotology Fellow      RAFFI/leslye      I, Mickey Turpin MD, saw this patient with the resident and agree with the resident s findings and plan of care as documented in the resident s note. I personally reviewed the medications, labs and imaging.

## 2017-04-21 NOTE — LETTER
4/21/2017       RE: Cas Noriega  [address]  [place] [facility] [place] [address]     Dear Colleague,    Thank you for referring your patient, Cas Noriega, to the [facility] EAR NOSE AND [facility] at [facility]. Please see a copy of my visit note below.    We had the pleasure of seeing Mr. Noriega at the [facility] to follow up after his left cochlear implantation.       HISTORY OF PRESENT ILLNESS:  Mr. Noriega is a 71-year-old gentleman with a history of bilateral profound sensorineural hearing loss.  He had undergone a right Advanced Bionic cochlear implant years ago and more recently underwent a left cochlear implant with an Advanced Bionic device on March 27, 2017.       Since the surgery the patient reports that he had done quite well.  He had minimal pain.  He has no significant changes, no dizziness or vertigo.  He does have some chronic sinus issues which he has been having some blood tinged secretions associated with this, but this is gradually improving.  He denies any other difficulties or complications after the surgery.  He is planning to go for his implant activation after his appointment today.        PHYSICAL EXAMINATION:  In general the patient is well appearing.  He is in no acute distress. He is communicating appropriately, often using some lip reading with communication during the visit today.  His face is symmetric at rest.  His function is House-Brackmann I out of XI bilaterally.  His right cochlear implant is in place.  There is no obvious skin breakdown or anything over the  stimulator site.  On the left side his post-auricular incision is healed nicely.  There is no surrounding erythema or edema of the  stimulator site.  His ear canal is clear.  He does have an intact tympanic membrane and some hemotympanum.  His gait is normal.        IMPRESSION AND PLAN:  Mr. Noriega is a 71-year-old gentleman  with bilateral profound sensorineural hearing loss.  He is now several weeks post-op after his second side left cochlear implant using Advanced Bionics device.  He has healed nicely from his surgery and has recovered without significant complications.  He is planning for activation today.  He will return to our clinic as needed or if he has any other questions or concerns.        Zahra Frazier MD    Neurotology Fellow      RAFFI/leslye      I, Mickey Turpin MD, saw this patient with the resident and agree with the resident s findings and plan of care as documented in the resident s note. I personally reviewed the medications, labs and imaging.        Again, thank you for allowing me to participate in the care of your patient.      Sincerely,    Mickey Turpin MD

## 2017-04-21 NOTE — MR AVS SNAPSHOT
After Visit Summary   4/21/2017    Cas Noriega    MRN: [id number]           Patient Information     Date Of Birth          1945        Visit Information        Provider Department      4/21/2017 1:00 PM Macrina Barriga [facility]        Today's Diagnoses     Sensorineural hearing loss, bilateral    -  1       Follow-ups after your visit        Your next 10 appointments already scheduled     Apr 26, 2017  2:30 PM CDT   Return Visit with Donny Hua MD   [facility] PHYSICIAN HEART AT [facility] ([facility])    [address] [address]   [phone]            Apr 27, 2017 10:00 AM CDT   Treatment with Carlton Graves PT   [facility] ([facility])    [address] [place] [address]   [phone]            Apr 27, 2017  2:30 PM CDT   Cochlear Implant Return with Vladislav Noble [facility] ([facility])    [address] [place] [id number] [phone]            May 04, 2017 10:00 AM CDT   Treatment with Carlton Graves PT   [facility] ([facility])    [address] [place] [address]   [phone]            May 09, 2017  1:00 PM CDT   Cochlear Implant [facility] ([facility])    [address] [place] [id number] [phone]            May 09, 2017  2:00 PM CDT   (Arrive by 1:45 PM)   [facility]ANG [facility] ([facility])    [address] [place] [id number] [phone]            May 23, 2017  2:30 PM CDT   Cochlear Implant Return with [facility] ([facility])    [address] [place] [address]    [phone]            Jun 28, 2017  1:00 PM CDT   Cochlear Implant [facility] with [facility] ([facility])    [address] [phone] [phone]            Jul 19, 2017  1:00 PM CDT   Cochlear Implant Return with Vladislav Noble [facility] ([facility])    [address] [address] [phone]            Oct 18, 2017  1:00 PM CDT   Cochlear Implant Return with Vladislav Noble [facility] ([facility])    [address] [address] [phone]              Who to contact     Please call your clinic at [phone] to:    Ask questions about your health    Make or cancel appointments    Discuss your medicines    Learn about your test results    Speak to your doctor   If you have compliments or concerns about an experience at your clinic, or if you wish to file a complaint, please contact [facility] Patient Relations at [phone] or email us at [email or web address]         Additional Information About Your Visit        [employer] Information     [employer] gives you secure access to your electronic health record. If you see a primary care provider, you can also send messages to your care team and make appointments. If you have questions, please call your primary care clinic.  If you do not have a primary care provider, please call [phone] and they will assist you.      [employer] is an electronic gateway that provides easy, online access to your medical records. With [employer], you can request a clinic appointment, read your test results, renew a prescription or communicate with your care team.     To access your existing account, please contact your [facility] or call [phone] for assistance.        Care  EveryWhere ID     This is your Care EveryWhere ID. This could be used by other organizations to access your [facility] medical records  [id number]         Blood Pressure from Last 3 Encounters:   04/07/17 125/71   04/04/17 145/75   03/27/17 131/75    Weight from Last 3 Encounters:   04/21/17 108 kg (238 lb)   04/07/17 105.7 kg (233 lb)   04/04/17 107 kg (235 lb 12.8 oz)              We Performed the Following     AUDIOGRAM/TYMPANOGRAM - INTERFACE     Audiometry/Air and Bone   (20347)     LT: Diagnostic Analysis of CI 7 yrs & over, Initial Programming (63104)        Primary Care Provider Office Phone # Fax #    Xu Wells PA-C [phone]       [place] [id number] [place] [address]        Thank you!     Thank you for choosing [facility]UDIOLOG[facility]  for your care. Our goal is always to provide you with excellent care. Hearing back from our patients is one way we can continue to improve our services. Please take a few minutes to complete the written survey that you may receive in the mail after your visit with us. Thank you!             Your Updated Medication List - Protect others around you: Learn how to safely use, store and throw away your medicines at www.disposemymeds.org.          This list is accurate as of: 4/21/17  3:13 PM.  Always use your most recent med list.                   Brand Name Dispense Instructions for use    ACETAMINOPHEN PO      Take 500 mg by mouth 2 every 6 hours, morning, noon and night. As Needed for shoulder.       albuterol 108 (90 BASE) MCG/ACT Inhaler    PROAIR HFA/PROVENTIL HFA/VENTOLIN HFA    1 Inhaler    Inhale 2 puffs into the lungs every 6 hours as needed for shortness of breath / dyspnea or wheezing       aspirin 325 MG EC tablet      one daily       atenolol 50 MG tablet    TENORMIN    90 tablet    Take 1 tablet (50 mg) by mouth daily       atorvastatin 80 MG tablet    LIPITOR    90 tablet    Take 1 tablet (80 mg) by mouth daily        carbidopa-levodopa  MG per tablet    SINEMET    180 tablet    Take 2 tablets by mouth daily At bedtime.  Written as 90 day supply.       cefadroxil 500 MG capsule    DURICEF    60 capsule    Take 1 capsule (500 mg) by mouth 2 times daily       DIPHENHYDRAMINE HCL PO      Take by mouth daily Reported on 4/7/2017       fluorouracil 5 % cream    EFUDEX    40 g    Apply topically 2 times daily       fluticasone 50 MCG/ACT spray    FLONASE    48 g    Spray 1-2 sprays into both nostrils daily       GLUCOSAMINE CHONDR 1500 COMPLX PO      glucosamine(1500mg)plus chondroitin(1200mg) takes 1 tablet daily       hydrochlorothiazide 25 MG tablet    HYDRODIURIL    90 tablet    Take 1 tablet (25 mg) by mouth every morning       lactobacillus rhamnosus (GG) capsule     90 capsule    Take 1 capsule by mouth daily       lisinopril 40 MG tablet    PRINIVIL/ZESTRIL    90 tablet    Take 1 tablet (40 mg) by mouth daily       nitroglycerin 0.4 MG sublingual tablet    NITROSTAT    30 tablet    Place 1 tablet (0.4 mg) under the tongue every 5 minutes as needed for chest pain       OMEGA-3 FISH OIL PO      Take by mouth 2 times daily (with meals) 1200mg/360mg       order for DME      Equipment being ordered: CPAP Cas Noriega received a [employer] AirSense 10 CPAP. Pressures were set at Auto 11 - 15 cm H2O.       order for DME     1 Device    one digital automatic Home blood pressure machine ( per insurance)       oxyCODONE 5 MG IR tablet    ROXICODONE    30 tablet    Take 1-2 tablets (5-10 mg) by mouth every 4 hours as needed for pain or other (Moderate to Severe)       senna-docusate 8.6-50 MG per tablet    SENOKOT-S;PERICOLACE    30 tablet    Take 1-2 tablets by mouth 2 times daily Take while on oral narcotics to prevent or treat constipation.       traZODone 100 MG tablet    DESYREL    90 tablet    Take 1 tablet (100 mg) by mouth At Bedtime

## 2017-04-26 ENCOUNTER — OFFICE VISIT (OUTPATIENT)
Dept: CARDIOLOGY | Facility: CLINIC | Age: 72
End: 2017-04-26
Attending: INTERNAL MEDICINE
Payer: COMMERCIAL

## 2017-04-26 VITALS
SYSTOLIC BLOOD PRESSURE: 135 MMHG | HEART RATE: 58 BPM | WEIGHT: 236 LBS | BODY MASS INDEX: 35.77 KG/M2 | OXYGEN SATURATION: 94 % | DIASTOLIC BLOOD PRESSURE: 73 MMHG

## 2017-04-26 DIAGNOSIS — I25.10 CORONARY ARTERY DISEASE INVOLVING NATIVE CORONARY ARTERY OF NATIVE HEART WITHOUT ANGINA PECTORIS: ICD-10-CM

## 2017-04-26 DIAGNOSIS — E78.5 HYPERLIPIDEMIA WITH TARGET LDL LESS THAN 70: ICD-10-CM

## 2017-04-26 DIAGNOSIS — I10 ESSENTIAL HYPERTENSION WITH GOAL BLOOD PRESSURE LESS THAN 140/90: ICD-10-CM

## 2017-04-26 PROCEDURE — 99214 OFFICE O/P EST MOD 30 MIN: CPT | Performed by: INTERNAL MEDICINE

## 2017-04-26 RX ORDER — GUAIFENESIN 600 MG/1
1200 TABLET, EXTENDED RELEASE ORAL 2 TIMES DAILY
COMMUNITY
End: 2017-11-20

## 2017-04-26 NOTE — PATIENT INSTRUCTIONS
Thank you for your [facility] visit today. Your provider has recommended the following:  Medication Changes:  None today. Continue taking your medications as you have been.  Recommendations:  As discussed at your visit today with Dr. Hua.  Follow-up:  See Dr. Hua for cardiology follow up in 6 months with a fasting lab to be done 1-2 days prior.  We kindly ask that you call cardiology scheduling at [phone] three months prior (End of July 2017) to requested revisit date to schedule future cardiology appointments.  Reminder:  1. Please bring in your current medication list or your medication, over the counter supplements and vitamin bottles as we will review these at each office visit.               [facility] HEART CARE  [facility]~[address]~[place], [place]~[address]  Questions about your visit today?  Call your Cardiology Clinic RN's-Meryl Shay and/or Zee Cosby at [phone].

## 2017-04-26 NOTE — MR AVS SNAPSHOT
After Visit Summary   4/26/2017    Cas Noriega    MRN: [id number]           Patient Information     Date Of Birth          1945        Visit Information        Provider Department      4/26/2017 2:30 PM Donny Hua MD [facility] PHYSICIAN HEART AT [facility]        Today's Diagnoses     Coronary artery disease involving native coronary artery of native heart without angina pectoris        Essential hypertension with goal blood pressure less than 140/90        Hyperlipidemia with target LDL less than 70          Care Instructions    Thank you for your  Heart Care visit today. Your provider has recommended the following:  Medication Changes:  None today. Continue taking your medications as you have been.  Recommendations:  As discussed at your visit today with Dr. Hua.  Follow-up:  See Dr. Hua for cardiology follow up in 6 months with a fasting lab to be done 1-2 days prior.  We kindly ask that you call cardiology scheduling at [phone] three months prior (End of July 2017) to requested revisit date to schedule future cardiology appointments.  Reminder:  1. Please bring in your current medication list or your medication, over the counter supplements and vitamin bottles as we will review these at each office visit.               [facility]~[address]~[place], [place]~[address]  Questions about your visit today?  Call your Cardiology Clinic RN's-Meryl Shay and/or Zee Cosby at [phone].              Follow-ups after your visit        Additional Services     Follow-Up with Cardiologist                 Your next 10 appointments already scheduled     Apr 27, 2017 10:00 AM CDT   Treatment with Carlton Graves PT   [facility] ([facility])    [address] [place] [id number] [phone]            Apr 27, 2017  2:30 PM CDT   Cochlear  Implant [facility] ([facility])    [address] [place] [address]   [phone]            May 04, 2017 10:00 AM CDT   Treatment with Carlton Graves PT   [facility] Therapy ([facility])    [address] [place] [address]   [phone]            May 09, 2017  1:00 PM CDT   Cochlear Implant Return with [facility]islav [facility] ([facility])    [address] [place] [address]   [phone]            May 09, 2017  2:00 PM CDT   (Arrive by 1:45 PM)   Evaluation with GAURANG Tomlinson [facility] Audiology ([facility])    [address] [place] [address]   [phone]            May 23, 2017  2:30 PM CDT   Cochlear Implant Return with Vladislav Noble [facility] ([facility])    [address] [place] [address]   [phone]            Jun 28, 2017  1:00 PM CDT   Cochlear Implant Return with Vladislav Noble [facility]iology ([facility])    [address] [place] [address]   [phone]            Jul 19, 2017  1:00 PM CDT   Cochlear Implant Return with Vladislav Noble [facility] ([facility])    [address] [place] [address]   [phone]            Oct 18, 2017  1:00 PM CDT   Cochlear Implant Return with Vladislav Noble [facility]iology ([facility])    [address] [place] [address]   [phone]              Future tests that were ordered for you today     Open Future Orders        Priority Expected Expires Ordered    Basic metabolic panel Routine 10/23/2017 4/26/2018 4/26/2017    Lipid Profile Routine  10/23/2017 4/26/2018 4/26/2017    ALT Routine 10/23/2017 4/26/2018 4/26/2017    Follow-Up with Cardiologist Routine 10/23/2017 4/26/2018 4/26/2017            Who to contact     If you have questions or need follow up information about today's clinic visit or your schedule please contact [facility] PHYSICIAN HEART AT [facility] directly at [phone].  Normal or non-critical lab and imaging results will be communicated to you by [employer], letter or phone within 4 business days after the clinic has received the results. If you do not hear from us within 7 days, please contact the clinic through [employer] or phone. If you have a critical or abnormal lab result, we will notify you by phone as soon as possible.  Submit refill requests through [employer] or call your pharmacy and they will forward the refill request to us. Please allow 3 business days for your refill to be completed.          Additional Information About Your Visit        [employer] Information     [employer] gives you secure access to your electronic health record. If you see a primary care provider, you can also send messages to your care team and make appointments. If you have questions, please call your primary care clinic.  If you do not have a primary care provider, please call [phone] and they will assist you.        Care EveryWhere ID     This is your Care EveryWhere ID. This could be used by other organizations to access your [facility] medical records  [id number]        Your Vitals Were     Pulse Pulse Oximetry BMI (Body Mass Index)             58 94% 35.77 kg/m2          Blood Pressure from Last 3 Encounters:   04/26/17 135/73   04/07/17 125/71   04/04/17 145/75    Weight from Last 3 Encounters:   04/26/17 107 kg (236 lb)   04/21/17 108 kg (238 lb)   04/07/17 105.7 kg (233 lb)              We Performed the Following     Follow-Up with Cardiologist          Today's Medication Changes          These changes are accurate as of: 4/26/17   2:44 PM.  If you have any questions, ask your nurse or doctor.               Stop taking these medicines if you haven't already. Please contact your care team if you have questions.     DIPHENHYDRAMINE HCL PO   Stopped by:  Donny Hua MD           fluticasone 50 MCG/ACT spray   Commonly known as:  FLONASE   Stopped by:  Donny Hua MD           oxyCODONE 5 MG IR tablet   Commonly known as:  ROXICODONE   Stopped by:  Donny Hua MD           senna-docusate 8.6-50 MG per tablet   Commonly known as:  SENOKOT-S;PERICOLACE   Stopped by:  Donny Hua MD                    Primary Care Provider Office Phone # Fax #    Xu Wells PA-C [phone]       [address] [address]        Thank you!     Thank you for choosing [facility] PHYSICIAN HEART AT [facility]  for your care. Our goal is always to provide you with excellent care. Hearing back from our patients is one way we can continue to improve our services. Please take a few minutes to complete the written survey that you may receive in the mail after your visit with us. Thank you!             Your Updated Medication List - Protect others around you: Learn how to safely use, store and throw away your medicines at www.disposemymeds.org.          This list is accurate as of: 4/26/17  2:44 PM.  Always use your most recent med list.                   Brand Name Dispense Instructions for use    ACETAMINOPHEN PO      Take 500 mg by mouth 2 every 6 hours, morning, noon and night. As Needed for shoulder.       albuterol 108 (90 BASE) MCG/ACT Inhaler    PROAIR HFA/PROVENTIL HFA/VENTOLIN HFA    1 Inhaler    Inhale 2 puffs into the lungs every 6 hours as needed for shortness of breath / dyspnea or wheezing       aspirin 325 MG EC tablet      one daily       atenolol 50 MG tablet    TENORMIN    90 tablet    Take 1 tablet (50 mg) by mouth daily        atorvastatin 80 MG tablet    LIPITOR    90 tablet    Take 1 tablet (80 mg) by mouth daily       carbidopa-levodopa  MG per tablet    SINEMET    180 tablet    Take 2 tablets by mouth daily At bedtime.  Written as 90 day supply.       cefadroxil 500 MG capsule    DURICEF    60 capsule    Take 1 capsule (500 mg) by mouth 2 times daily       fluorouracil 5 % cream    EFUDEX    40 g    Apply topically 2 times daily       GLUCOSAMINE CHONDR 1500 COMPLX PO      glucosamine(1500mg)plus chondroitin(1200mg) takes 1 tablet daily       hydrochlorothiazide 25 MG tablet    HYDRODIURIL    90 tablet    Take 1 tablet (25 mg) by mouth every morning       lactobacillus rhamnosus (GG) capsule     90 capsule    Take 1 capsule by mouth daily       lisinopril 40 MG tablet    PRINIVIL/ZESTRIL    90 tablet    Take 1 tablet (40 mg) by mouth daily       MUCINEX 600 MG 12 hr tablet   Generic drug:  guaiFENesin      Take 1,200 mg by mouth 2 times daily       nitroglycerin 0.4 MG sublingual tablet    NITROSTAT    30 tablet    Place 1 tablet (0.4 mg) under the tongue every 5 minutes as needed for chest pain       OMEGA-3 FISH OIL PO      Take by mouth 2 times daily (with meals) 1200mg/360mg       order for DME      Equipment being ordered: CPAP Cas Noriega received a [employer] AirSense 10 CPAP. Pressures were set at Auto 11 - 15 cm H2O.       order for DME     1 Device    one digital automatic Home blood pressure machine ( per insurance)       traZODone 100 MG tablet    DESYREL    90 tablet    Take 1 tablet (100 mg) by mouth At Bedtime

## 2017-04-26 NOTE — LETTER
4/26/2017    Xu Wells PA-C  [facility]   [address] [place] [id number]    RE: Cas Noriega       Dear Colleague,    I had the pleasure of seeing Cardozo T Hari in the [facility].    The patient is a 71-year-old, overweight white male with a history of ischemic heart disease dating back to 2007 when he had an inferior wall myocardial infarction treated emergently at [facility] with 2 stents placed.  He subsequently was scheduled for bypass surgery of multivessel disease.  He underwent that procedure without difficulty, although there are minimal records available at this time.  He had a previous cardiac history of hyperlipidemia, hypertension, sleep apnea and previous history of cigarette smoking with restless legs syndrome.  He has been able to participate in most activities without significant restriction.  He has had a significant decrease in hearing and now has new hearing aids using bone transition for sound on both left and right side.  He denies peg chest discomfort, shortness of breath, dizziness, palpitations, nausea, vomiting, diaphoresis or syncope.  He denies PND, orthopnea, fevers, chills or sweats.  Lexiscan demonstrated mid to basilar inferior/inferolateral scar with no evidence of ischemia.  Gating demonstrated a normal-sized left ventricle with mild basilar inferior wall hypokinesis.  Left ventricular ejection fraction was low normal at 51% and no change from 2015.  His echocardiogram from last year demonstrated a normal-sized left ventricle with ejection fraction 60%-65%, normal right ventricular systolic function, no significant valvular insufficiency or stenosis and no change from 2014.  The patient participates in activities without significant restriction.  He has not noted significant chest discomfort, shortness of breath, dizziness or palpitations.      MEDICATIONS:   1.  Mucinex 1200 mg twice a day.      2.  Duricef 500 mg twice a day.   3.  Atenolol 50 mg a day.   4.  Atorvastatin 80 mg a day.   5.  Carbidopa/levodopa 25/100 mg 2 tablets at bedtime.   6.  Hydrochlorothiazide 25 mg a day.   7.  Lisinopril 40 mg a day.   8.  Trazodone 100 mg at bedtime.   9.  Nitroglycerin 0.4 mg sublingual p.r.n.   10.  Albuterol inhaler as needed.   11.  Acetaminophen 1000 mg every 6 hours as needed.   12.  Omega 3 fatty acids 2 g a day.   13.  Aspirin 325 mg a day.   14.  Glucosamine chondroitin 1500 mg a day.      LABORATORY DATA:  Cholesterol 52, HDL 51, LDL 77, triglycerides 119, sodium 139, potassium 3.5, BUN 11, creatinine 0.81, ALT 27.      The patient presents to Cardiology Clinic for followup of his ischemic heart disease.  He overall appears to be doing well and participating in activity without significant restriction.      PHYSICAL EXAMINATION:   VITAL SIGNS:  Blood pressure is 135/73 with a heart rate of 58-60 and regular.  Weight is 236 pounds, which is stable.   NECK:  Without jugular venous distention, carotid bruit or palpable thyroid.   CHEST:  Essentially clear to percussion and auscultation with slight decreased breath sounds at the bases, slightly prolonged expiratory phase and isolated basilar crackles.   CARDIAC:  Regular rhythm, soft S4 gallop, a 1-2/6 systolic murmur at the left sternal border radiating towards the apex.  No diastolic murmur, rub or S3.   EXTREMITIES:  Without cyanosis or edema.      CLINICAL IMPRESSION:   1.  Stable cardiac condition.   2.  Ischemic heart disease, status post inferior wall myocardial infarction  PTCI and stent in the right coronary artery with subsequent bypass graft surgery for multiple vessels in 2008.   3.  Distant history of cigarette smoking.   4.  History of hypertension, well controlled.   5.  History of hyperlipidemia, not quite at goal due to dietary indiscretion.   6.  Probable sleep apnea, not on CPAP.   7.  History of peripheral vascular disease.   8.   History of anxiety and depression.   9.  History of cataracts.   10.  History of osteoarthritis.   11.  Distant history of hearing loss, treated through bone conduction.      DISCUSSION:  The patient has done well clinically with regard to his cardiac status.  He has had no significant symptoms of angina pectoris or congestive heart failure.  He will need continued close followup of his serum lipids, basic metabolic panel and blood pressure.  He has no evidence of ischemia by his Cardiolite stress test.  He will have an echocardiogram early next year to follow left ventricular function.  Otherwise, he appears to be stable and satisfied with his lifestyle.      RECOMMENDATIONS:   1.  Continue present medications.   2.  Close followup of serum lipids, basic metabolic panel and blood pressure.   3.  Diet and exercise program as tolerated.   4.  Orthopedic and hearing followup.   5.  Possible sleep evaluation.   6.  Echocardiography in early 2018 to follow left ventricular function.   7.  Routine medical followup.   8.  Cardiology followup in 6 months.     Again, thank you for allowing me to participate in the care of your patient.      Sincerely,    Donny Hua MD     [facility]

## 2017-04-27 ENCOUNTER — HOSPITAL ENCOUNTER (OUTPATIENT)
Dept: PHYSICAL THERAPY | Facility: CLINIC | Age: 72
Setting detail: THERAPIES SERIES
End: 2017-04-27
Attending: FAMILY MEDICINE
Payer: MEDICARE

## 2017-04-27 ENCOUNTER — OFFICE VISIT (OUTPATIENT)
Dept: AUDIOLOGY | Facility: CLINIC | Age: 72
End: 2017-04-27

## 2017-04-27 DIAGNOSIS — H90.3 SENSORINEURAL HEARING LOSS, BILATERAL: Primary | ICD-10-CM

## 2017-04-27 PROCEDURE — 40000718 ZZHC STATISTIC PT DEPARTMENT ORTHO VISIT

## 2017-04-27 PROCEDURE — 97110 THERAPEUTIC EXERCISES: CPT | Mod: GP

## 2017-04-27 NOTE — MR AVS SNAPSHOT
After Visit Summary   4/27/2017    Cas Noriega    MRN: [id number]           Patient Information     Date Of Birth          1945        Visit Information        Provider Department      4/27/2017 2:30 PM Macrina Barriga AuD [facility]        Today's Diagnoses     Sensorineural hearing loss, bilateral    -  1       Follow-ups after your visit        Your next 10 appointments already scheduled     May 04, 2017 10:00 AM CDT   Treatment with Carlton Graves PT   [facility] ([facility])    [address] [place] [id number]   [phone]            May 09, 2017  1:00 PM CDT   Cochlear Implant Return with Vladislav Noble [facility] ([facility])    [address] [place] [address]   [phone]            May 09, 2017  2:00 PM CDT   (Arrive by 1:45 PM)   Evaluation with GAURANG Tomlinson [facility] ([facility])    [address] [place] [id number] [phone]            May 11, 2017 10:00 AM CDT   Treatment with Carlton Graves PT   [facility] ([facility])    [address] [place] [phone]   [phone]            May 23, 2017  2:30 PM CDT   Cochlear Implant [facility] Vladislav [facility] ([facility])    [address] [place] [id number] [phone]            Jun 28, 2017  1:00 PM CDT   Cochlear Implant [facility] with Vladislav [facility] ([facility])    [address] [place] [address]   [phone]            Jul 19, 2017  1:00 PM CDT   Cochlear Implant Return with Vladislav [facility] ([facility])    [address] [place] [address]    [phone]            Oct 18, 2017  1:00 PM CDT   Cochlear Implant Return with Vladislav Noble   [facility] ([facility])    [address] [address] [phone]              Future tests that were ordered for you today     Open Future Orders        Priority Expected Expires Ordered    Basic metabolic panel Routine 10/23/2017 4/26/2018 4/26/2017    Lipid Profile Routine 10/23/2017 4/26/2018 4/26/2017    ALT Routine 10/23/2017 4/26/2018 4/26/2017    Follow-Up with Cardiologist Routine 10/23/2017 4/26/2018 4/26/2017            Who to contact     Please call your clinic at [phone] to:    Ask questions about your health    Make or cancel appointments    Discuss your medicines    Learn about your test results    Speak to your doctor   If you have compliments or concerns about an experience at your clinic, or if you wish to file a complaint, please contact [facility] Patient Relations at [phone] or email us at [email or web address]         Additional Information About Your Visit        [employer] Information     [employer] gives you secure access to your electronic health record. If you see a primary care provider, you can also send messages to your care team and make appointments. If you have questions, please call your primary care clinic.  If you do not have a primary care provider, please call [phone] and they will assist you.      [employer] is an electronic gateway that provides easy, online access to your medical records. With [employer], you can request a clinic appointment, read your test results, renew a prescription or communicate with your care team.     To access your existing account, please contact your [facility] or call [phone] for assistance.        Care EveryWhere ID     This is your Care EveryWhere ID. This could be used by other organizations to  access your [place] medical records  [id number]         Blood Pressure from Last 3 Encounters:   04/26/17 135/73   04/07/17 125/71   04/04/17 145/75    Weight from Last 3 Encounters:   04/26/17 107 kg (236 lb)   04/21/17 108 kg (238 lb)   04/07/17 105.7 kg (233 lb)              We Performed the Following     LT: Diagnostic Analysis of CI 7 yrs & over, Subsequent Programming   ([id number])        Primary Care Provider Office Phone # Fax #    Xu Wells PA-C [phone]       [facility] [address] [address]        Thank you!     Thank you for choosing [facility]  for your care. Our goal is always to provide you with excellent care. Hearing back from our patients is one way we can continue to improve our services. Please take a few minutes to complete the written survey that you may receive in the mail after your visit with us. Thank you!             Your Updated Medication List - Protect others around you: Learn how to safely use, store and throw away your medicines at www.disposemymeds.org.          This list is accurate as of: 4/27/17  3:19 PM.  Always use your most recent med list.                   Brand Name Dispense Instructions for use    ACETAMINOPHEN PO      Take 500 mg by mouth 2 every 6 hours, morning, noon and night. As Needed for shoulder.       albuterol 108 (90 BASE) MCG/ACT Inhaler    PROAIR HFA/PROVENTIL HFA/VENTOLIN HFA    1 Inhaler    Inhale 2 puffs into the lungs every 6 hours as needed for shortness of breath / dyspnea or wheezing       aspirin 325 MG EC tablet      one daily       atenolol 50 MG tablet    TENORMIN    90 tablet    Take 1 tablet (50 mg) by mouth daily       atorvastatin 80 MG tablet    LIPITOR    90 tablet    Take 1 tablet (80 mg) by mouth daily       carbidopa-levodopa  MG per tablet    SINEMET    180 tablet    Take 2 tablets by mouth daily At bedtime.  Written as 90 day supply.       cefadroxil 500 MG capsule    DURICEF     60 capsule    Take 1 capsule (500 mg) by mouth 2 times daily       fluorouracil 5 % cream    EFUDEX    40 g    Apply topically 2 times daily       GLUCOSAMINE CHONDR 1500 COMPLX PO      glucosamine(1500mg)plus chondroitin(1200mg) takes 1 tablet daily       hydrochlorothiazide 25 MG tablet    HYDRODIURIL    90 tablet    Take 1 tablet (25 mg) by mouth every morning       lactobacillus rhamnosus (GG) capsule     90 capsule    Take 1 capsule by mouth daily       lisinopril 40 MG tablet    PRINIVIL/ZESTRIL    90 tablet    Take 1 tablet (40 mg) by mouth daily       MUCINEX 600 MG 12 hr tablet   Generic drug:  guaiFENesin      Take 1,200 mg by mouth 2 times daily       nitroglycerin 0.4 MG sublingual tablet    NITROSTAT    30 tablet    Place 1 tablet (0.4 mg) under the tongue every 5 minutes as needed for chest pain       OMEGA-3 FISH OIL PO      Take by mouth 2 times daily (with meals) 1200mg/360mg       order for DME      Equipment being ordered: CPAP Cas Noriega received a Active-Semi AirSense 10 CPAP. Pressures were set at Auto 11 - 15 cm H2O.       order for DME     1 Device    one digital automatic Home blood pressure machine ( per insurance)       traZODone 100 MG tablet    DESYREL    90 tablet    Take 1 tablet (100 mg) by mouth At Bedtime

## 2017-04-27 NOTE — PROGRESS NOTES
HISTORY OF PRESENT ILLNESS:  The patient is a 71-year-old, overweight white male with a history of ischemic heart disease dating back to 2007 when he had an inferior wall myocardial infarction treated emergently at [facility] with 2 stents placed.  He subsequently was scheduled for bypass surgery of multivessel disease.  He underwent that procedure without difficulty, although there are minimal records available at this time.  He had a previous cardiac history of hyperlipidemia, hypertension, sleep apnea and previous history of cigarette smoking with restless legs syndrome.  He has been able to participate in most activities without significant restriction.  He has had a significant decrease in hearing and now has new hearing aids using bone transition for sound on both left and right side.  He denies peg chest discomfort, shortness of breath, dizziness, palpitations, nausea, vomiting, diaphoresis or syncope.  He denies PND, orthopnea, fevers, chills or sweats.  Lexiscan demonstrated mid to basilar inferior/inferolateral scar with no evidence of ischemia.  Gating demonstrated a normal-sized left ventricle with mild basilar inferior wall hypokinesis.  Left ventricular ejection fraction was low normal at 51% and no change from 2015.  His echocardiogram from last year demonstrated a normal-sized left ventricle with ejection fraction 60%-65%, normal right ventricular systolic function, no significant valvular insufficiency or stenosis and no change from 2014.  The patient participates in activities without significant restriction.  He has not noted significant chest discomfort, shortness of breath, dizziness or palpitations.      MEDICATIONS:   1.  Mucinex 1200 mg twice a day.     2.  Duricef 500 mg twice a day.   3.  Atenolol 50 mg a day.   4.  Atorvastatin 80 mg a day.   5.  Carbidopa/levodopa 25/100 mg 2 tablets at bedtime.   6.  Hydrochlorothiazide 25 mg a day.   7.  Lisinopril 40 mg a day.   8.   Trazodone 100 mg at bedtime.   9.  Nitroglycerin 0.4 mg sublingual p.r.n.   10.  Albuterol inhaler as needed.   11.  Acetaminophen 1000 mg every 6 hours as needed.   12.  Omega 3 fatty acids 2 g a day.   13.  Aspirin 325 mg a day.   14.  Glucosamine chondroitin 1500 mg a day.      LABORATORY DATA:  Cholesterol 52, HDL 51, LDL 77, triglycerides 119, sodium 139, potassium 3.5, BUN 11, creatinine 0.81, ALT 27.      The patient presents to Cardiology Clinic for followup of his ischemic heart disease.  He overall appears to be doing well and participating in activity without significant restriction.      PHYSICAL EXAMINATION:   VITAL SIGNS:  Blood pressure is 135/73 with a heart rate of 58-60 and regular.  Weight is 236 pounds, which is stable.   NECK:  Without jugular venous distention, carotid bruit or palpable thyroid.   CHEST:  Essentially clear to percussion and auscultation with slight decreased breath sounds at the bases, slightly prolonged expiratory phase and isolated basilar crackles.   CARDIAC:  Regular rhythm, soft S4 gallop, a 1-2/6 systolic murmur at the left sternal border radiating towards the apex.  No diastolic murmur, rub or S3.   EXTREMITIES:  Without cyanosis or edema.      CLINICAL IMPRESSION:   1.  Stable cardiac condition.   2.  Ischemic heart disease, status post inferior wall myocardial infarction  PTCI and stent in the right coronary artery with subsequent bypass graft surgery for multiple vessels in 2008.   3.  Distant history of cigarette smoking.   4.  History of hypertension, well controlled.   5.  History of hyperlipidemia, not quite at goal due to dietary indiscretion.   6.  Probable sleep apnea, not on CPAP.   7.  History of peripheral vascular disease.   8.  History of anxiety and depression.   9.  History of cataracts.   10.  History of osteoarthritis.   11.  Distant history of hearing loss, treated through bone conduction.      DISCUSSION:  The patient has done well clinically with  regard to his cardiac status.  He has had no significant symptoms of angina pectoris or congestive heart failure.  He will need continued close followup of his serum lipids, basic metabolic panel and blood pressure.  He has no evidence of ischemia by his Cardiolite stress test.  He will have an echocardiogram early next year to follow left ventricular function.  Otherwise, he appears to be stable and satisfied with his lifestyle.      RECOMMENDATIONS:   1.  Continue present medications.   2.  Close followup of serum lipids, basic metabolic panel and blood pressure.   3.  Diet and exercise program as tolerated.   4.  Orthopedic and hearing followup.   5.  Possible sleep evaluation.   6.  Echocardiography in early 2018 to follow left ventricular function.   7.  Routine medical followup.   8.  Cardiology followup in 6 months.      cc:   [place] [address]         DUSTIN ZHENG MD, [facility]             D: 2017 16:06   T: 2017 14:36   MT: jamari      Name:     CHARLES SANCHEZ   MRN:      [id number]        Account:      [id number]   :      1945           Service Date: 2017      Document: [id number]

## 2017-04-27 NOTE — PROGRESS NOTES
AUDIOLOGY REPORT    BACKGROUND INFORMATION: Dr. Mickey Turpin implanted Cas Noriega with a right  Advanced Chargebacks HiRes 90k Advantage midscala MS cochlear implant (CI) on 12/17/15 and a left HiRes 90k Ultra CI on 3/27/17 due to bilateral severe to profound sensorineural hearing loss and lack of benefit from hearing aids.  The patient is being seen for programming of his left CI on 4/27/17 in Audiology at the [facility]    PATIENT REPORT: Patient reported that the processor has been cutting out when there is loud noise or with certain consonants. He also reported that he prefers Program 2 ([place] S).    FITTING SESSION: Dr. Mickey Turpin, cochlear implant surgeon, ordered today's appointment. The patient came to the clinic for adjustment to the programs in the external speech processor and for assessment of the external components of the cochlear implant system. These components provide power and data to the internal device. Sound is only heard once the external portion is activated. Postoperative treatment, including device fitting and adjustment, audiologic assessments, and training are required at regular intervals. Testing can include electropsychophysical measures of threshold, comfort, and loudness balancing, which are completed to update the program.    Processor type: Lydia CI Q90  Headpiece type: UHP  Magnet strength: Right: 3, Left: 4    TEST RESULTS:   Unaided thresholds: Severe to profound sensorineural hearing loss left ear and profound sensorineural hearing loss right ear. 15-20 dB decrease in air conduction thresholds 250- 1 kHz re: audio 11/11/16.  Electrode Impedances: Left: Electrode 7 open.  Neural Response Testing: Attempted, however no responses were obtained today due to patient discomfort with volume.  Facial Stimulation: Absent  Tinnitus: Present  Balance Problems: Absent  Pain/Discomfort: Absent  Strategies Tried: Optima P, Optima  S  Strategy Preference: Undetermined    Programs:  1. #25: Measured M's      Number of Channels per Program: 15 (Electrode 7 open therefore disabled)    COMMENTS:  M levels were measured using loudness scaling with speech bursts and tone bursts. Patient was able to complete task successfully. M levels decreased slightly across the array. In live voice he reported my voice was comfortable but when he put his right processor on he reported the volume was too loud. I decreased M's by 3 and reported comfort.       SUMMARY AND RECOMMENDATIONS: Cas was seen for left cochlear implant programming today and. He will return in 1 week or sooner if concerns arise. Please call this clinic with questions regarding these results or recommendations.    Peña Childress  Licensed Audiologist  [place] License #[id number]

## 2017-05-04 ENCOUNTER — HOSPITAL ENCOUNTER (OUTPATIENT)
Dept: PHYSICAL THERAPY | Facility: CLINIC | Age: 72
Setting detail: THERAPIES SERIES
End: 2017-05-04
Attending: FAMILY MEDICINE
Payer: MEDICARE

## 2017-05-04 PROCEDURE — G8984 CARRY CURRENT STATUS: HCPCS | Mod: GP,CJ

## 2017-05-04 PROCEDURE — 40000718 ZZHC STATISTIC PT DEPARTMENT ORTHO VISIT

## 2017-05-04 PROCEDURE — G8985 CARRY GOAL STATUS: HCPCS | Mod: GP,CJ

## 2017-05-04 PROCEDURE — 97110 THERAPEUTIC EXERCISES: CPT | Mod: GP

## 2017-05-04 NOTE — PROGRESS NOTES
Outpatient Physical Therapy Progress Note     Patient: Cas Noriega  : 1945    Beginning/End Dates of Reporting Period:  3/16/17 to 2017    Referring Provider: Bryce Leigh MD     Therapy Diagnosis: Chronic R shoulder pain (R reverse TSA)     Client Self Report: Pt notes increased light headed eppisodes lasting a few sec today about 6x this am. Overall function has improved and less pain in R shoulder. Still can't reach top of head.    Objective Measurements:  Objective Measure: R shoulder AROM  Details: flex=112(bent elbow), abd=80(elbow bent), at 0* abd: ER=55, IR=to stomach  Objective Measure: R shoulder PROM  Details: flex=141, abd=103, at 90* abd: ER=32(pain), IR=35  Objective Measure: Vitals  Details: BP: 138/68, HR: 52  Objective Measure: MMT at 0* abd  Details: flex=4, abd=4-, ER=3(pain), IR=4-  Objective Measure: SPADI  Details: 14% (39% at eval)            Outcome Measures (most recent score):  See SPADI above    Goals:  Goal Identifier don deodorant   Goal Description Following PT interventions, pt will increase R shoulder abd AROM to 90* to be able to don deodorant with less difficulty   Target Date 2017   Date Met   (lacking 2*)   Progress:     Goal Identifier Reach high shelf   Goal Description Following PT interventions, pt will increase R shoulder flex AROM to 100* to be able to reach high shelf with less difficulty   Target Date 17   Date Met  17   Progress:     Goal Identifier push   Goal Description Following PT interventions, pt will increase R shoulder ER MMT at 0* abd to 4/5 grade to be able to push with less pain   Target Date 17   Date Met   (increased strength)   Progress:     Goal Identifier wash head   Goal Description Following PT interventions, pt will score 20% on SPADI for MCID and less pain with washing head when using R shoulder   Target Date 17   Date Met  17   Progress:     Goal Identifier     Goal Description      Target Date     Date Met      Progress:     Goal Identifier     Goal Description     Target Date     Date Met      Progress:     Goal Identifier     Goal Description     Target Date     Date Met      Progress:     Goal Identifier     Goal Description     Target Date     Date Met      Progress:     Progress Toward Goals:   Progress this reporting period: Pt has met 50% of goals, improved ROM, strength and function in R shoulder. Pt is limited by AROM up to PROM amounts, strength, pain with sleeping. Pt is appropriate for continuing skilled PT to address remaining limitations and progress towards goals.          Plan:  Continue therapy per current plan of care.    RECERTIFICATION    Cas RAIMUNDO Hari  1945    Session Number: 10/10 /[facility] since start of care.    Reasons for Continuing Treatment:   See above    Frequency/Duration  1 times per week for 8 weeks for a total of 18 visits.    Recertification Period  5/4/17 - 06/29/17    Physician Signature:    Date:    X_______________________________________________________    Physician Name: Bryce Leigh MD     I certify the need for these services furnished under this plan of treatment and while under my care. Physician co-signature of this document indicates review and certification of the therapy plan.  This signature may be written on paper, or electronically signed within EPIC.       Thank You,    Carlton Graves, PT, DPT  Doctor of Physical Therapy  [facility]  [phone]

## 2017-05-05 DIAGNOSIS — H90.3 SENSORINEURAL HEARING LOSS (SNHL), BILATERAL: Primary | ICD-10-CM

## 2017-05-09 ENCOUNTER — OFFICE VISIT (OUTPATIENT)
Dept: AUDIOLOGY | Facility: CLINIC | Age: 72
End: 2017-05-09

## 2017-05-09 ENCOUNTER — THERAPY VISIT (OUTPATIENT)
Dept: AUDIOLOGY | Facility: CLINIC | Age: 72
End: 2017-05-09

## 2017-05-09 DIAGNOSIS — H90.3 SENSORINEURAL HEARING LOSS, BILATERAL: Primary | ICD-10-CM

## 2017-05-09 DIAGNOSIS — H90.3 SENSORY HEARING LOSS, BILATERAL: Primary | ICD-10-CM

## 2017-05-09 NOTE — MR AVS SNAPSHOT
After Visit Summary   5/9/2017    Cas Noriega    MRN: [id number]           Patient Information     Date Of Birth          1945        Visit Information        Provider Department      5/9/2017 1:00 PM Macrina Barriga [facility]D [facility]        Today's Diagnoses     Sensorineural hearing loss, bilateral    -  1       Follow-ups after your visit        Your next 10 appointments already scheduled     May 11, 2017 10:00 AM CDT   Ortho Treatment with Carlton Graves, PT   [facility] Physical Therapy ([facility])    [address] [place] [address]   [phone]            May 18, 2017  9:30 AM CDT   Ortho Treatment with Carlton Graves, PT   [facility] Physical Therapy ([facility])    [address] [place] [address]   [phone]            May 23, 2017  2:30 PM CDT   Cochlear Implant Return with Vladislav Noble [facility] Audiology ([facility])    [address] [place] [address] [phone]            Jun 28, 2017  1:00 PM CDT   Cochlear Implant Return with Vladislav Noble [facility] Audiology ([facility])    [address] [place] [address] [phone]            Jul 19, 2017  1:00 PM CDT   Cochlear Implant [facility] ([facility])    [address] [place] [id number] [phone]            Oct 18, 2017  1:00 PM CDT   Cochlear Implant [facility] ([facility])    [address] [place] [address] [phone]              Who to contact     Please call your clinic at [phone] to:    Ask questions about your health    Make or cancel appointments    Discuss your medicines    Learn about your test results    Speak to your  doctor   If you have compliments or concerns about an experience at your clinic, or if you wish to file a complaint, please contact [facility] Patient Relations at [phone] or email us at [email or web address]         Additional Information About Your Visit        MyChart Information     BirdDoghart gives you secure access to your electronic health record. If you see a primary care provider, you can also send messages to your care team and make appointments. If you have questions, please call your primary care clinic.  If you do not have a primary care provider, please call [phone] and they will assist you.      Blue Rooster is an electronic gateway that provides easy, online access to your medical records. With Blue Rooster, you can request a clinic appointment, read your test results, renew a prescription or communicate with your care team.     To access your existing account, please contact your [facility] or call [phone] for assistance.        Care EveryWhere ID     This is your Care EveryWhere ID. This could be used by other organizations to access your [place] medical records  [id number]         Blood Pressure from Last 3 Encounters:   04/26/17 135/73   04/07/17 125/71   04/04/17 145/75    Weight from Last 3 Encounters:   04/26/17 107 kg (236 lb)   04/21/17 108 kg (238 lb)   04/07/17 105.7 kg (233 lb)              We Performed the Following     LT: Diagnostic Analysis of CI 7 yrs & over, Subsequent Programming   (42037)        Primary Care Provider Office Phone # Fax #    Xu Wells PA-C [phone]       [facility] [address] [place] [address]        Thank you!     Thank you for choosing [facility] AUDIOLOGY  for your care. Our goal is always to provide you with excellent care. Hearing back from our patients is one way we can continue to improve our services. Please take a few minutes to complete the  written survey that you may receive in the mail after your visit with us. Thank you!             Your Updated Medication List - Protect others around you: Learn how to safely use, store and throw away your medicines at www.disposemymeds.org.          This list is accurate as of: 5/9/17 11:59 PM.  Always use your most recent med list.                   Brand Name Dispense Instructions for use    ACETAMINOPHEN PO      Take 500 mg by mouth 2 every 6 hours, morning, noon and night. As Needed for shoulder.       albuterol 108 (90 BASE) MCG/ACT Inhaler    PROAIR HFA/PROVENTIL HFA/VENTOLIN HFA    1 Inhaler    Inhale 2 puffs into the lungs every 6 hours as needed for shortness of breath / dyspnea or wheezing       aspirin 325 MG EC tablet      one daily       atenolol 50 MG tablet    TENORMIN    90 tablet    Take 1 tablet (50 mg) by mouth daily       atorvastatin 80 MG tablet    LIPITOR    90 tablet    Take 1 tablet (80 mg) by mouth daily       carbidopa-levodopa  MG per tablet    SINEMET    180 tablet    Take 2 tablets by mouth daily At bedtime.  Written as 90 day supply.       cefadroxil 500 MG capsule    DURICEF    60 capsule    Take 1 capsule (500 mg) by mouth 2 times daily       fluorouracil 5 % cream    EFUDEX    40 g    Apply topically 2 times daily       GLUCOSAMINE CHONDR 1500 COMPLX PO      glucosamine(1500mg)plus chondroitin(1200mg) takes 1 tablet daily       hydrochlorothiazide 25 MG tablet    HYDRODIURIL    90 tablet    Take 1 tablet (25 mg) by mouth every morning       lactobacillus rhamnosus (GG) capsule     90 capsule    Take 1 capsule by mouth daily       lisinopril 40 MG tablet    PRINIVIL/ZESTRIL    90 tablet    Take 1 tablet (40 mg) by mouth daily       MUCINEX 600 MG 12 hr tablet   Generic drug:  guaiFENesin      Take 1,200 mg by mouth 2 times daily       nitroglycerin 0.4 MG sublingual tablet    NITROSTAT    30 tablet    Place 1 tablet (0.4 mg) under the tongue every 5 minutes as needed for chest  pain       OMEGA-3 FISH OIL PO      Take by mouth 2 times daily (with meals) 1200mg/360mg       order for DME      Equipment being ordered: CPAP Cas Noriega received a [employer] AirSense 10 CPAP. Pressures were set at Auto 11 - 15 cm H2O.       order for DME     1 Device    one digital automatic Home blood pressure machine ( per insurance)       traZODone 100 MG tablet    DESYREL    90 tablet    Take 1 tablet (100 mg) by mouth At Bedtime

## 2017-05-09 NOTE — MR AVS SNAPSHOT
After Visit Summary   5/9/2017    Cas Noriega    MRN: [id number]           Patient Information     Date Of Birth          1945        Visit Information        Provider Department      5/9/2017 10:00 AM Caitlyn Alvarado SLP [facility] Aud[facility]        Today's Diagnoses     Sensory hearing loss, bilateral    -  1       Follow-ups after your visit        Your next 10 appointments already scheduled     May 11, 2017 10:00 AM CDT   Ortho Treatment with Carlton Graves PT   [facility] ([facility])    [address] [place] [address]   [phone]            May 18, 2017  9:30 AM CDT   Ortho Treatment with Carlton Graves, PT   [facility] ([facility])    [address] [place] [address]   [phone]            May 23, 2017  2:30 PM CDT   Cochlear Implant Return with Vladislav Noble [facility] Audiology ([facility])    [address] [place] [id number] [phone]            Jun 28, 2017  1:00 PM CDT   Cochlear Implant Return with Vladislav Noble [facility] ([facility])    [address] [place] [id number] [phone]            Jul 19, 2017  1:00 PM CDT   Cochlear Implant [facility] ([facility])    [address] [place] [id number] [phone]            Oct 18, 2017  1:00 PM CDT   Cochlear Implant Return with [facility] ([facility])    [address] [place] [id number] [phone]              Who to contact     Please call your clinic at [phone] to:    Ask questions about your health    Make or cancel appointments    Discuss your medicines    Learn about your test results    Speak to your doctor    If you have compliments or concerns about an experience at your clinic, or if you wish to file a complaint, please contact [facility] Patient Relations at [phone] or email us at [email or web address]         Additional Information About Your Visit        MyChart Information     Ma-papeteriehart gives you secure access to your electronic health record. If you see a primary care provider, you can also send messages to your care team and make appointments. If you have questions, please call your primary care clinic.  If you do not have a primary care provider, please call [phone] and they will assist you.      Mitro is an electronic gateway that provides easy, online access to your medical records. With Mitro, you can request a clinic appointment, read your test results, renew a prescription or communicate with your care team.     To access your existing account, please contact your [facility] or call [phone] for assistance.        Care EveryWhere ID     This is your Care EveryWhere ID. This could be used by other organizations to access your [place] medical records  [id number]         Blood Pressure from Last 3 Encounters:   04/26/17 135/73   04/07/17 125/71   04/04/17 145/75    Weight from Last 3 Encounters:   04/26/17 107 kg (236 lb)   04/21/17 108 kg (238 lb)   04/07/17 105.7 kg (233 lb)              We Performed the Following     Eval: Sound production with lang comprehension and expression (84987)     Spoken Language Comprehension Current Status ()     Spoken Language Comprehension D/C Status ()     Spoken Language Comprehension Goal  Status ()        Primary Care Provider Office Phone # Fax #    Xu Wells PA-C [phone]       [address] [address]        Thank you!     Thank you for choosing [facility] AUDIOLOGY  for your care. Our goal is always to provide  you with excellent care. Hearing back from our patients is one way we can continue to improve our services. Please take a few minutes to complete the written survey that you may receive in the mail after your visit with us. Thank you!             Your Updated Medication List - Protect others around you: Learn how to safely use, store and throw away your medicines at www.disposemymeds.org.          This list is accurate as of: 5/9/17  2:20 PM.  Always use your most recent med list.                   Brand Name Dispense Instructions for use    ACETAMINOPHEN PO      Take 500 mg by mouth 2 every 6 hours, morning, noon and night. As Needed for shoulder.       albuterol 108 (90 BASE) MCG/ACT Inhaler    PROAIR HFA/PROVENTIL HFA/VENTOLIN HFA    1 Inhaler    Inhale 2 puffs into the lungs every 6 hours as needed for shortness of breath / dyspnea or wheezing       aspirin 325 MG EC tablet      one daily       atenolol 50 MG tablet    TENORMIN    90 tablet    Take 1 tablet (50 mg) by mouth daily       atorvastatin 80 MG tablet    LIPITOR    90 tablet    Take 1 tablet (80 mg) by mouth daily       carbidopa-levodopa  MG per tablet    SINEMET    180 tablet    Take 2 tablets by mouth daily At bedtime.  Written as 90 day supply.       cefadroxil 500 MG capsule    DURICEF    60 capsule    Take 1 capsule (500 mg) by mouth 2 times daily       fluorouracil 5 % cream    EFUDEX    40 g    Apply topically 2 times daily       GLUCOSAMINE CHONDR 1500 COMPLX PO      glucosamine(1500mg)plus chondroitin(1200mg) takes 1 tablet daily       hydrochlorothiazide 25 MG tablet    HYDRODIURIL    90 tablet    Take 1 tablet (25 mg) by mouth every morning       lactobacillus rhamnosus (GG) capsule     90 capsule    Take 1 capsule by mouth daily       lisinopril 40 MG tablet    PRINIVIL/ZESTRIL    90 tablet    Take 1 tablet (40 mg) by mouth daily       MUCINEX 600 MG 12 hr tablet   Generic drug:  guaiFENesin      Take 1,200 mg by mouth 2 times daily        nitroglycerin 0.4 MG sublingual tablet    NITROSTAT    30 tablet    Place 1 tablet (0.4 mg) under the tongue every 5 minutes as needed for chest pain       OMEGA-3 FISH OIL PO      Take by mouth 2 times daily (with meals) 1200mg/360mg       order for DME      Equipment being ordered: CPAP Cas Noriega received a [employer] AirSense 10 CPAP. Pressures were set at Auto 11 - 15 cm H2O.       order for DME     1 Device    one digital automatic Home blood pressure machine ( per insurance)       traZODone 100 MG tablet    DESYREL    90 tablet    Take 1 tablet (100 mg) by mouth At Bedtime

## 2017-05-09 NOTE — PROGRESS NOTES
AUDIOLOGY REPORT    BACKGROUND INFORMATION: Dr. Mickey Turpin implanted Cas Noriega with a right  [employer] HiRes 90k Advantage midscala MS cochlear implant (CI) on 12/17/15 and a left HiRes 90k Ultra CI on 3/27/17 due to bilateral severe to profound sensorineural hearing loss and lack of benefit from hearing aids.  The patient is being seen for programming of his left CI on 5/9/17 in Audiology at the [facility]    PATIENT REPORT: Patient reported that he is overall very pleased with the left cochlear implant and that it has exceeded his expectations. When at home or in the waiting room he reported a low level hum noise that he does not hear in the schulte. He met with Caitlyn Alvarado for aural rehabilitation prior to our appointment and also reported hearing the hum in the speech room.    FITTING SESSION: Dr. Mickey Turpin, cochlear implant surgeon, ordered today's appointment. The patient came to the clinic for adjustment to the programs in the external speech processor and for assessment of the external components of the cochlear implant system. These components provide power and data to the internal device. Sound is only heard once the external portion is activated. Postoperative treatment, including device fitting and adjustment, audiologic assessments, and training are required at regular intervals. Testing can include electropsychophysical measures of threshold, comfort, and loudness balancing, which are completed to update the program.    Processor type: Lydia CI Q90  Headpiece type: UHP  Magnet strength: Right: 3, Left: 4    TEST RESULTS:   Electrode Impedances: Left: Electrode 7 open.  Neural Response Testing: Attempted, however no responses were obtained today due to patient discomfort with volume.  Facial Stimulation: Absent  Tinnitus: Present  Balance Problems: Absent  Pain/Discomfort: Absent  Strategies Tried: Optima P, Optima S  Strategy  Preference: Undetermined    Programs:  1. #4: Measured M's: autoUltraZoom  2. #5: Measured M's: Omnidirectional      Number of Channels per Program: 15 (Electrode 7 open therefore disabled)    COMMENTS:  M levels were measured using loudness scaling with tone bursts. Patient was able to complete task successfully. There were increases in M levels across the array. In live voice he reported my voice was clear and comfortable but with the right processor he reported the volume was too loud. I decreased M's until he reported the volume was balanced between ears.    I programmed an autoUltraZoom program for him to try with [employer].       SUMMARY AND RECOMMENDATIONS: Cas was seen for left cochlear implant programming today and. He will return in 1 week or sooner if concerns arise. Please call this clinic with questions regarding these results or recommendations.    Peña Childress  Licensed Audiologist  [place] License #[id number]

## 2017-05-09 NOTE — PROGRESS NOTES
Outpatient  Speech Therapy Aural Rehab Evaluation     Visit Type  Visit Type: Initial       Present: No     General Information  Referring Physician: Dr. Dominguez Turpin  Orders: Evaluate and treat as clinically indicated  Date of Order: 05/08/17  Start of Care Date: 05/09/17    Patient Information  Current Diagnosis: Bilateral sensorineural hearing loss    Age Hearing Loss Identified: First identified in the 1970's    Etiology of Hearing Loss: Unknown; however, his hearing loss has been progressive    Age of Amplification: Received first set of hearing aids 15-20 years ago    Description of Current Communication / Issues: Cas reported that he is very happy with the benefit he is receiving from his new (left) cochlear implant.  He reported that he is understanding family and friends more consistently at home and is relying a little less on lip reading.  He continues to struggle with understanding the TV, but reported increased understanding while wearing both CIs.  He continues to report difficulty listening in background noise.    Implant History: Cas received a right cochlear implant on 12/17/15 with activation on 1/12/16.  He received a left cochlear implant on 3/27/17 with activation on 4/21/17    Previous Treatment: Cas completed an aural rehabilitation evaluation with GAURANG Trujillo following his right cochlear implant. He did not return for therapy services.    General Therapy Interventions     Intervention Comments: Aural rehabilitation is recommended; however, Cas lives a great distance from this clinic and has elected to complete a home program. He will not return for weekly aural rehabilitation sessions.    Clinical Findings/Impressions  LING-6: 6/6 detected, 4/6 identified  LING-6 Comments: Confusion between SH/S and M/O    Nucleus Screen: On Screen A, On Screen B, On Screen C, On Screen D  On screen A - Total identified words: 5/5  On screen A - % accuracy: 100 %  On  Screen B - Identify the correct sentence with varying length from a field of 4: Yes  On Screen B - Total identified sentences: 5/5  On screen B - % accuracy: 100 %  On screen C - 1 word variables differing in vowel length: Yes  On screen C - Total identified vowels: 5/5  On screen C - % accuracy: 100 %  On screen D -  repeat common sentences read to him/her without visual print: Yes  On screen D - Total identified words: 15/24  On screen D - % accuracy: 77 %  Nucleus Screen Comments: All of today's testing was completed in quiet while Cas was wearing his new (left) cochlear implant alone.  Visual cues were reduced through use of a speech screen.    LNT / MLNT Word List: Lexical Neighborhood Test, Multisyllabic Lexical Neighborhood Test  Lexical Neighborhood Test - Words Correct:: 4/12  Lexical Neighborhood Test - % Accuracy: 33 %  Multisyllabic Lexical Neighborhood Test -Words Correct: 15/24  Multisyllabic Lexical Neighborhood Test - % Accuracy: 63 %  LNT/MLNT Word List Comments: All of today's testing was completed in quiet while Cas was wearing his new (left) cochlear implant alone.  Visual cues were reduced through use of a speech screen.    Speech Intelligibility Rating - Percentage (%) as judged by speech pathologist: 100 %    Voice: Healthy vocal quality    Communication Diagnosis: Cas presents with a decreased ability to identify and comprehend spoken language secondary to his sensorineural hearing loss while wearing his cochlear implants.     Summary: Overall, Cas presents with a decreased ability to identify and comprehend spoken language secondary to his sensorineural hearing loss while wearing his cochlear implants.  It is recommended that he receive aural rehabilitation therapy as this is medically necessary in order to maximize the benefit he receives from his cochlear implant without visual information.  However, Cas lives a great distance from this clinic and has elected to  complete a home program. He will not return for weekly therapy sessions at this time.      Recommendations  Aural Rehab / Speech Therapy Recommended: Yes     Comments:  It is recommended that he receive aural rehabilitation therapy as this is medically necessary in order to maximize the benefit he receives from his cochlear implant without visual information.  However, Cas lives a great distance from this clinic and has elected to complete a home program. He will not return for weekly therapy sessions at this time.     Education  Preferred Learning Style: Listening, Reading, Demonstration  Learner: Patient  Readiness: Acceptance  Method: Booklet/handout, Explanation, Demonstration  Response: Verbalizes understanding     Patient Education: Auditory skill development process, Hierarchy (detection, discrimination, identification, and comprehension), LING 6 sound check, Results of today's evaluation    Patient Education Comments: Cas was given strategies that he can utilize with family, friends, and coworkers to communicate more effectively.  He was also given a homework handout, a handout with websites to practice his listening skills, iPad applications, and information on Phone with Confidence.      Total Evaluation Time  Total Evaluation Time: 50     Thank you for your referral.  It was a pleasure to meet Cas today. I look forward to following his progress and supporting him in future visits. Please feel free to contact me with any questions regarding the aural rehabilitation evaluation or recommendations in this report at [email or web address].     UMAIR Tomlinson, CCC-SLP  Speech-Language Pathologist   Aural Rehabilitation Specialist

## 2017-05-11 ENCOUNTER — HOSPITAL ENCOUNTER (OUTPATIENT)
Dept: PHYSICAL THERAPY | Facility: CLINIC | Age: 72
Setting detail: THERAPIES SERIES
End: 2017-05-11
Attending: FAMILY MEDICINE
Payer: MEDICARE

## 2017-05-11 PROCEDURE — 97110 THERAPEUTIC EXERCISES: CPT | Mod: GP

## 2017-05-11 PROCEDURE — 40000718 ZZHC STATISTIC PT DEPARTMENT ORTHO VISIT

## 2017-05-23 ENCOUNTER — OFFICE VISIT (OUTPATIENT)
Dept: AUDIOLOGY | Facility: CLINIC | Age: 72
End: 2017-05-23

## 2017-05-23 DIAGNOSIS — I10 ESSENTIAL HYPERTENSION WITH GOAL BLOOD PRESSURE LESS THAN 140/90: ICD-10-CM

## 2017-05-23 DIAGNOSIS — G25.81 RESTLESS LEGS SYNDROME (RLS): ICD-10-CM

## 2017-05-23 DIAGNOSIS — E78.5 HYPERLIPIDEMIA WITH TARGET LDL LESS THAN 70: ICD-10-CM

## 2017-05-23 DIAGNOSIS — G47.00 INSOMNIA, UNSPECIFIED TYPE: ICD-10-CM

## 2017-05-23 DIAGNOSIS — H90.3 SENSORINEURAL HEARING LOSS, BILATERAL: Primary | ICD-10-CM

## 2017-05-23 DIAGNOSIS — T84.50XS PROSTHETIC JOINT INFECTION, SEQUELA: ICD-10-CM

## 2017-05-23 RX ORDER — CEFADROXIL 500 MG/1
500 CAPSULE ORAL 2 TIMES DAILY
Qty: 60 CAPSULE | Refills: 0 | Status: SHIPPED | OUTPATIENT
Start: 2017-05-23 | End: 2017-05-24

## 2017-05-23 NOTE — TELEPHONE ENCOUNTER
Cefadroxil      Last Written Prescription Date: 02/21/17  Last Fill Quantity: 60,  # refills: 3   Last Office Visit with [facility], [facility] or [facility] prescribing provider: 03/14/17   Natacha Butterfield CPhT  [facility] [phone] Fx [phone]  [email or web address]

## 2017-05-23 NOTE — MR AVS SNAPSHOT
After Visit Summary   5/23/2017    Cas Noriega    MRN: [id number]           Patient Information     Date Of Birth          1945        Visit Information        Provider Department      5/23/2017 2:30 PM Macrina Barriga AuD [facility]        Today's Diagnoses     Sensorineural hearing loss, bilateral    -  1       Follow-ups after your visit        Your next 10 appointments already scheduled     May 25, 2017 10:00 AM CDT   Ortho Treatment with Carlton Graves PT   [facility] ([facility])    [address] [place] [id number]   [phone]            Jun 28, 2017  1:00 PM CDT   Cochlear Implant Return with Vladislav Noble [facility] ([facility])    [address] [place] [id number] [phone]            Jul 19, 2017  1:00 PM CDT   Cochlear Implant Return with Vladislav Noble [facility] ([facility])    [address] [place] [id number] [phone]            Oct 18, 2017  1:00 PM CDT   Cochlear Implant Return with Vladislav Noble [facility] ([facility])    [address] [place] [address] [id number] [phone]              Who to contact     Please call your clinic at [phone] to:    Ask questions about your health    Make or cancel appointments    Discuss your medicines    Learn about your test results    Speak to your doctor   If you have compliments or concerns about an experience at your clinic, or if you wish to file a complaint, please contact [facility] Patient Relations at [phone] or email us at [email or web address]         Additional Information About Your Visit        MyChart Information     MyChart gives you secure access to your electronic health record. If you see a primary care provider,  you can also send messages to your care team and make appointments. If you have questions, please call your primary care clinic.  If you do not have a primary care provider, please call [phone] and they will assist you.      [employer] is an electronic gateway that provides easy, online access to your medical records. With [employer], you can request a clinic appointment, read your test results, renew a prescription or communicate with your care team.     To access your existing account, please contact your [facility] or call [phone] for assistance.        Care EveryWhere ID     This is your Care EveryWhere ID. This could be used by other organizations to access your [facility] medical records  [id number]         Blood Pressure from Last 3 Encounters:   04/26/17 135/73   04/07/17 125/71   04/04/17 145/75    Weight from Last 3 Encounters:   04/26/17 107 kg (236 lb)   04/21/17 108 kg (238 lb)   04/07/17 105.7 kg (233 lb)              We Performed the Following     LT: Diagnostic Analysis of CI 7 yrs & over, Subsequent Programming   (56494)          Today's Medication Changes          These changes are accurate as of: 5/23/17 11:59 PM.  If you have any questions, ask your nurse or doctor.               Start taking these medicines.        Dose/Directions    cefadroxil 500 MG capsule   Commonly known as:  DURICEF   Used for:  Prosthetic joint infection, sequela   Started by:  Xu Wells PA-C        Dose:  500 mg   Take 1 capsule (500 mg) by mouth 2 times daily   Quantity:  180 capsule   Refills:  0            Where to get your medicines      These medications were sent to [facility] [place] [id number] [place] [address]     Phone:  [phone]     atenolol 50 MG tablet    atorvastatin 80 MG tablet    carbidopa-levodopa  MG per tablet    cefadroxil 500 MG capsule    hydrochlorothiazide 25 MG tablet    lisinopril  40 MG tablet    traZODone 100 MG tablet                Primary Care Provider Office Phone # Fax #    Xu Wells PA-C [phone]       [facility] [address] [address]        Thank you!     Thank you for choosing [facility]  for your care. Our goal is always to provide you with excellent care. Hearing back from our patients is one way we can continue to improve our services. Please take a few minutes to complete the written survey that you may receive in the mail after your visit with us. Thank you!             Your Updated Medication List - Protect others around you: Learn how to safely use, store and throw away your medicines at www.disposemymeds.org.          This list is accurate as of: 5/23/17 11:59 PM.  Always use your most recent med list.                   Brand Name Dispense Instructions for use    ACETAMINOPHEN PO      Take 500 mg by mouth 2 every 6 hours, morning, noon and night. As Needed for shoulder.       albuterol 108 (90 BASE) MCG/ACT Inhaler    PROAIR HFA/PROVENTIL HFA/VENTOLIN HFA    1 Inhaler    Inhale 2 puffs into the lungs every 6 hours as needed for shortness of breath / dyspnea or wheezing       aspirin 325 MG EC tablet      one daily       atenolol 50 MG tablet    TENORMIN    90 tablet    Take 1 tablet (50 mg) by mouth daily       atorvastatin 80 MG tablet    LIPITOR    90 tablet    Take 1 tablet (80 mg) by mouth daily       carbidopa-levodopa  MG per tablet    SINEMET    180 tablet    Take 2 tablets by mouth daily At bedtime.  Written as 90 day supply.       cefadroxil 500 MG capsule    DURICEF    180 capsule    Take 1 capsule (500 mg) by mouth 2 times daily       fluorouracil 5 % cream    EFUDEX    40 g    Apply topically 2 times daily       GLUCOSAMINE CHONDR 1500 COMPLX PO      glucosamine(1500mg)plus chondroitin(1200mg) takes 1 tablet daily       hydrochlorothiazide 25 MG tablet    HYDRODIURIL    90 tablet    Take 1 tablet (25  mg) by mouth every morning       lactobacillus rhamnosus (GG) capsule     90 capsule    Take 1 capsule by mouth daily       lisinopril 40 MG tablet    PRINIVIL/ZESTRIL    90 tablet    Take 1 tablet (40 mg) by mouth daily       MUCINEX 600 MG 12 hr tablet   Generic drug:  guaiFENesin      Take 1,200 mg by mouth 2 times daily       nitroglycerin 0.4 MG sublingual tablet    NITROSTAT    30 tablet    Place 1 tablet (0.4 mg) under the tongue every 5 minutes as needed for chest pain       OMEGA-3 FISH OIL PO      Take by mouth 2 times daily (with meals) 1200mg/360mg       order for DME      Equipment being ordered: CPAP Cas Noriega received a [employer] AirSense 10 CPAP. Pressures were set at Auto 11 - 15 cm H2O.       order for DME     1 Device    one digital automatic Home blood pressure machine ( per insurance)       traZODone 100 MG tablet    DESYREL    90 tablet    Take 1 tablet (100 mg) by mouth At Bedtime

## 2017-05-23 NOTE — TELEPHONE ENCOUNTER
Trazodone       Last Written Prescription Date: 11/28/16  Last Fill Quantity: 90; # refills: 0  Last Office Visit with [facility] or [facility] prescribing provider:  03/14/17        Last PHQ-9 score on record=   PHQ-9 SCORE 1/12/2016   Total Score -   Total Score 0       Lab Results   Component Value Date    AST 31 02/26/2016     Lab Results   Component Value Date    ALT 27 04/19/2017     Hydrochlorothiazide      Last Written Prescription Date: 11/28/16  Last Fill Quantity: 90, # refills: 0  Last Office Visit with [facility], [facility] or  Health prescribing provider: 03/14/17        Potassium   Date Value Ref Range Status   04/19/2017 3.5 3.4 - 5.3 mmol/L Final     Creatinine   Date Value Ref Range Status   04/19/2017 0.81 0.66 - 1.25 mg/dL Final     BP Readings from Last 3 Encounters:   04/26/17 135/73   04/07/17 125/71   04/04/17 145/75     Atenolol      Last Written Prescription Date: 11/28/16  Last Fill Quantity: 90, # refills: 0    Last Office Visit with [facility], [facility] or [facility] prescribing provider:  03/14/17    Future Office Visit:        BP Readings from Last 3 Encounters:   04/26/17 135/73   04/07/17 125/71   04/04/17 145/75     Lisinopril      Last Written Prescription Date: 11/28/16  Last Fill Quantity: 90, # refills: 0  Last Office Visit with [facility], [facility] or [facility] prescribing provider: 03/14/17        Potassium   Date Value Ref Range Status   04/19/2017 3.5 3.4 - 5.3 mmol/L Final     Creatinine   Date Value Ref Range Status   04/19/2017 0.81 0.66 - 1.25 mg/dL Final     BP Readings from Last 3 Encounters:   04/26/17 135/73   04/07/17 125/71   04/04/17 145/75     Carbidopa-Levodopa     Last Written Prescription Date: 11/28/16  Last Fill Quantity: 180, # refills: 0  Last Office Visit with [facility], [facility] or [facility] prescribing provider: 03/14/17         BP Readings from Last 3 Encounters:   04/26/17 135/73   04/07/17 125/71   04/04/17 145/75     Atorvastatin     Last Written Prescription Date: 11/28/16  Last Fill Quantity: 90, #  refills: 0  Last Office Visit with [facility], [facility] or [facility] prescribing provider: 03/14/17        Lab Results   Component Value Date    CHOL 152 04/19/2017     Lab Results   Component Value Date    HDL 51 04/19/2017     Lab Results   Component Value Date    LDL 77 04/19/2017     Lab Results   Component Value Date    TRIG 119 04/19/2017     Lab Results   Component Value Date    CHOLHDLRATIO 3.4 08/10/2015     Natacha Butterfield CPhT  [facility] [phone] Fx [phone]  [email or web address]

## 2017-05-24 RX ORDER — CARBIDOPA AND LEVODOPA 25; 100 MG/1; MG/1
2 TABLET ORAL DAILY
Qty: 180 TABLET | Refills: 1 | Status: SHIPPED | OUTPATIENT
Start: 2017-05-24 | End: 2017-11-17

## 2017-05-24 RX ORDER — HYDROCHLOROTHIAZIDE 25 MG/1
25 TABLET ORAL EVERY MORNING
Qty: 90 TABLET | Refills: 1 | Status: SHIPPED | OUTPATIENT
Start: 2017-05-24 | End: 2017-11-17

## 2017-05-24 RX ORDER — TRAZODONE HYDROCHLORIDE 100 MG/1
100 TABLET ORAL AT BEDTIME
Qty: 90 TABLET | Refills: 1 | Status: SHIPPED | OUTPATIENT
Start: 2017-05-24 | End: 2017-11-17

## 2017-05-24 RX ORDER — LISINOPRIL 40 MG/1
40 TABLET ORAL DAILY
Qty: 90 TABLET | Refills: 1 | Status: SHIPPED | OUTPATIENT
Start: 2017-05-24 | End: 2017-11-17

## 2017-05-24 RX ORDER — ATORVASTATIN CALCIUM 80 MG/1
80 TABLET, FILM COATED ORAL DAILY
Qty: 90 TABLET | Refills: 3 | Status: SHIPPED | OUTPATIENT
Start: 2017-05-24 | End: 2017-11-17

## 2017-05-24 RX ORDER — ATENOLOL 50 MG/1
50 TABLET ORAL DAILY
Qty: 90 TABLET | Refills: 1 | Status: SHIPPED | OUTPATIENT
Start: 2017-05-24 | End: 2017-11-17

## 2017-05-24 RX ORDER — CEFADROXIL 500 MG/1
500 CAPSULE ORAL 2 TIMES DAILY
Qty: 180 CAPSULE | Refills: 0 | Status: SHIPPED | OUTPATIENT
Start: 2017-05-24 | End: 2017-11-17

## 2017-05-24 NOTE — PROGRESS NOTES
AUDIOLOGY REPORT    BACKGROUND INFORMATION: Dr. Mickey Turpin implanted Cas Noriega with a right  [employer] HiRes 90k Advantage midscala MS cochlear implant (CI) on 12/17/15 and a left HiRes 90k Ultra CI on 3/27/17 due to bilateral severe to profound sensorineural hearing loss and lack of benefit from hearing aids.  The patient is being seen for programming of his left CI on 5/24/17 in Audiology at the [facility] [place]    PATIENT REPORT: Patient reported that he is overall very pleased with the left cochlear implant but is having some difficulties with intermittent static. He reported that in the afternoon a loud static will start and then will resolve in a few minutes. It happens a few times a day. He also reported that occasionally the sound will get very soft and then ramp up.    FITTING SESSION: Dr. Mickey Turpin, cochlear implant surgeon, ordered today's appointment. The patient came to the clinic for adjustment to the programs in the external speech processor and for assessment of the external components of the cochlear implant system. These components provide power and data to the internal device. Sound is only heard once the external portion is activated. Postoperative treatment, including device fitting and adjustment, audiologic assessments, and training are required at regular intervals. Testing can include electropsychophysical measures of threshold, comfort, and loudness balancing, which are completed to update the program.    Processor type: Lydia CI Q90  Headpiece type: UHP  Magnet strength: Right: 3, Left: 4    TEST RESULTS:   Electrode Impedances: Left: Electrode 7 open.  Neural Response Testing: Attempted, however no responses were obtained today due to patient discomfort with volume.  Facial Stimulation: Absent  Tinnitus: Present  Balance Problems: Absent  Pain/Discomfort: Absent  Strategies Tried: Optima P, Optima S  Strategy Preference:  Undetermined    Programs:  1. #6: Measured M's: Omnidirectional      Number of Channels per Program: 15 (Electrode 7 open therefore disabled)    COMMENTS:  M levels were measured using loudness scaling with tone bursts. Patient was able to complete task successfully. There were increases in M levels across the array. In live voice he reported my voice was clear and comfortable but with the right processor he reported the volume was too loud. I decreased M's until he reported the volume was balanced between ears.    We discussed the cause of the static and I examined his T-emre and processor which looked like there was evidence of corrosion. He has not been using the Dry Kit. I will RMA his processor and T-emre and he will begin using the Dry Kit to see if that resolves his issue.      SUMMARY AND RECOMMENDATIONS: Cas was seen for left cochlear implant programming today and. He will return in 3 weeks or sooner if concerns arise. Please call this clinic with questions regarding these results or recommendations.    Peña Childress  Licensed Audiologist  [place] License #[id number]

## 2017-05-25 ENCOUNTER — HOSPITAL ENCOUNTER (OUTPATIENT)
Dept: PHYSICAL THERAPY | Facility: CLINIC | Age: 72
Setting detail: THERAPIES SERIES
End: 2017-05-25
Attending: FAMILY MEDICINE
Payer: MEDICARE

## 2017-05-25 PROCEDURE — 40000718 ZZHC STATISTIC PT DEPARTMENT ORTHO VISIT

## 2017-05-25 PROCEDURE — 97110 THERAPEUTIC EXERCISES: CPT | Mod: GP

## 2017-06-08 ENCOUNTER — HOSPITAL ENCOUNTER (OUTPATIENT)
Dept: PHYSICAL THERAPY | Facility: CLINIC | Age: 72
Setting detail: THERAPIES SERIES
End: 2017-06-08
Attending: FAMILY MEDICINE
Payer: MEDICARE

## 2017-06-08 PROCEDURE — 40000718 ZZHC STATISTIC PT DEPARTMENT ORTHO VISIT

## 2017-06-08 PROCEDURE — 97140 MANUAL THERAPY 1/> REGIONS: CPT | Mod: GP

## 2017-06-08 PROCEDURE — 97110 THERAPEUTIC EXERCISES: CPT | Mod: GP

## 2017-06-22 ENCOUNTER — HOSPITAL ENCOUNTER (OUTPATIENT)
Dept: PHYSICAL THERAPY | Facility: CLINIC | Age: 72
Setting detail: THERAPIES SERIES
End: 2017-06-22
Attending: FAMILY MEDICINE
Payer: MEDICARE

## 2017-06-22 PROCEDURE — 40000718 ZZHC STATISTIC PT DEPARTMENT ORTHO VISIT

## 2017-06-22 PROCEDURE — G8985 CARRY GOAL STATUS: HCPCS | Mod: GP,CJ

## 2017-06-22 PROCEDURE — G8984 CARRY CURRENT STATUS: HCPCS | Mod: GP,CJ

## 2017-06-22 PROCEDURE — 97110 THERAPEUTIC EXERCISES: CPT | Mod: GP

## 2017-06-22 NOTE — PROGRESS NOTES
"RECERTIFICATION    Cas Noriega  1945    Session Number: 14/18 /[facility] since start of care.    Reasons for Continuing Treatment:   See \"Progress towards goals\" in progress note 6/22/17 at 10:56am    Frequency/Duration  2 times per month for 6 weeks for a total of 18 visits. No more visits requested (date extension only)    Recertification Period  6/22/17 - 8/3/2017    Physician Signature:    Date:    X_______________________________________________________    Physician Name: EZ WALLIS MD    I certify the need for these services furnished under this plan of treatment and while under my care. Physician co-signature of this document indicates review and certification of the therapy plan.  This signature may be written on paper, or electronically signed within EPIC.       Thank You,    Carlton Graves, PT, DPT  Doctor of Physical Therapy  [facility]  [phone]       "

## 2017-06-22 NOTE — PROGRESS NOTES
"Outpatient Physical Therapy Progress Note     Patient: Cas Noriega  : 1945    Beginning/End Dates of Reporting Period:  3/16/2017 to 2017    Referring Provider: Bryce Leigh MD    Therapy Diagnosis: Chronic R shoulder pain     Client Self Report: Pt states his right shoulder is feeling more stiff today than previously. 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 and he can tell \"he's been using it more.\"    Objective Measurements:  Objective Measure: R shoulder AROM  Details: flex=65*, abd=45, at 0* abd: ER=60, IR=to stomach  Objective Measure: R shoulder PROM  Details: flex=90*, abd=85*        Objective Measure: MMT at 0* abd  Details: flex=4/5, ext= 4+/5, abd=3+/5, ER=3+/5, IR=4-/5  Objective Measure: SPADI  Details: 13.33% disability       Outcome Measures (most recent score):  SPADI: 13.33% disability (On 2017, pt scored 11.5%)    Goals:  Goal Identifier don deodorant   Goal Description Following PT interventions, pt will increase R shoulder abd AROM to 90* to be able to don deodorant with less difficulty   Target Date 17   Date Met   (lacking 2*, pt has met functionally with modifications)   Progress:     Goal Identifier Reach high shelf   Goal Description Following PT interventions, pt will increase R shoulder flex AROM to 100* to be able to reach high shelf with less difficulty   Target Date 17   Date Met  17   Progress:     Goal Identifier push   Goal Description Following PT interventions, pt will increase R shoulder ER MMT at 0* abd to 4/5 grade to be able to push with less pain   Target Date 17   Date Met   (Pt met functionally, no pain pushing things forward)   Progress:     Goal Identifier wash head   Goal Description Following PT interventions, pt will score 20% on SPADI for MCID and less pain with washing head when using R shoulder   Target Date 17   Date Met  17   Progress:         Progress Toward " Goals:   Progress this reporting period: Pt demonstrated decreased AROM today following active weekend but demonstrated increased R shoulder coordination and control. Prior to today, pt has demonstrated increased R shoulder AROM and strength since initial evaluation. Pt has met 2/4 goals. With continued skilled PT, pt should continue progress toward remaining 2 goals.       Plan:  Continue therapy per current plan of care.    Discharge:  No

## 2017-06-28 ENCOUNTER — OFFICE VISIT (OUTPATIENT)
Dept: AUDIOLOGY | Facility: CLINIC | Age: 72
End: 2017-06-28

## 2017-06-28 DIAGNOSIS — H90.3 SENSORINEURAL HEARING LOSS, BILATERAL: Primary | ICD-10-CM

## 2017-06-28 NOTE — PROGRESS NOTES
AUDIOLOGY REPORT    BACKGROUND INFORMATION: Dr. Mickey Turpin implanted Cas Noriega with a right  [employer] HiRes 90k Advantage midscala MS cochlear implant (CI) on 12/17/15 and a left HiRes 90k Ultra CI on 3/27/17 due to bilateral severe to profound sensorineural hearing loss and lack of benefit from hearing aids.  The patient is being seen for programming of his left CI on 6/24/17 in Audiology at the [facility] [place]    PATIENT REPORT: Patient reported that the right implant is still louder, however that he hears very well with both. He reported he no longer is having the static issues he previously was after replacing the processor and using the dryer.     FITTING SESSION: Dr. Mickey Turpin, cochlear implant surgeon, ordered today's appointment. The patient came to the clinic for adjustment to the programs in the external speech processor and for assessment of the external components of the cochlear implant system. These components provide power and data to the internal device. Sound is only heard once the external portion is activated. Postoperative treatment, including device fitting and adjustment, audiologic assessments, and training are required at regular intervals. Testing can include electropsychophysical measures of threshold, comfort, and loudness balancing, which are completed to update the program.    Processor type: Lydia CI Q90  Headpiece type: UHP  Magnet strength: Right: 3, Left: 4    TEST RESULTS:   Electrode Impedances: Left: Stable and within tolerances. Electrode 7 no longer open  Neural Response Testing: Not tested today  Facial Stimulation: Absent  Tinnitus: Present  Balance Problems: Absent  Pain/Discomfort: Absent  Strategies Tried: Optima P, Optima S  Strategy Preference: Undetermined    Programs:  1. #6: Measured M's: Omnidirectional      Number of Channels per Program: 16 (electrode 7 enabled today due to change in  impedance).    COMMENTS:  M levels were measured using loudness scaling with speech bursts and tone bursts. On electrode 1 he reported that the sound felt like it was vibrating. Overall M levels increased slightly. In live voice he reported that the volume was good but the quality was very echoy. I attempted to disable electrode 1 which he reported solved his sound quality complaints. We balanced binaurally and overall M's were increased in the left until he reported balance.    SUMMARY AND RECOMMENDATIONS: Cas was seen for left cochlear implant programming today and. He will return in 4 weeks or sooner if concerns arise. Please call this clinic with questions regarding these results or recommendations.    Peña Childress  Licensed Audiologist  [place] License #[id number]

## 2017-06-28 NOTE — MR AVS SNAPSHOT
After Visit Summary   6/28/2017    Cas Noriega    MRN: [id number]           Patient Information     Date Of Birth          1945        Visit Information        Provider Department      6/28/2017 1:00 PM Macrina Barriga AuD M [facility] Audiology        Today's Diagnoses     Sensorineural hearing loss, bilateral    -  1       Follow-ups after your visit        Your next 10 appointments already scheduled     Jul 06, 2017 10:00 AM CDT   Ortho Treatment with Carlton Graves PT   [facility] ([facility])    [address] [place] [address]   [phone]            Jul 19, 2017  1:00 PM CDT   Cochlear Implant Return with Vladislav Noble [facility] ([facility])    [address] [place] [address] [phone]            Oct 18, 2017  1:00 PM CDT   Cochlear Implant Return with Vladislav Noble [facility] ([facility])    [address] [place] [address] [phone]              Who to contact     Please call your clinic at [phone] to:    Ask questions about your health    Make or cancel appointments    Discuss your medicines    Learn about your test results    Speak to your doctor   If you have compliments or concerns about an experience at your clinic, or if you wish to file a complaint, please contact [facility] Patient Relations at [phone] or email us at [email or web address]         Additional Information About Your Visit        MyChart Information     [employer] gives you secure access to your electronic health record. If you see a primary care provider, you can also send messages to your care team and make appointments. If you have questions, please call your primary care clinic.  If you do not have a primary care provider, please call [phone] and they will assist  you.      [employer] is an electronic gateway that provides easy, online access to your medical records. With [employer], you can request a clinic appointment, read your test results, renew a prescription or communicate with your care team.     To access your existing account, please contact your [facility] or call [phone] for assistance.        Care EveryWhere ID     This is your Care EveryWhere ID. This could be used by other organizations to access your [place] medical records  [id number]         Blood Pressure from Last 3 Encounters:   04/26/17 135/73   04/07/17 125/71   04/04/17 145/75    Weight from Last 3 Encounters:   04/26/17 107 kg (236 lb)   04/21/17 108 kg (238 lb)   04/07/17 105.7 kg (233 lb)              We Performed the Following     LT: Diagnostic Analysis of CI 7 yrs & over, Subsequent Programming   (67954)        Primary Care Provider Office Phone # Fax #    Xu Wells PA-C [phone]       [place] [id number]        Equal Access to Services     MARIA A ARANA : Hadii aad ku hadasho Sokirstinali, waaxda luqadaha, qaybta kaalmada rowan, rio vela. So [facility] [phone].    ATENCIÓN: Si habla español, tiene a holbrook disposición servicios gratuitos de asistencia lingüística. Idris al [phone].    We comply with applicable federal civil rights laws and [place] laws. We do not discriminate on the basis of race, color, national origin, age, disability sex, sexual orientation or gender identity.            Thank you!     Thank you for choosing [facility] AUDIOLOGY  for your care. Our goal is always to provide you with excellent care. Hearing back from our patients is one way we can continue to improve our services. Please take a few minutes to complete the written survey that you may receive in the mail after your visit with us. Thank you!             Your Updated Medication List -  Protect others around you: Learn how to safely use, store and throw away your medicines at www.disposemymeds.org.          This list is accurate as of: 6/28/17  2:21 PM.  Always use your most recent med list.                   Brand Name Dispense Instructions for use Diagnosis    ACETAMINOPHEN PO      Take 500 mg by mouth 2 every 6 hours, morning, noon and night. As Needed for shoulder.        albuterol 108 (90 BASE) MCG/ACT Inhaler    PROAIR HFA/PROVENTIL HFA/VENTOLIN HFA    1 Inhaler    Inhale 2 puffs into the lungs every 6 hours as needed for shortness of breath / dyspnea or wheezing    Intermittent asthma, uncomplicated       aspirin 325 MG EC tablet      one daily    Hyperlipidemia LDL goal < 70, Acute myocardial infarction of other inferior wall, episode of care unspecified       atenolol 50 MG tablet    TENORMIN    90 tablet    Take 1 tablet (50 mg) by mouth daily    Essential hypertension with goal blood pressure less than 140/90       atorvastatin 80 MG tablet    LIPITOR    90 tablet    Take 1 tablet (80 mg) by mouth daily    Hyperlipidemia with target LDL less than 70       carbidopa-levodopa  MG per tablet    SINEMET    180 tablet    Take 2 tablets by mouth daily At bedtime.  Written as 90 day supply.    Restless legs syndrome (RLS)       cefadroxil 500 MG capsule    DURICEF    180 capsule    Take 1 capsule (500 mg) by mouth 2 times daily    Prosthetic joint infection, sequela       fluorouracil 5 % cream    EFUDEX    40 g    Apply topically 2 times daily    Actinic keratosis       GLUCOSAMINE CHONDR 1500 COMPLX PO      glucosamine(1500mg)plus chondroitin(1200mg) takes 1 tablet daily        hydrochlorothiazide 25 MG tablet    HYDRODIURIL    90 tablet    Take 1 tablet (25 mg) by mouth every morning    Essential hypertension with goal blood pressure less than 140/90       lactobacillus rhamnosus (GG) capsule     90 capsule    Take 1 capsule by mouth daily    Encounter for long-term (current) use of  antibiotics       lisinopril 40 MG tablet    PRINIVIL/ZESTRIL    90 tablet    Take 1 tablet (40 mg) by mouth daily    Essential hypertension with goal blood pressure less than 140/90       MUCINEX 600 MG 12 hr tablet   Generic drug:  guaiFENesin      Take 1,200 mg by mouth 2 times daily        nitroGLYcerin 0.4 MG sublingual tablet    NITROSTAT    30 tablet    Place 1 tablet (0.4 mg) under the tongue every 5 minutes as needed for chest pain    Coronary artery disease involving native heart with angina pectoris, unspecified vessel or lesion type (H)       OMEGA-3 FISH OIL PO      Take by mouth 2 times daily (with meals) 1200mg/360mg        order for DME      Equipment being ordered: CPAP Cas Noriega received a [employer] AirSense 10 CPAP. Pressures were set at Auto 11 - 15 cm H2O.        order for DME     1 Device    one digital automatic Home blood pressure machine ( per insurance)    Unspecified essential hypertension       traZODone 100 MG tablet    DESYREL    90 tablet    Take 1 tablet (100 mg) by mouth At Bedtime    Insomnia, unspecified type

## 2017-07-06 ENCOUNTER — HOSPITAL ENCOUNTER (OUTPATIENT)
Dept: PHYSICAL THERAPY | Facility: CLINIC | Age: 72
Setting detail: THERAPIES SERIES
End: 2017-07-06
Attending: FAMILY MEDICINE
Payer: MEDICARE

## 2017-07-06 PROCEDURE — 97110 THERAPEUTIC EXERCISES: CPT | Mod: GP

## 2017-07-06 PROCEDURE — 40000718 ZZHC STATISTIC PT DEPARTMENT ORTHO VISIT

## 2017-07-19 ENCOUNTER — OFFICE VISIT (OUTPATIENT)
Dept: AUDIOLOGY | Facility: CLINIC | Age: 72
End: 2017-07-19

## 2017-07-19 ENCOUNTER — HOSPITAL ENCOUNTER (OUTPATIENT)
Dept: PHYSICAL THERAPY | Facility: CLINIC | Age: 72
Setting detail: THERAPIES SERIES
End: 2017-07-19
Attending: FAMILY MEDICINE
Payer: MEDICARE

## 2017-07-19 DIAGNOSIS — H90.3 SENSORINEURAL HEARING LOSS, BILATERAL: Primary | ICD-10-CM

## 2017-07-19 PROCEDURE — 40000718 ZZHC STATISTIC PT DEPARTMENT ORTHO VISIT

## 2017-07-19 PROCEDURE — 97110 THERAPEUTIC EXERCISES: CPT | Mod: GP

## 2017-07-19 PROCEDURE — 97140 MANUAL THERAPY 1/> REGIONS: CPT | Mod: GP

## 2017-07-19 NOTE — MR AVS SNAPSHOT
After Visit Summary   7/19/2017    Cas Noriega    MRN: [id number]           Patient Information     Date Of Birth          1945        Visit Information        Provider Department      7/19/2017 1:00 PM Macrina Barriga AuD M [facility] Audiology        Today's Diagnoses     Sensorineural hearing loss, bilateral    -  1       Follow-ups after your visit        Your next 10 appointments already scheduled     Aug 03, 2017 10:00 AM CDT   Ortho Treatment with Carlton Graves PT   [facility] ([facility])    [address] [place] [address]   [phone]            Oct 18, 2017  1:00 PM CDT   Cochlear Implant Return with Vladislav Noble [facility]iology ([facility])    [address] [address] [phone]              Who to contact     Please call your clinic at [phone] to:    Ask questions about your health    Make or cancel appointments    Discuss your medicines    Learn about your test results    Speak to your doctor   If you have compliments or concerns about an experience at your clinic, or if you wish to file a complaint, please contact [facility] Patient Relations at [phone] or email us at [email or web address]         Additional Information About Your Visit        MyChart Information     [employer] gives you secure access to your electronic health record. If you see a primary care provider, you can also send messages to your care team and make appointments. If you have questions, please call your primary care clinic.  If you do not have a primary care provider, please call [phone] and they will assist you.      [employer] is an electronic gateway that provides easy, online access to your medical records. With [employer], you can request a clinic appointment, read your test results, renew a prescription or communicate with your  care team.     To access your existing account, please contact your [facility] or call [phone] for assistance.        Care EveryWhere ID     This is your Care EveryWhere ID. This could be used by other organizations to access your [place] medical records  [id number]         Blood Pressure from Last 3 Encounters:   04/26/17 135/73   04/07/17 125/71   04/04/17 145/75    Weight from Last 3 Encounters:   04/26/17 107 kg (236 lb)   04/21/17 108 kg (238 lb)   04/07/17 105.7 kg (233 lb)              We Performed the Following     AUDIOGRAM/TYMPANOGRAM - INTERFACE     Eval of Aud Rehab Status (60 min)   (90384)     RT: Diagnostic Analysis of CI 7 yrs & over, Subsequent Programming   (11111)        Primary Care Provider Office Phone # Fax #    Xu Wells PA-C [phone]       [place] [address]        Equal Access to Services     MARIA A ARANA AH: Hadii aad ku hadasho Soomaali, waaxda luqadaha, qaybta kaalmada adeegyada, waxay idiin hayaan adeeg bettyarasuzi steel . So [facility] [phone].    ATENCIÓN: Si habla español, tiene a holbrook disposición servicios gratuitos de asistencia lingüística. [facility] [phone].    We comply with applicable federal civil rights laws and [place] laws. We do not discriminate on the basis of race, color, national origin, age, disability sex, sexual orientation or gender identity.            Thank you!     Thank you for choosing [facility] AUDIOLOGY  for your care. Our goal is always to provide you with excellent care. Hearing back from our patients is one way we can continue to improve our services. Please take a few minutes to complete the written survey that you may receive in the mail after your visit with us. Thank you!             Your Updated Medication List - Protect others around you: Learn how to safely use, store and throw away your medicines at www.disposemymeds.org.          This list is  accurate as of: 7/19/17 11:59 PM.  Always use your most recent med list.                   Brand Name Dispense Instructions for use Diagnosis    ACETAMINOPHEN PO      Take 500 mg by mouth 2 every 6 hours, morning, noon and night. As Needed for shoulder.        albuterol 108 (90 BASE) MCG/ACT Inhaler    PROAIR HFA/PROVENTIL HFA/VENTOLIN HFA    1 Inhaler    Inhale 2 puffs into the lungs every 6 hours as needed for shortness of breath / dyspnea or wheezing    Intermittent asthma, uncomplicated       aspirin 325 MG EC tablet      one daily    Hyperlipidemia LDL goal < 70, Acute myocardial infarction of other inferior wall, episode of care unspecified       atenolol 50 MG tablet    TENORMIN    90 tablet    Take 1 tablet (50 mg) by mouth daily    Essential hypertension with goal blood pressure less than 140/90       atorvastatin 80 MG tablet    LIPITOR    90 tablet    Take 1 tablet (80 mg) by mouth daily    Hyperlipidemia with target LDL less than 70       carbidopa-levodopa  MG per tablet    SINEMET    180 tablet    Take 2 tablets by mouth daily At bedtime.  Written as 90 day supply.    Restless legs syndrome (RLS)       cefadroxil 500 MG capsule    DURICEF    180 capsule    Take 1 capsule (500 mg) by mouth 2 times daily    Prosthetic joint infection, sequela       fluorouracil 5 % cream    EFUDEX    40 g    Apply topically 2 times daily    Actinic keratosis       GLUCOSAMINE CHONDR 1500 COMPLX PO      glucosamine(1500mg)plus chondroitin(1200mg) takes 1 tablet daily        hydrochlorothiazide 25 MG tablet    HYDRODIURIL    90 tablet    Take 1 tablet (25 mg) by mouth every morning    Essential hypertension with goal blood pressure less than 140/90       lactobacillus rhamnosus (GG) capsule     90 capsule    Take 1 capsule by mouth daily    Encounter for long-term (current) use of antibiotics       lisinopril 40 MG tablet    PRINIVIL/ZESTRIL    90 tablet    Take 1 tablet (40 mg) by mouth daily    Essential  hypertension with goal blood pressure less than 140/90       MUCINEX 600 MG 12 hr tablet   Generic drug:  guaiFENesin      Take 1,200 mg by mouth 2 times daily        nitroGLYcerin 0.4 MG sublingual tablet    NITROSTAT    30 tablet    Place 1 tablet (0.4 mg) under the tongue every 5 minutes as needed for chest pain    Coronary artery disease involving native heart with angina pectoris, unspecified vessel or lesion type (H)       OMEGA-3 FISH OIL PO      Take by mouth 2 times daily (with meals) 1200mg/360mg        order for DME      Equipment being ordered: CPAP Cardozothanh Noriega received a [employer] AirSense 10 CPAP. Pressures were set at Auto 11 - 15 cm H2O.        order for DME     1 Device    one digital automatic Home blood pressure machine ( per insurance)    Unspecified essential hypertension       traZODone 100 MG tablet    DESYREL    90 tablet    Take 1 tablet (100 mg) by mouth At Bedtime    Insomnia, unspecified type

## 2017-07-20 NOTE — PROGRESS NOTES
AUDIOLOGY REPORT    BACKGROUND INFORMATION: Dr. Mickey Turpin implanted Cas Noriega with a right  Advanced Seatwaves HiRes 90k Advantage midscala MS cochlear implant (CI) on 12/17/15 and a left HiRes 90k Ultra CI on 3/27/17 due to bilateral severe to profound sensorineural hearing loss and lack of benefit from hearing aids.  The patient is being seen for speech perception testing and programming on 7/19/17 in Audiology at the [facility]    PATIENT REPORT: Patient reported that he has been having continued issues with intermittency. He reported one of his left processors does not work at all- it turns on but does not have sound and the right one cuts in and out. He still reports that his understanding with the left implant is quite good.    METHOD: Speech perception testing is conducted at regular intervals to determine the degree of benefit the patient is obtaining from the cochlear implant. Tests are conducted using the cochlear implant without the benefit of lipreading. All tests are conducted in a sound-treated room. Perception of monosyllabic words and words in sentences are tested. Results usually show improvement over time. A decrease in performance indicates the need for re-programming of the prosthesis and/or replacement of components.     INTERVAL: 1.5 year post activation of right CI, 3 months post activation of left CI    TEST RESULTS:  35 minutes were spent assessing the patient s auditory rehabilitation status.    Device(s) used for Testing:   Right ear: Lydia Q90 CI  Left ear: Lydia Q90 CI    Soundfield Aided Thresholds: Aided thresholds in the borderline normal to mild hearing loss range with both CIs.  Unaided Thresholds: Not tested today  Tympanograms: Could not maintain seal    CNC Words Test:  The patient repeats 25 single syllable words, auditory only. The words are presented at 60 dB SPL (conversational level) delivered from a CD  player.    Preoperative Performance:  Left ear aided: words= 0%, phonemes= 6%  Right ear aided: words= 0%, phonemes= 6%    3 months Post-Activation of CI: (4/15/16)  Left: Not tested  Right: words- 32%, phonemes= 55%    10 months Post Activation of CI (11/11/16)  Left ear aided: words= 0%, phonemes= 35%  Right CI: words= 9%, phonemes= 23%    1.5 year post activation of right CI, 3 months post activation of left CI (today)  Left CI: words= 36%, phonemes= 68%  Right CI: words= 8%, phonemes= 24%    [employer] Sentences Test:  The patient repeats 20 sentences, auditory only.  The sentences are presented at 60 dB SPL (conversational level) delivered from a CD player.     Preoperative Performance:  Left ear aided: 10%  Right ear aided: 3%  Bilaterally aided: 26%    3 months Post-Activation of CI: (4/15/16)  Left: 6%  Right: 31%  Bilateral/Bimodal: 46%    10 months Post-Activation of CI (11/11/16):  Left: 18%  Right: 32%  Bilateral/Bimodal: 74%, +10 dB SNR: 55%    1.5 year post activation of right CI, 3 months post activation of left CI (today)  Left: 80%  Right: 25%  Bilateral/Bimodal: 76%, +10 dB SNR: 32%      FITTING SESSION: Dr. Mickey Turpin, cochlear implant surgeon, ordered today's appointment. The patient came to the clinic for adjustment to the programs in the external speech processor and for assessment of the external components of the cochlear implant system. These components provide power and data to the internal device. Sound is only heard once the external portion is activated. Postoperative treatment, including device fitting and adjustment, audiologic assessments, and training are required at regular intervals. Testing can include electropsychophysical measures of threshold, comfort, and loudness balancing, which are completed to update the program.    Processor type: Kiva CI Q90  Headpiece type: UHP  Magnet strength: Right: 3, Left: 4    TEST RESULTS:   Electrode Impedances: Left: Stable and within  tolerances.   Neural Response Testing: Not tested today  Facial Stimulation: Absent  Tinnitus: Present  Balance Problems: Absent  Pain/Discomfort: Absent  Strategies Tried: Optima P, Optima S  Strategy Preference: Optima S left, Optima P right    Programs: Left  1. #7: Measured M's: Omnidirectional    Programs: Right:  1. #23: Omnidirectional  2. #24: Ultra Zoom      Number of Channels per Program: 16 (electrode 7 enabled today due to change in impedance).    COMMENTS:  I began by troubleshooting equipment. Visual inspection showed lots of corrosion and build up on the cable contacts and in the processor/UHP contacts, particularly in the right ear. I attempted to clean them however the cables remained with build up. I will have [employer] send him two new cables as well as a new UHP for his right ear as during programming, the right implant cut in and out multiple times even with a clinic loaner cable.    M levels were measured using speech bursts and tone bursts in the right ear. Patient was able to complete task successfully. M levels increased significantly across the array. In live voice he reported the volume was much too loud. I decreased globally until he reported comfort with the volume and reported clarity was good. Binaurally he reported balance.    SUMMARY AND RECOMMENDATIONS: Cas was seen for bilateral cochlear implant programming today and. He will return in 3 months or sooner if concerns arise. Please call this clinic with questions regarding these results or recommendations.    Peña Childress  Licensed Audiologist  [place] License #[id number]

## 2017-08-03 ENCOUNTER — HOSPITAL ENCOUNTER (OUTPATIENT)
Dept: PHYSICAL THERAPY | Facility: CLINIC | Age: 72
Setting detail: THERAPIES SERIES
End: 2017-08-03
Attending: FAMILY MEDICINE
Payer: MEDICARE

## 2017-08-03 DIAGNOSIS — T84.50XS PROSTHETIC JOINT INFECTION, SEQUELA: ICD-10-CM

## 2017-08-03 PROCEDURE — G8985 CARRY GOAL STATUS: HCPCS | Mod: GP,CJ

## 2017-08-03 PROCEDURE — 40000718 ZZHC STATISTIC PT DEPARTMENT ORTHO VISIT

## 2017-08-03 PROCEDURE — 97110 THERAPEUTIC EXERCISES: CPT | Mod: GP

## 2017-08-03 PROCEDURE — G8984 CARRY CURRENT STATUS: HCPCS | Mod: GP,CJ

## 2017-08-03 RX ORDER — CEFADROXIL 500 MG/1
CAPSULE ORAL
Qty: 180 CAPSULE | Refills: 0 | Status: SHIPPED | OUTPATIENT
Start: 2017-08-03 | End: 2017-11-20

## 2017-08-03 NOTE — PROGRESS NOTES
Outpatient Physical Therapy Progress Note     Patient: Cas Noriega  : 1945    Beginning/End Dates of Reporting Period:  17 to 8/3/2017    Referring Provider: Bryce Leigh MD     Therapy Diagnosis: Chronic R shoulder pain     Client Self Report: Pt states improvement from skilled PT sinse last progress note 65%. Better with lifting on the shelf at 10#. Want to still work on lifting more than 10#. Everything went wrong yesterday, but over the last two weeks pt has had significanlty less LBP.     Objective Measurements:  Objective Measure: R shoulder AROM  Details: flex=100*, abd=92* elbow bend and 74* elbow straight, at 0* abd: ER=52, IR=to stomach              Objective Measure: MMT at 0* abd  Details: flex=4+/5, ext= 5/5, abd=4-/5, ER=3+/5, IR=4/5  Objective Measure: SPADI  Details: 17.5% (13.33% disability at last progress note on 17; 39% at eval)              Outcome Measures (most recent score):  See SPADI above    Goals:Goal Identifier don deodorant   Goal Description Following PT interventions, pt will increase R shoulder abd AROM to 90* to be able to don deodorant with less difficulty   Target Date 17   Date Met  17 (met for function with elbow bent)   Progress:     Goal Identifier Reach high shelf   Goal Description Following PT interventions, pt will increase R shoulder flex AROM to 100* to be able to reach high shelf with less difficulty   Target Date 17   Date Met  17   Progress:     Goal Identifier push   Goal Description Following PT interventions, pt will increase R shoulder ER MMT at 0* abd to 4/5 grade to be able to push with less pain   Target Date 17   Date Met   (Pt met functionally, no pain pushing things forward)   Progress:     Goal Identifier wash head   Goal Description Following PT interventions, pt will score 20% on SPADI for MCID and less pain with washing head when using R shoulder   Target Date 17   Date Met   05/04/17   Progress:         Progress Toward Goals:   Progress this reporting period: Pt has been progressing steadily over the last few months from very aggressive shoulder surgeries. Pt has improved AROM, strength at 0* abd, function in R shoulder. Pt is limited by full AROM for reverse TSA, significant weakness at end range, RC strength at 0* abd. Pt is appropriate for 2x/mo skilled visits to continue progressing towards remaining initial goal.            Plan:  Continue therapy per current plan of care.    RECERTIFICATION    Cas Noriega  1945    Session Number: 16/22 /[facility] since start of care.    Reasons for Continuing Treatment:   See above    Frequency/Duration  2 times per month for 12 weeks for a total of 22 visits.    Recertification Period  8/3/17 - 11/3/17    Physician Signature:    Date:    X_______________________________________________________    Physician Name: EZ WALLIS MD    I certify the need for these services furnished under this plan of treatment and while under my care. Physician co-signature of this document indicates review and certification of the therapy plan.  This signature may be written on paper, or electronically signed within EPIC.       Thank You,    Carlton Graves, PT, DPT  Doctor of Physical Therapy  [facility]  [phone]

## 2017-08-03 NOTE — TELEPHONE ENCOUNTER
cefadroxil (DURICEF) 500 MG capsule      Last Written Prescription Date:  5/24/17  Last Fill Quantity: 180,   # refills: 0  Last Office Visit with [facility] or [facility] prescribing provider: 3/14/17  Future Office visit:       Routing refill request to provider for review/approval because:  Drug not on the [facility], [facility] or [facility] refill protocol or controlled substance

## 2017-08-07 ENCOUNTER — TELEPHONE (OUTPATIENT)
Dept: FAMILY MEDICINE | Facility: CLINIC | Age: 72
End: 2017-08-07

## 2017-08-07 NOTE — TELEPHONE ENCOUNTER
Orders for PT or in the folder to be signed by provider   And then to be given to  PT staff     Yesenia Jones  [facility] [place]

## 2017-08-22 ENCOUNTER — OFFICE VISIT (OUTPATIENT)
Dept: FAMILY MEDICINE | Facility: CLINIC | Age: 72
End: 2017-08-22
Payer: COMMERCIAL

## 2017-08-22 ENCOUNTER — RADIANT APPOINTMENT (OUTPATIENT)
Dept: GENERAL RADIOLOGY | Facility: CLINIC | Age: 72
End: 2017-08-22
Attending: FAMILY MEDICINE
Payer: COMMERCIAL

## 2017-08-22 VITALS
HEART RATE: 53 BPM | WEIGHT: 233.8 LBS | BODY MASS INDEX: 35.43 KG/M2 | OXYGEN SATURATION: 97 % | SYSTOLIC BLOOD PRESSURE: 126 MMHG | RESPIRATION RATE: 18 BRPM | DIASTOLIC BLOOD PRESSURE: 64 MMHG

## 2017-08-22 DIAGNOSIS — I95.1 ORTHOSTATIC HYPOTENSION: ICD-10-CM

## 2017-08-22 DIAGNOSIS — L57.0 AK (ACTINIC KERATOSIS): ICD-10-CM

## 2017-08-22 DIAGNOSIS — M25.511 CHRONIC RIGHT SHOULDER PAIN: Primary | ICD-10-CM

## 2017-08-22 DIAGNOSIS — G89.29 CHRONIC RIGHT SHOULDER PAIN: Primary | ICD-10-CM

## 2017-08-22 DIAGNOSIS — G89.29 CHRONIC RIGHT SHOULDER PAIN: ICD-10-CM

## 2017-08-22 DIAGNOSIS — M25.511 CHRONIC RIGHT SHOULDER PAIN: ICD-10-CM

## 2017-08-22 PROCEDURE — 17000 DESTRUCT PREMALG LESION: CPT | Performed by: FAMILY MEDICINE

## 2017-08-22 PROCEDURE — 99213 OFFICE O/P EST LOW 20 MIN: CPT | Mod: 25 | Performed by: FAMILY MEDICINE

## 2017-08-22 PROCEDURE — 73030 X-RAY EXAM OF SHOULDER: CPT | Mod: RT

## 2017-08-22 PROCEDURE — 17003 DESTRUCT PREMALG LES 2-14: CPT | Performed by: FAMILY MEDICINE

## 2017-08-22 ASSESSMENT — ANXIETY QUESTIONNAIRES
2. NOT BEING ABLE TO STOP OR CONTROL WORRYING: NOT AT ALL
3. WORRYING TOO MUCH ABOUT DIFFERENT THINGS: NOT AT ALL
6. BECOMING EASILY ANNOYED OR IRRITABLE: NOT AT ALL
1. FEELING NERVOUS, ANXIOUS, OR ON EDGE: NOT AT ALL
GAD7 TOTAL SCORE: 0
5. BEING SO RESTLESS THAT IT IS HARD TO SIT STILL: NOT AT ALL
7. FEELING AFRAID AS IF SOMETHING AWFUL MIGHT HAPPEN: NOT AT ALL

## 2017-08-22 ASSESSMENT — PATIENT HEALTH QUESTIONNAIRE - PHQ9
SUM OF ALL RESPONSES TO PHQ QUESTIONS 1-9: 0
5. POOR APPETITE OR OVEREATING: NOT AT ALL

## 2017-08-22 NOTE — MR AVS SNAPSHOT
After Visit Summary   8/22/2017    Cas Noriega    MRN: [id number]           Patient Information     Date Of Birth          1945        Visit Information        Provider Department      8/22/2017 9:40 AM Bryce Leigh MD [facility]        Today's Diagnoses     Chronic right shoulder pain    -  1    AK (actinic keratosis)        Orthostatic hypotension           Follow-ups after your visit        Your next 10 appointments already scheduled     Oct 18, 2017  1:00 PM CDT   Cochlear Implant Return with Vladislav Noble   [facility] ([facility] [place])    [address] [place] [id number] [phone]              Who to contact     If you have questions or need follow up information about today's clinic visit or your schedule please contact [facility] directly at [phone].  Normal or non-critical lab and imaging results will be communicated to you by MyChart, letter or phone within 4 business days after the clinic has received the results. If you do not hear from us within 7 days, please contact the clinic through [employer] or phone. If you have a critical or abnormal lab result, we will notify you by phone as soon as possible.  Submit refill requests through [employer] or call your pharmacy and they will forward the refill request to us. Please allow 3 business days for your refill to be completed.          Additional Information About Your Visit        MyChart Information     [employer] gives you secure access to your electronic health record. If you see a primary care provider, you can also send messages to your care team and make appointments. If you have questions, please call your primary care clinic.  If you do not have a primary care provider, please call [phone] and they will assist you.        Care EveryWhere ID     This is your Care EveryWhere ID. This could be used by other  organizations to access your [place] medical records  [id number]        Your Vitals Were     Pulse Respirations Pulse Oximetry BMI (Body Mass Index)          53 18 97% 35.43 kg/m2         Blood Pressure from Last 3 Encounters:   08/22/17 126/64   04/26/17 135/73   04/07/17 125/71    Weight from Last 3 Encounters:   08/22/17 233 lb 12.8 oz (106.1 kg)   04/26/17 236 lb (107 kg)   04/21/17 238 lb (108 kg)               Primary Care Provider Office Phone # Fax #    Xu Wells PA-C [phone] [address] [place] [id number]        Equal Access to Services     MARIA A ARANA : Hadii glory darbyo Somaira, waaxda luqadaha, qaybta kaalmada adenileshyada, rio vela. So [facility] [phone].    ATENCIÓN: Si habla español, tiene a holbrook disposición servicios gratuitos de asistencia lingüística. Llame al [phone].    We comply with applicable federal civil rights laws and [place] laws. We do not discriminate on the basis of race, color, national origin, age, disability sex, sexual orientation or gender identity.            Thank you!     Thank you for choosing [facility]  for your care. Our goal is always to provide you with excellent care. Hearing back from our patients is one way we can continue to improve our services. Please take a few minutes to complete the written survey that you may receive in the mail after your visit with us. Thank you!             Your Updated Medication List - Protect others around you: Learn how to safely use, store and throw away your medicines at www.disposemymeds.org.          This list is accurate as of: 8/22/17 10:14 AM.  Always use your most recent med list.                   Brand Name Dispense Instructions for use Diagnosis    ACETAMINOPHEN PO      Take 500 mg by mouth 2 every 6 hours, morning, noon and night. As Needed for shoulder.        albuterol 108 (90 BASE) MCG/ACT Inhaler    PROAIR HFA/PROVENTIL  HFA/VENTOLIN HFA    1 Inhaler    Inhale 2 puffs into the lungs every 6 hours as needed for shortness of breath / dyspnea or wheezing    Intermittent asthma, uncomplicated       aspirin 325 MG EC tablet      one daily    Hyperlipidemia LDL goal < 70, Acute myocardial infarction of other inferior wall, episode of care unspecified       atenolol 50 MG tablet    TENORMIN    90 tablet    Take 1 tablet (50 mg) by mouth daily    Essential hypertension with goal blood pressure less than 140/90       atorvastatin 80 MG tablet    LIPITOR    90 tablet    Take 1 tablet (80 mg) by mouth daily    Hyperlipidemia with target LDL less than 70       carbidopa-levodopa  MG per tablet    SINEMET    180 tablet    Take 2 tablets by mouth daily At bedtime.  Written as 90 day supply.    Restless legs syndrome (RLS)       * cefadroxil 500 MG capsule    DURICEF    180 capsule    Take 1 capsule (500 mg) by mouth 2 times daily    Prosthetic joint infection, sequela       * cefadroxil 500 MG capsule    DURICEF    180 capsule    TAKE ONE CAPSULE BY MOUTH TWICE A DAY    Prosthetic joint infection, sequela       fluorouracil 5 % cream    EFUDEX    40 g    Apply topically 2 times daily    Actinic keratosis       GLUCOSAMINE CHONDR 1500 COMPLX PO      glucosamine(1500mg)plus chondroitin(1200mg) takes 1 tablet daily        hydrochlorothiazide 25 MG tablet    HYDRODIURIL    90 tablet    Take 1 tablet (25 mg) by mouth every morning    Essential hypertension with goal blood pressure less than 140/90       lactobacillus rhamnosus (GG) capsule     90 capsule    Take 1 capsule by mouth daily    Encounter for long-term (current) use of antibiotics       lisinopril 40 MG tablet    PRINIVIL/ZESTRIL    90 tablet    Take 1 tablet (40 mg) by mouth daily    Essential hypertension with goal blood pressure less than 140/90       MUCINEX 600 MG 12 hr tablet   Generic drug:  guaiFENesin      Take 1,200 mg by mouth 2 times daily        nitroGLYcerin 0.4 MG  sublingual tablet    NITROSTAT    30 tablet    Place 1 tablet (0.4 mg) under the tongue every 5 minutes as needed for chest pain    Coronary artery disease involving native heart with angina pectoris, unspecified vessel or lesion type (H)       OMEGA-3 FISH OIL PO      Take by mouth 2 times daily (with meals) 1200mg/360mg        order for DME      Equipment being ordered: CPAP Cas Noriega received a [employer] AirSense 10 CPAP. Pressures were set at Auto 11 - 15 cm H2O.        order for DME     1 Device    one digital automatic Home blood pressure machine ( per insurance)    Unspecified essential hypertension       traZODone 100 MG tablet    DESYREL    90 tablet    Take 1 tablet (100 mg) by mouth At Bedtime    Insomnia, unspecified type       * Notice:  This list has 2 medication(s) that are the same as other medications prescribed for you. Read the directions carefully, and ask your doctor or other care provider to review them with you.

## 2017-08-22 NOTE — PROGRESS NOTES
SUBJECTIVE:   Cas Noriega is a 72 year old male who presents to clinic today for the following health issues:  Dizziness  He continues to have some positional dizzyness.  Has questions about skin lesions on arm and here for [place] follow up of shoulder repair.  Needs xray      Duration: 1 month    Description   Feeling faint:  no   Feeling like the surroundings are moving: no   Loss of consciousness or falls: no     Intensity:  mild    Accompanying signs and symptoms:   Nausea/vomitting: no   Palpitations: no   Weakness in arms or legs: no   Vision or speech changes: no   Ringing in ears (Tinnitus): no   Hearing loss related to dizziness: no   Other (fevers/chills/sweating/dyspnea): no     History (similar episodes/head trauma/previous evaluation/recent bleeding): None    Precipitating or alleviating factors (new meds/chemicals): None  Worse with activity/head movement: no     Therapies tried and outcome: None        Derm check      Duration: 12 days for 2 spots on arm, and hand right years    Description (location/character/radiation): small discoloration, started as welt    Intensity:  mild    Accompanying signs and symptoms: itching in beginning and full of liquid    History (similar episodes/previous evaluation): exposure to railroad ties    Precipitating or alleviating factors: None    Therapies tried and outcome: on hand has had froze     Right shoulder      Duration: had infection in right shoulder     Description (location/character/radiation):  Went to [place] for replacement again    Intensity:  moderate    Accompanying signs and symptoms: 0    History (similar episodes/previous evaluation): per [place] needs xray    Precipitating or alleviating factors: None    Therapies tried and outcome: [place]             Problem list and histories reviewed & adjusted, as indicated.  Additional history: as documented    Patient Active Problem List   Diagnosis     Hypertension goal BP (blood pressure) < 140/90     Acute  myocardial infarction of other inferior wall, episode of care unspecified     Hyperlipidemia with target LDL less than 70     Restless legs syndrome (RLS)     Insomnia     Generalized anxiety disorder     Mild major depression (H)     Lumbago     Rotator cuff disorder     Intermittent asthma     Advance Care Planning     Senile nuclear sclerosis     Sensorineural hearing loss, bilateral     S/P shoulder joint replacement     CAD (coronary artery disease)     SEVERE MARTY (obstructive sleep apnea)     Infection of prosthetic shoulder joint (H)     Cochlear implant in place     Encounter for long-term (current) use of antibiotics     Past Surgical History:   Procedure Laterality Date     ARTHROPLASTY SHOULDER  2/26/2013    Procedure: ARTHROPLASTY SHOULDER;  Left Total Shoulder Arthropasty;  Surgeon: Xu Snell MD;  Location: [facility]     CHOLECYSTECTOMY, LAPOROSCOPIC  3/2007    Cholecystectomy, Laparoscopic     COLONOSCOPY  8/2009    Benign polyp     COLONOSCOPY N/A 3/25/2015    Procedure: COLONOSCOPY;  Surgeon: Deejay Marie MD;  Location: [facility] GI     IMPLANT COCHLEA WITH NERVE INTEGRITY MONITOR Right 12/17/2015    Procedure: IMPLANT COCHLEA WITH NERVE INTEGRITY MONITOR;  Surgeon: Mickey Turpin MD;  Location:  OR     IMPLANT COCHLEA WITH NERVE INTEGRITY MONITOR Left 3/27/2017    Procedure: IMPLANT COCHLEA WITH NERVE INTEGRITY MONITOR;  Surgeon: Mickey Turpin MD;  Location:  OR     PHACOEMULSIFICATION WITH STANDARD INTRAOCULAR LENS IMPLANT  6/4/2012    Procedure:PHACOEMULSIFICATION WITH STANDARD INTRAOCULAR LENS IMPLANT; Left kelman phacoemulsification with intraocular lens implant; Surgeon:DAYDAY HILL; Location:[facility]     PHACOEMULSIFICATION WITH STANDARD INTRAOCULAR LENS IMPLANT  7/26/2012    Procedure: PHACOEMULSIFICATION WITH STANDARD INTRAOCULAR LENS IMPLANT;  Right Kelman phacoemulsification with intraocular lens implant;  Surgeon: Dayday Hill MD;  Location: [facility]      RELEASE CARPAL TUNNEL  10/23/2012    Procedure: RELEASE CARPAL TUNNEL;  Right carpal tunnel release;  Surgeon: Xu Snell MD;  Location: [facility]     RELEASE CARPAL TUNNEL  11/9/2012    Procedure: RELEASE CARPAL TUNNEL;  Left carpal tunnel release;  Surgeon: Xu Snell MD;  Location: [facility]     REMOVE HARDWARE ARTHROPLASTY SHOULDER  5/2/15    attempted and ended up doing bacteria removal     SURGICAL HISTORY OF -   3/1985    Bilateral Inguinal Herniorrhaphy     SURGICAL HISTORY OF -   5/1997    Breast Lumpectomy-Left-Benign     SURGICAL HISTORY OF -   6/10/2005    Rotator Cuff Repair     SURGICAL HISTORY OF -   10/2005    L5-S1 decompression and fusion with intrumentation     SURGICAL HISTORY OF -   2005    Right Shoulder Arthroplasty     SURGICAL HISTORY OF -   2006    PTCA with stent RCA     SURGICAL HISTORY OF -       Coronary Artery Bypass Graft       Social History   Substance Use Topics     Smoking status: Former Smoker     Packs/day: 1.00     Years: 40.00     Types: Cigarettes     Start date: 8/8/1956     Quit date: 1/1/1998     Smokeless tobacco: Never Used     Alcohol use 0.0 oz/week      Comment: 2 glasses a wine/2 cans a beer a month     Family History   Problem Relation Age of Onset     Hypertension Mother      CANCER Father      bone cancer         Current Outpatient Prescriptions   Medication Sig Dispense Refill     cefadroxil (DURICEF) 500 MG capsule TAKE ONE CAPSULE BY MOUTH TWICE A  capsule 0     traZODone (DESYREL) 100 MG tablet Take 1 tablet (100 mg) by mouth At Bedtime 90 tablet 1     hydrochlorothiazide (HYDRODIURIL) 25 MG tablet Take 1 tablet (25 mg) by mouth every morning 90 tablet 1     atenolol (TENORMIN) 50 MG tablet Take 1 tablet (50 mg) by mouth daily 90 tablet 1     lisinopril (PRINIVIL/ZESTRIL) 40 MG tablet Take 1 tablet (40 mg) by mouth daily 90 tablet 1     carbidopa-levodopa (SINEMET)  MG per tablet Take 2 tablets by mouth daily At bedtime.  Written as 90  day supply. 180 tablet 1     atorvastatin (LIPITOR) 80 MG tablet Take 1 tablet (80 mg) by mouth daily 90 tablet 3     cefadroxil (DURICEF) 500 MG capsule Take 1 capsule (500 mg) by mouth 2 times daily 180 capsule 0     guaiFENesin (MUCINEX) 600 MG 12 hr tablet Take 1,200 mg by mouth 2 times daily       lactobacillus rhamnosus, GG, (CULTURELL) capsule Take 1 capsule by mouth daily 90 capsule 11     nitroglycerin (NITROSTAT) 0.4 MG SL tablet Place 1 tablet (0.4 mg) under the tongue every 5 minutes as needed for chest pain 30 tablet 12     albuterol (PROAIR HFA, PROVENTIL HFA, VENTOLIN HFA) 108 (90 BASE) MCG/ACT inhaler Inhale 2 puffs into the lungs every 6 hours as needed for shortness of breath / dyspnea or wheezing 1 Inhaler 11     fluorouracil (EFUDEX) 5 % cream Apply topically 2 times daily 40 g 3     order for DME Equipment being ordered: CPAP Cas Noriega received a [employer] AirSense 10 CPAP. Pressures were set at Auto 11 - 15 cm H2O.       ACETAMINOPHEN PO Take 500 mg by mouth 2 every 6 hours, morning, noon and night. As Needed for shoulder.       Omega-3 Fatty Acids (OMEGA-3 FISH OIL PO) Take by mouth 2 times daily (with meals) 1200mg/360mg       ASPIRIN 325 MG PO TBEC one daily  0     ORDER FOR DME one digital automatic Home blood pressure machine ( per insurance) 1 Device 0     GLUCOSAMINE CHONDR 1500 COMPLX OR glucosamine(1500mg)plus chondroitin(1200mg) takes 1 tablet daily           Reviewed and updated as needed this visit by clinical staff     Reviewed and updated as needed this visit by Provider         ROS:  C: NEGATIVE for fever, chills, change in weight  E/M: NEGATIVE for ear, mouth and throat problems  R: NEGATIVE for significant cough or SOB  CV: NEGATIVE for chest pain, palpitations or peripheral edema    OBJECTIVE:     /64 (BP Location: Right arm, Patient Position: Chair, Cuff Size: Adult Regular)  Pulse 53  Resp 18  Wt 233 lb 12.8 oz (106.1 kg)  SpO2 97%  BMI 35.43 kg/m2  Body  mass index is 35.43 kg/(m^2).  GENERAL: healthy, alert and no distress  NECK: no adenopathy, no asymmetry, masses, or scars and thyroid normal to palpation  RESP: lungs clear to auscultation - no rales, rhonchi or wheezes  CV: regular rate and rhythm, normal S1 S2, no S3 or S4, no murmur, click or rub, no peripheral edema and peripheral pulses strong  ABDOMEN: soft, nontender, no hepatosplenomegaly, no masses and bowel sounds normal  MS: he does have limitation of motion of right shoulder  SKIN  Three lesions of AK removed with liquid nitrogen         ASSESSMENT/PLAN:     ASSESSMENT/PLAN:      ICD-10-CM    1. Chronic right shoulder pain M25.511 XR Shoulder Right 2 Views    G89.29    2. AK (actinic keratosis) L57.0    3. Orthostatic hypotension I95.1                    Bryce Leigh MD  [facility]

## 2017-08-22 NOTE — NURSING NOTE
"Chief Complaint   Patient presents with     Derm Problem     Dizziness     Shoulder       Initial /64 (BP Location: Right arm, Patient Position: Chair, Cuff Size: Adult Regular)  Pulse 53  Resp 18  Wt 233 lb 12.8 oz (106.1 kg)  SpO2 97%  BMI 35.43 kg/m2 Estimated body mass index is 35.43 kg/(m^2) as calculated from the following:    Height as of 4/21/17: 5' 8.11\" (1.73 m).    Weight as of this encounter: 233 lb 12.8 oz (106.1 kg).  Medication Reconciliation: complete      Health Maintenance that is potentially due pending provider review:  PHQ9 and ACT    Possibly completing today per provider review.  "

## 2017-08-23 ASSESSMENT — ASTHMA QUESTIONNAIRES: ACT_TOTALSCORE: 22

## 2017-08-23 ASSESSMENT — ANXIETY QUESTIONNAIRES: GAD7 TOTAL SCORE: 0

## 2017-08-30 ENCOUNTER — HOSPITAL ENCOUNTER (OUTPATIENT)
Dept: PHYSICAL THERAPY | Facility: CLINIC | Age: 72
Setting detail: THERAPIES SERIES
End: 2017-08-30
Attending: FAMILY MEDICINE
Payer: MEDICARE

## 2017-08-30 PROCEDURE — 97140 MANUAL THERAPY 1/> REGIONS: CPT | Mod: GP

## 2017-08-30 PROCEDURE — 40000718 ZZHC STATISTIC PT DEPARTMENT ORTHO VISIT

## 2017-08-30 PROCEDURE — 97110 THERAPEUTIC EXERCISES: CPT | Mod: GP

## 2017-09-13 ENCOUNTER — HOSPITAL ENCOUNTER (OUTPATIENT)
Dept: PHYSICAL THERAPY | Facility: CLINIC | Age: 72
Setting detail: THERAPIES SERIES
End: 2017-09-13
Attending: FAMILY MEDICINE
Payer: MEDICARE

## 2017-09-13 PROCEDURE — 97110 THERAPEUTIC EXERCISES: CPT | Mod: GP

## 2017-09-13 PROCEDURE — 40000718 ZZHC STATISTIC PT DEPARTMENT ORTHO VISIT

## 2017-09-13 PROCEDURE — 97140 MANUAL THERAPY 1/> REGIONS: CPT | Mod: GP

## 2017-10-04 ENCOUNTER — HOSPITAL ENCOUNTER (OUTPATIENT)
Dept: PHYSICAL THERAPY | Facility: CLINIC | Age: 72
Setting detail: THERAPIES SERIES
End: 2017-10-04
Attending: FAMILY MEDICINE
Payer: MEDICARE

## 2017-10-04 PROCEDURE — 40000718 ZZHC STATISTIC PT DEPARTMENT ORTHO VISIT

## 2017-10-04 PROCEDURE — 97110 THERAPEUTIC EXERCISES: CPT | Mod: GP

## 2017-10-04 PROCEDURE — 97140 MANUAL THERAPY 1/> REGIONS: CPT | Mod: GP

## 2017-10-11 ENCOUNTER — HOSPITAL ENCOUNTER (OUTPATIENT)
Dept: PHYSICAL THERAPY | Facility: CLINIC | Age: 72
Setting detail: THERAPIES SERIES
End: 2017-10-11
Attending: FAMILY MEDICINE
Payer: MEDICARE

## 2017-10-11 PROCEDURE — G8986 CARRY D/C STATUS: HCPCS | Mod: GP,CI

## 2017-10-11 PROCEDURE — 40000718 ZZHC STATISTIC PT DEPARTMENT ORTHO VISIT

## 2017-10-11 PROCEDURE — G8985 CARRY GOAL STATUS: HCPCS | Mod: GP,CJ

## 2017-10-11 PROCEDURE — 97110 THERAPEUTIC EXERCISES: CPT | Mod: GP

## 2017-10-11 NOTE — DISCHARGE SUMMARY
Outpatient Physical Therapy Discharge Note     Patient: Cas Noriega  : 1945    Beginning/End Dates of Reporting Period:  17 to 10/11/2017    Referring Provider: Bryce Leigh MD     Therapy Diagnosis: Chronic R shoulder pain     Client Self Report: Pt ready for DC today, accidently came in a week early and is ok with that. Pt states improvement from starting skilled PT at 85%.     Objective Measurements:  Objective Measure: R shoulder AROM  Details: flex=95*, abd=90* elbow straight, at 0* abd: ER=53, IR=to stomach         Objective Measure: R shoulder MMT at 0* abd  Details: flex=4+/5, ext= 5/5, abd=4/5, ER=4/5, IR=4+/5  Objective Measure: SPADI  Details: 4.2% (17.5% disability at last progress note on 17; 39% at eval)        Outcome Measures (most recent score):  See SPADI above    Goals:  Goal Identifier don deodorant   Goal Description Following PT interventions, pt will increase R shoulder abd AROM to 90* to be able to don deodorant with less difficulty   Target Date 17   Date Met  17   Progress:     Goal Identifier Reach high shelf   Goal Description Following PT interventions, pt will increase R shoulder flex AROM to 100* to be able to reach high shelf with less difficulty   Target Date 17   Date Met  17   Progress:     Goal Identifier push   Goal Description Following PT interventions, pt will increase R shoulder ER MMT at 0* abd to 4/5 grade to be able to push with less pain   Target Date 17   Date Met  10/11/17   Progress:     Goal Identifier wash head   Goal Description Following PT interventions, pt will score 20% on SPADI for MCID and less pain with washing head when using R shoulder   Target Date 17   Date Met  17   Progress:         Progress Toward Goals:   Progress this reporting period: Pt has progressed slowly yet productively to meet all goals, increase strength/ROM/function in R shoulder, less pain. Pt will not get  full ROM per R reverse TSA, however functional use of R shoulder has been achieved. Pt is appropriate for discharge from skilled PT at this time.            Plan:  Discharge from therapy.    Discharge:    Reason for Discharge: Patient has met all goals.    Equipment Issued: t-bands    Discharge Plan: Patient to continue home program.

## 2017-10-18 ENCOUNTER — OFFICE VISIT (OUTPATIENT)
Dept: AUDIOLOGY | Facility: CLINIC | Age: 72
End: 2017-10-18

## 2017-10-18 DIAGNOSIS — H90.3 SENSORINEURAL HEARING LOSS, BILATERAL: Primary | ICD-10-CM

## 2017-10-18 NOTE — MR AVS SNAPSHOT
After Visit Summary   10/18/2017    Cas Noriega    MRN: [id number]           Patient Information     Date Of Birth          1945        Visit Information        Provider Department      10/18/2017 1:00 PM Macrina Barriga, Vladislav ZACARIAS [facility] Audiology        Today's Diagnoses     Sensorineural hearing loss, bilateral    -  1       Follow-ups after your visit        Who to contact     Please call your clinic at [phone] to:    Ask questions about your health    Make or cancel appointments    Discuss your medicines    Learn about your test results    Speak to your doctor   If you have compliments or concerns about an experience at your clinic, or if you wish to file a complaint, please contact [facility] Patient Relations at [phone] or email us at [email or web address]         Additional Information About Your Visit        MyChart Information     [employer] gives you secure access to your electronic health record. If you see a primary care provider, you can also send messages to your care team and make appointments. If you have questions, please call your primary care clinic.  If you do not have a primary care provider, please call [phone] and they will assist you.      V[employer] is an electronic gateway that provides easy, online access to your medical records. With [employer], you can request a clinic appointment, read your test results, renew a prescription or communicate with your care team.     To access your existing account, please contact your [facility] or call [phone] for assistance.        Care EveryWhere ID     This is your Care EveryWhere ID. This could be used by other organizations to access your [facility] medical records  [id number]         Blood Pressure from Last 3 Encounters:   08/22/17 126/64   04/26/17 135/73   04/07/17 125/71    Weight from Last 3 Encounters:   08/22/17 106.1 kg (233 lb 12.8 oz)    04/26/17 107 kg (236 lb)   04/21/17 108 kg (238 lb)              We Performed the Following     AUDIOGRAM/TYMPANOGRAM - INTERFACE     Eval of Aud Rehab Status (60 min)   (93691)     LT: Diagnostic Analysis of CI 7 yrs & over, Subsequent Programming   (89408)     RT: Diagnostic Analysis of CI 7 yrs & over, Subsequent Programming   (44149)     Tympanometry (impedance - testing) (41773)        Primary Care Provider Office Phone # Fax #    Xu Wells PA-C [phone] [address] [place] [address]        Equal Access to Services     [facility]: Hadii aad ku hadasho Soomaali, waaxda luqadaha, qaybta kaalmada adeegyada, waxjamie boothein hayaan adeeg argenis steel . So [facility] [phone].    ATENCIÓN: Si habla español, tiene a holbrook disposición servicios gratuitos de asistencia lingüística. [facility] [phone].    We comply with applicable federal civil rights laws and [place] laws. We do not discriminate on the basis of race, color, national origin, age, disability, sex, sexual orientation, or gender identity.            Thank you!     Thank you for choosing [facility] AUDIOLOGY  for your care. Our goal is always to provide you with excellent care. Hearing back from our patients is one way we can continue to improve our services. Please take a few minutes to complete the written survey that you may receive in the mail after your visit with us. Thank you!             Your Updated Medication List - Protect others around you: Learn how to safely use, store and throw away your medicines at www.disposemymeds.org.          This list is accurate as of: 10/18/17 11:59 PM.  Always use your most recent med list.                   Brand Name Dispense Instructions for use Diagnosis    ACETAMINOPHEN PO      Take 500 mg by mouth 2 every 6 hours, morning, noon and night. As Needed for shoulder.        albuterol 108 (90 BASE) MCG/ACT Inhaler    PROAIR HFA/PROVENTIL HFA/VENTOLIN HFA    1 Inhaler    Inhale 2  puffs into the lungs every 6 hours as needed for shortness of breath / dyspnea or wheezing    Intermittent asthma, uncomplicated       aspirin 325 MG EC tablet      one daily    Hyperlipidemia LDL goal < 70, Acute myocardial infarction of other inferior wall, episode of care unspecified       atenolol 50 MG tablet    TENORMIN    90 tablet    Take 1 tablet (50 mg) by mouth daily    Essential hypertension with goal blood pressure less than 140/90       atorvastatin 80 MG tablet    LIPITOR    90 tablet    Take 1 tablet (80 mg) by mouth daily    Hyperlipidemia with target LDL less than 70       carbidopa-levodopa  MG per tablet    SINEMET    180 tablet    Take 2 tablets by mouth daily At bedtime.  Written as 90 day supply.    Restless legs syndrome (RLS)       * cefadroxil 500 MG capsule    DURICEF    180 capsule    Take 1 capsule (500 mg) by mouth 2 times daily    Prosthetic joint infection, sequela       * cefadroxil 500 MG capsule    DURICEF    180 capsule    TAKE ONE CAPSULE BY MOUTH TWICE A DAY    Prosthetic joint infection, sequela       fluorouracil 5 % cream    EFUDEX    40 g    Apply topically 2 times daily    Actinic keratosis       GLUCOSAMINE CHONDR 1500 COMPLX PO      glucosamine(1500mg)plus chondroitin(1200mg) takes 1 tablet daily        hydrochlorothiazide 25 MG tablet    HYDRODIURIL    90 tablet    Take 1 tablet (25 mg) by mouth every morning    Essential hypertension with goal blood pressure less than 140/90       lactobacillus rhamnosus (GG) capsule     90 capsule    Take 1 capsule by mouth daily    Encounter for long-term (current) use of antibiotics       lisinopril 40 MG tablet    PRINIVIL/ZESTRIL    90 tablet    Take 1 tablet (40 mg) by mouth daily    Essential hypertension with goal blood pressure less than 140/90       MUCINEX 600 MG 12 hr tablet   Generic drug:  guaiFENesin      Take 1,200 mg by mouth 2 times daily        nitroGLYcerin 0.4 MG sublingual tablet    NITROSTAT    30 tablet     Place 1 tablet (0.4 mg) under the tongue every 5 minutes as needed for chest pain    Coronary artery disease involving native heart with angina pectoris, unspecified vessel or lesion type (H)       OMEGA-3 FISH OIL PO      Take by mouth 2 times daily (with meals) 1200mg/360mg        order for DME      Equipment being ordered: CPAP Cas Noriega received a [employer] AirSense 10 CPAP. Pressures were set at Auto 11 - 15 cm H2O.        order for DME     1 Device    one digital automatic Home blood pressure machine ( per insurance)    Unspecified essential hypertension       traZODone 100 MG tablet    DESYREL    90 tablet    Take 1 tablet (100 mg) by mouth At Bedtime    Insomnia, unspecified type       * Notice:  This list has 2 medication(s) that are the same as other medications prescribed for you. Read the directions carefully, and ask your doctor or other care provider to review them with you.

## 2017-10-20 NOTE — PROGRESS NOTES
AUDIOLOGY REPORT    BACKGROUND INFORMATION: Dr. Mickey Turpin implanted Cas Noriega with a right  Advanced Dotted Blocks HiRes 90k Advantage midscala MS cochlear implant (CI) on 12/17/15 and a left HiRes 90k Ultra CI on 3/27/17 due to bilateral severe to profound sensorineural hearing loss and lack of benefit from hearing aids.  The patient is being seen for speech perception testing and programming on 10/18/17 in Audiology at the [facility]    PATIENT REPORT: Patient reported that he has had continued issue with left intermittency. He also reported that the volume is much too loud in both and he turns is down 5 on both processo    METHOD: Speech perception testing is conducted at regular intervals to determine the degree of benefit the patient is obtaining from the cochlear implant. Tests are conducted using the cochlear implant without the benefit of lipreading. All tests are conducted in a sound-treated room. Perception of monosyllabic words and words in sentences are tested. Results usually show improvement over time. A decrease in performance indicates the need for re-programming of the prosthesis and/or replacement of components.     INTERVAL: 21 months post activation of right CI, 6 months post activation of left CI    TEST RESULTS:  35 minutes were spent assessing the patient s auditory rehabilitation status.    Device(s) used for Testing:   Right ear: Lydia Q90 CI  Left ear: Lydia Q90 CI    Soundfield Aided Thresholds: Aided thresholds in the borderline normal to mild hearing loss range with both CIs.  Unaided Thresholds: Not tested today  Tympanograms: Restricted bilaterally    CNC Words Test:  The patient repeats 25 single syllable words, auditory only. The words are presented at 60 dB SPL (conversational level) delivered from a CD player.    Preoperative Performance:  Left ear aided: words= 0%, phonemes= 6%  Right ear aided: words= 0%, phonemes= 6%    3  months Post-Activation of CI: (4/15/16)  Left: Not tested  Right: words- 32%, phonemes= 55%    10 months Post Activation of CI (11/11/16)  Left ear aided: words= 0%, phonemes= 35%  Right CI: words= 9%, phonemes= 23%    1.5 year post activation of right CI, 3 months post activation of left CI (7/19/17)  Left CI: words= 36%, phonemes= 68%  Right CI: words= 8%, phonemes= 24%      21 months post activation of right CI, 6 months post activation of left CI (today)  Left CI: words= 44%, phonemes= 61%  Right CI: words= 20%, phonemes= 39%    [employer] Sentences Test:  The patient repeats 20 sentences, auditory only.  The sentences are presented at 60 dB SPL (conversational level) delivered from a CD player.     Preoperative Performance:  Left ear aided: 10%  Right ear aided: 3%  Bilaterally aided: 26%    3 months Post-Activation of CI: (4/15/16)  Left: 6%  Right: 31%  Bilateral/Bimodal: 46%    10 months Post-Activation of CI (11/11/16):  Left: 18%  Right: 32%  Bilateral/Bimodal: 74%, +10 dB SNR: 55%    1.5 year post activation of right CI, 3 months post activation of left CI (7/19/17)  Left: 80%  Right: 25%  Bilateral/Bimodal: 76%, +10 dB SNR: 32%    21 months post activation of right CI, 6 months post activation of left CI (today)  Left: 55%  Right: 27%  Bilateral/Bimodal: 59      FITTING SESSION: Dr. Mickey Turpin, cochlear implant surgeon, ordered today's appointment. The patient came to the clinic for adjustment to the programs in the external speech processor and for assessment of the external components of the cochlear implant system. These components provide power and data to the internal device. Sound is only heard once the external portion is activated. Postoperative treatment, including device fitting and adjustment, audiologic assessments, and training are required at regular intervals. Testing can include electropsychophysical measures of threshold, comfort, and loudness balancing, which are completed to update the  program.    Processor type: Lydia CI Q90  Headpiece type: UHP  Magnet strength: Right: 3, Left: 4    TEST RESULTS:   Electrode Impedances: Left: Stable and within tolerances.   Neural Response Testing: Not tested today  Facial Stimulation: Absent  Tinnitus: Present  Balance Problems: Absent  Pain/Discomfort: Absent  Strategies Tried: Optima P, Optima S  Strategy Preference: Optima S left, Optima P right    Programs: Left  1. Measured M's: Omnidirectional  2. Ultra Zoom    Programs: Right:  1. Omnidirectional  2. Ultra Zoom      Number of Channels per Program: 16 (electrode 7 enabled today due to change in impedance).    COMMENTS:  Speech perception scores have decreased for [employer] since last visit.    M levels were measured using loudness scaling. Overall M levels decreased significantly across the array in both ears. He reported comfort with the volume and clarity was okay.    During mapping the left implant lost lock multiple times. He reported that when he received his RMAed UHP that it did not have enough magnets and did not know how to add another. I added a thin magnet to the outside and it successfully held lock. Magnet strength was still appropriate.     SUMMARY AND RECOMMENDATIONS: Cas was seen for bilateral cochlear implant programming today and. He will return in 6 months or sooner if concerns arise. Please call this clinic with questions regarding these results or recommendations.    Peña Childress  Licensed Audiologist  [place] License #[id number]

## 2017-11-17 ENCOUNTER — ALLIED HEALTH/NURSE VISIT (OUTPATIENT)
Dept: FAMILY MEDICINE | Facility: CLINIC | Age: 72
End: 2017-11-17
Payer: COMMERCIAL

## 2017-11-17 DIAGNOSIS — T84.50XS PROSTHETIC JOINT INFECTION, SEQUELA: ICD-10-CM

## 2017-11-17 DIAGNOSIS — I10 ESSENTIAL HYPERTENSION WITH GOAL BLOOD PRESSURE LESS THAN 140/90: ICD-10-CM

## 2017-11-17 DIAGNOSIS — G25.81 RESTLESS LEGS SYNDROME (RLS): ICD-10-CM

## 2017-11-17 DIAGNOSIS — G47.00 INSOMNIA, UNSPECIFIED TYPE: ICD-10-CM

## 2017-11-17 DIAGNOSIS — E78.5 HYPERLIPIDEMIA WITH TARGET LDL LESS THAN 70: ICD-10-CM

## 2017-11-17 PROCEDURE — 99207 ZZC NO CHARGE NURSE ONLY: CPT

## 2017-11-17 RX ORDER — ATORVASTATIN CALCIUM 80 MG/1
80 TABLET, FILM COATED ORAL DAILY
Qty: 90 TABLET | Refills: 1 | Status: SHIPPED | OUTPATIENT
Start: 2017-11-17 | End: 2018-04-26

## 2017-11-17 RX ORDER — LISINOPRIL 40 MG/1
40 TABLET ORAL DAILY
Qty: 90 TABLET | Refills: 1 | Status: SHIPPED | OUTPATIENT
Start: 2017-11-17 | End: 2018-04-26

## 2017-11-17 RX ORDER — ATENOLOL 50 MG/1
50 TABLET ORAL DAILY
Qty: 90 TABLET | Refills: 1 | Status: SHIPPED | OUTPATIENT
Start: 2017-11-17 | End: 2018-04-26

## 2017-11-17 RX ORDER — TRAZODONE HYDROCHLORIDE 100 MG/1
100 TABLET ORAL AT BEDTIME
Qty: 90 TABLET | Refills: 1 | Status: SHIPPED | OUTPATIENT
Start: 2017-11-17 | End: 2018-04-26

## 2017-11-17 RX ORDER — CARBIDOPA AND LEVODOPA 25; 100 MG/1; MG/1
2 TABLET ORAL DAILY
Qty: 180 TABLET | Refills: 1 | Status: SHIPPED | OUTPATIENT
Start: 2017-11-17 | End: 2018-04-26

## 2017-11-17 RX ORDER — CEFADROXIL 500 MG/1
500 CAPSULE ORAL 2 TIMES DAILY
Qty: 180 CAPSULE | Refills: 0 | Status: SHIPPED | OUTPATIENT
Start: 2017-11-17 | End: 2017-11-20

## 2017-11-17 RX ORDER — HYDROCHLOROTHIAZIDE 25 MG/1
25 TABLET ORAL EVERY MORNING
Qty: 90 TABLET | Refills: 1 | Status: SHIPPED | OUTPATIENT
Start: 2017-11-17 | End: 2018-04-26

## 2017-11-17 NOTE — NURSING NOTE
Pt walked into clinic wanting 90 days refill request on his medications. Also needing an appointment with Sera-States he would like to switch to her for primary so he and his spouse have the same provider. Pt would like to review medications. Also will come fasting to complete pended lab work. Leilani Marshall RN

## 2017-11-17 NOTE — MR AVS SNAPSHOT
After Visit Summary   11/17/2017    Cas Noriega    MRN: [id number]           Patient Information     Date Of Birth          1945        Visit Information        Provider Department      11/17/2017 9:45 AM FL NB RN [facility]        Today's Diagnoses     Essential hypertension with goal blood pressure less than 140/90        Insomnia, unspecified type        Restless legs syndrome (RLS)        Hyperlipidemia with target LDL less than 70        Prosthetic joint infection, sequela           Follow-ups after your visit        Your next 10 appointments already scheduled     Nov 20, 2017  8:00 AM CST   Office Visit with Sera Garcia PA-C   [facility] ([facility])    [address] [place] [phone]   [phone]           Bring a current list of meds and any records pertaining to this visit. For Physicals, please bring immunization records and any forms needing to be filled out. Please arrive 10 minutes early to complete paperwork.              Who to contact     If you have questions or need follow up information about today's clinic visit or your schedule please contact [facility] directly at [phone].  Normal or non-critical lab and imaging results will be communicated to you by Kerlinkhart, letter or phone within 4 business days after the clinic has received the results. If you do not hear from us within 7 days, please contact the clinic through Doximityt or phone. If you have a critical or abnormal lab result, we will notify you by phone as soon as possible.  Submit refill requests through [employer] or call your pharmacy and they will forward the refill request to us. Please allow 3 business days for your refill to be completed.          Additional Information About Your Visit        Kerlinkhar[employer] Park Pro Information     [employer] gives you secure access to your electronic health record. If you see a primary care  provider, you can also send messages to your care team and make appointments. If you have questions, please call your primary care clinic.  If you do not have a primary care provider, please call [phone] and they will assist you.        Care EveryWhere ID     This is your Care EveryWhere ID. This could be used by other organizations to access your [facility] medical records  [id number]         Blood Pressure from Last 3 Encounters:   08/22/17 126/64   04/26/17 135/73   04/07/17 125/71    Weight from Last 3 Encounters:   08/22/17 233 lb 12.8 oz (106.1 kg)   04/26/17 236 lb (107 kg)   04/21/17 238 lb (108 kg)              Today, you had the following     No orders found for display         Where to get your medicines      These medications were sent to [facility] [place] [address]     Phone:  [phone]     atenolol 50 MG tablet    atorvastatin 80 MG tablet    carbidopa-levodopa  MG per tablet    cefadroxil 500 MG capsule    hydrochlorothiazide 25 MG tablet    lisinopril 40 MG tablet    traZODone 100 MG tablet          Primary Care Provider Office Phone # Fax #    Xu Wells PA-C [phone]       [address] [place], [place] [id number]        Equal Access to Services     [facility]IA [facility]RANA [facility]: Hadii glory ku hadasho Sokirstinali, waaxda luqadaha, qaybta kaalmada adeegyada, rio vela. So [facility] [phone].    ATENCIÓN: Si habla español, tiene a [place] disposición servicios gratuitos de asistencia lingüística. Idris al [phone].    We comply with applicable federal civil rights laws and [place] laws. We do not discriminate on the basis of race, color, national origin, age, disability, sex, sexual orientation, or gender identity.            Thank you!     Thank you for choosing [facility]  for your care. Our goal is always to provide you with excellent care. Hearing back  from our patients is one way we can continue to improve our services. Please take a few minutes to complete the written survey that you may receive in the mail after your visit with us. Thank you!             Your Updated Medication List - Protect others around you: Learn how to safely use, store and throw away your medicines at www.disposemymeds.org.          This list is accurate as of: 11/17/17 10:36 AM.  Always use your most recent med list.                   Brand Name Dispense Instructions for use Diagnosis    ACETAMINOPHEN PO      Take 500 mg by mouth 2 every 6 hours, morning, noon and night. As Needed for shoulder.        albuterol 108 (90 BASE) MCG/ACT Inhaler    PROAIR HFA/PROVENTIL HFA/VENTOLIN HFA    1 Inhaler    Inhale 2 puffs into the lungs every 6 hours as needed for shortness of breath / dyspnea or wheezing    Intermittent asthma, uncomplicated       aspirin 325 MG EC tablet      one daily    Hyperlipidemia LDL goal < 70, Acute myocardial infarction of other inferior wall, episode of care unspecified       atenolol 50 MG tablet    TENORMIN    90 tablet    Take 1 tablet (50 mg) by mouth daily    Essential hypertension with goal blood pressure less than 140/90       atorvastatin 80 MG tablet    LIPITOR    90 tablet    Take 1 tablet (80 mg) by mouth daily    Hyperlipidemia with target LDL less than 70       carbidopa-levodopa  MG per tablet    SINEMET    180 tablet    Take 2 tablets by mouth daily At bedtime.  Written as 90 day supply.    Restless legs syndrome (RLS)       * cefadroxil 500 MG capsule    DURICEF    180 capsule    TAKE ONE CAPSULE BY MOUTH TWICE A DAY    Prosthetic joint infection, sequela       * cefadroxil 500 MG capsule    DURICEF    180 capsule    Take 1 capsule (500 mg) by mouth 2 times daily    Prosthetic joint infection, sequela       fluorouracil 5 % cream    EFUDEX    40 g    Apply topically 2 times daily    Actinic keratosis       GLUCOSAMINE CHONDR 1500 COMPLX PO       glucosamine(1500mg)plus chondroitin(1200mg) takes 1 tablet daily        hydrochlorothiazide 25 MG tablet    HYDRODIURIL    90 tablet    Take 1 tablet (25 mg) by mouth every morning    Essential hypertension with goal blood pressure less than 140/90       lactobacillus rhamnosus (GG) capsule     90 capsule    Take 1 capsule by mouth daily    Encounter for long-term (current) use of antibiotics       lisinopril 40 MG tablet    PRINIVIL/ZESTRIL    90 tablet    Take 1 tablet (40 mg) by mouth daily    Essential hypertension with goal blood pressure less than 140/90       MUCINEX 600 MG 12 hr tablet   Generic drug:  guaiFENesin      Take 1,200 mg by mouth 2 times daily        nitroGLYcerin 0.4 MG sublingual tablet    NITROSTAT    30 tablet    Place 1 tablet (0.4 mg) under the tongue every 5 minutes as needed for chest pain    Coronary artery disease involving native heart with angina pectoris, unspecified vessel or lesion type (H)       OMEGA-3 FISH OIL PO      Take by mouth 2 times daily (with meals) 1200mg/360mg        order for DME      Equipment being ordered: CPAP Cas Noriega received a [employer] AirSense 10 CPAP. Pressures were set at Auto 11 - 15 cm H2O.        order for DME     1 Device    one digital automatic Home blood pressure machine ( per insurance)    Unspecified essential hypertension       traZODone 100 MG tablet    DESYREL    90 tablet    Take 1 tablet (100 mg) by mouth At Bedtime    Insomnia, unspecified type       * Notice:  This list has 2 medication(s) that are the same as other medications prescribed for you. Read the directions carefully, and ask your doctor or other care provider to review them with you.

## 2017-11-20 ENCOUNTER — OFFICE VISIT (OUTPATIENT)
Dept: FAMILY MEDICINE | Facility: CLINIC | Age: 72
End: 2017-11-20
Payer: COMMERCIAL

## 2017-11-20 VITALS
WEIGHT: 229 LBS | BODY MASS INDEX: 34.71 KG/M2 | SYSTOLIC BLOOD PRESSURE: 128 MMHG | HEIGHT: 68 IN | TEMPERATURE: 97.7 F | DIASTOLIC BLOOD PRESSURE: 72 MMHG | HEART RATE: 60 BPM

## 2017-11-20 DIAGNOSIS — E78.5 HYPERLIPIDEMIA WITH TARGET LDL LESS THAN 70: ICD-10-CM

## 2017-11-20 DIAGNOSIS — G47.33 OSA (OBSTRUCTIVE SLEEP APNEA): ICD-10-CM

## 2017-11-20 DIAGNOSIS — E78.5 DYSLIPIDEMIA: ICD-10-CM

## 2017-11-20 DIAGNOSIS — I25.10 CORONARY ARTERY DISEASE INVOLVING NATIVE CORONARY ARTERY OF NATIVE HEART WITHOUT ANGINA PECTORIS: ICD-10-CM

## 2017-11-20 DIAGNOSIS — I10 ESSENTIAL HYPERTENSION WITH GOAL BLOOD PRESSURE LESS THAN 140/90: ICD-10-CM

## 2017-11-20 DIAGNOSIS — G25.81 RESTLESS LEG SYNDROME: Primary | ICD-10-CM

## 2017-11-20 DIAGNOSIS — I25.119 CORONARY ARTERY DISEASE INVOLVING NATIVE HEART WITH ANGINA PECTORIS, UNSPECIFIED VESSEL OR LESION TYPE (H): ICD-10-CM

## 2017-11-20 DIAGNOSIS — Z79.899 POLYPHARMACY: ICD-10-CM

## 2017-11-20 DIAGNOSIS — I10 HYPERTENSION GOAL BP (BLOOD PRESSURE) < 140/90: ICD-10-CM

## 2017-11-20 DIAGNOSIS — T84.50XS PROSTHETIC JOINT INFECTION, SEQUELA: ICD-10-CM

## 2017-11-20 LAB
ALT SERPL W P-5'-P-CCNC: 24 U/L (ref 0–70)
ANION GAP SERPL CALCULATED.3IONS-SCNC: 12 MMOL/L (ref 3–14)
BUN SERPL-MCNC: 14 MG/DL (ref 7–30)
CALCIUM SERPL-MCNC: 9 MG/DL (ref 8.5–10.1)
CHLORIDE SERPL-SCNC: 105 MMOL/L (ref 94–109)
CHOLEST SERPL-MCNC: 136 MG/DL
CO2 SERPL-SCNC: 23 MMOL/L (ref 20–32)
CREAT SERPL-MCNC: 0.81 MG/DL (ref 0.66–1.25)
GFR SERPL CREATININE-BSD FRML MDRD: >90 ML/MIN/1.7M2
GLUCOSE SERPL-MCNC: 94 MG/DL (ref 70–99)
HDLC SERPL-MCNC: 47 MG/DL
LDLC SERPL CALC-MCNC: 69 MG/DL
NONHDLC SERPL-MCNC: 89 MG/DL
POTASSIUM SERPL-SCNC: 4 MMOL/L (ref 3.4–5.3)
SODIUM SERPL-SCNC: 140 MMOL/L (ref 133–144)
TRIGL SERPL-MCNC: 99 MG/DL

## 2017-11-20 PROCEDURE — 84460 ALANINE AMINO (ALT) (SGPT): CPT | Performed by: FAMILY MEDICINE

## 2017-11-20 PROCEDURE — 36415 COLL VENOUS BLD VENIPUNCTURE: CPT | Performed by: FAMILY MEDICINE

## 2017-11-20 PROCEDURE — 80061 LIPID PANEL: CPT | Performed by: FAMILY MEDICINE

## 2017-11-20 PROCEDURE — 99214 OFFICE O/P EST MOD 30 MIN: CPT | Performed by: PHYSICIAN ASSISTANT

## 2017-11-20 PROCEDURE — 80048 BASIC METABOLIC PNL TOTAL CA: CPT | Performed by: FAMILY MEDICINE

## 2017-11-20 RX ORDER — FLUTICASONE PROPIONATE 50 MCG
1 SPRAY, SUSPENSION (ML) NASAL DAILY
COMMUNITY
End: 2018-07-20

## 2017-11-20 RX ORDER — ROPINIROLE 0.5 MG/1
.5-1 TABLET, FILM COATED ORAL AT BEDTIME
Qty: 60 TABLET | Refills: 0 | Status: SHIPPED | OUTPATIENT
Start: 2017-11-20 | End: 2019-05-16

## 2017-11-20 RX ORDER — CEFADROXIL 500 MG/1
CAPSULE ORAL
Qty: 180 CAPSULE | Refills: 3 | Status: SHIPPED | OUTPATIENT
Start: 2017-11-20 | End: 2018-07-20

## 2017-11-20 NOTE — PROGRESS NOTES
"  SUBJECTIVE:   Cas Noriega is a 72 year old male who presents to clinic today for the following health issues:      Hyperlipidemia Follow-Up      Rate your low fat/cholesterol diet?: poor    Taking statin?  Yes, no muscle aches from statin    Other lipid medications/supplements?:  none    Hypertension Follow-up      Outpatient blood pressures are not being checked. But did have it checked recently in clinic    Low Salt Diet: no added salt    Amount of exercise or physical activity: 2-3 days/week for an average of 15-30 minutes    Problems taking medications regularly: No    Medication side effects: none  Diet: regular (no restrictions)    History of stent and CABG 2007.  Last saw cardiology April 2017, planned recheck 6 mo and echo spring 2018.  Saw sleep specialist April 2017, severe MARTY felt stable, with plan of recheck in 2 yrs.  No chest pains, chest pressures, SOB or sudden change of endurance.   Notes some panting if doing >1 flight of stairs, but this is not a change.  Also notes that this improves [place]   Rarely check's BPs but has home cuff.  No cough with ACE.    Sleep - severe MARTY, insomnia, RLS.  RLS horrible.  On trazodone 100 and also taking 50 mg \"OTC sleep aid\".  Currently taking sinemet , 2 tabs.    On chronic antibiotics for infected total joint.  Wants to continue this so long as he is on antibiotics for infected total joint (shoulder).  Wants to continue probiotic (initially started by Dr Cisse) so long as he is on antibiotics for infected total joint (shoulder).  No longer sees his [facility] specialist for this.    On asa 325.  He states someone told him to do this, unsure who.  Fish oil - told to take by Dr Cisse in past.  No evidence of triglyceridemia >500.    Has just started using Vicks on a thickened toenail.  Is not bothering him.    Flonase helpful - increased by sleep doctor.  Quit mucinex - not helpful.    Wanting full med review " "today.      Problem list and histories reviewed & adjusted, as indicated.  Additional history: as documented    BP Readings from Last 3 Encounters:   11/20/17 128/72   08/22/17 126/64   04/26/17 135/73    Wt Readings from Last 3 Encounters:   11/20/17 229 lb (103.9 kg)   08/22/17 233 lb 12.8 oz (106.1 kg)   04/26/17 236 lb (107 kg)        Labs reviewed in EPIC    Reviewed and updated as needed this visit by clinical staffTobacco  Allergies  Meds  Problems  Med Hx  Surg Hx  Fam Hx  Soc Hx        Reviewed and updated as needed this visit by Provider  Tobacco  Allergies  Meds  Problems  Med Hx  Surg Hx  Fam Hx  Soc Hx          ROS:  Constitutional, HEENT, cardiovascular, pulmonary, musculoskeletal, neuro, systems are negative, except as otherwise noted.    OBJECTIVE:   /72  Pulse 60  Temp 97.7  F (36.5  C) (Tympanic)  Ht 5' 8.11\" (1.73 m)  Wt 229 lb (103.9 kg)  BMI 34.71 kg/m2  Body mass index is 34.71 kg/(m^2).  GENERAL: healthy, alert and no distress  NECK: no adenopathy, no asymmetry, masses, or scars   RESP: lungs clear to auscultation - no rales, rhonchi or wheezes  CV: regular rate and rhythm, normal S1 S2, no S3 or S4, no murmur, click or rub  NEURO: normal speech, no resting tremor  ASSESSMENT/PLAN:       ICD-10-CM    1. Restless leg syndrome G25.81 rOPINIRole (REQUIP) 0.5 MG tablet   2. Hypertension goal BP (blood pressure) < 140/90 I10    3. Polypharmacy Z79.899    4. Coronary artery disease involving native heart with angina pectoris, unspecified vessel or lesion type (H) I25.119    5. SEVERE MARTY (obstructive sleep apnea) G47.33    6. Dyslipidemia E78.5    7. 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    See dosing on prescription.  Let me know how this med works, and if it works, what dose.  Keep trazodone same for now.    Glucosamine-chondrotin -  No evidence it helps joints.  Some patients feel it helps.  Since you don't know if it helps, try stopping it.  Can restart if joint pain worse.    Omega fish oil -  Nowadays, fish oil has little evidence to support OTC strengths.  Sometimes used for triglycerides or if can't tolerate atorvastatin, but no evidence that it helps in addition to atorvastatin.  Try stopping fish oil.  Will see if impacts cholesterol or triglycerides.    Aspirin -  Usually no further benefit from being on aspirin 325 mg instead of aspirin 81 mg.  Higher strength usually has increased risk of stomach bleed, with no further protective benefit.  Suggest asking cardiology when you see them next.    Cardiology wrote down to see you in 6 months (would have been October).      Vaccines -  Generally up to date  In a few months, ask about new shingles vaccine Shingrix.  Not available yet but is excellent vaccine.    Recheck in 3-4 weeks to see how new restless leg medicine is doing.  Call if questions.      Sera Garcia PA-C  [facility]

## 2017-11-20 NOTE — MR AVS SNAPSHOT
After Visit Summary   11/20/2017    Cas Noriega    MRN: [id number]           Patient Information     Date Of Birth          1945        Visit Information        Provider Department      11/20/2017 8:00 AM Sera Garcia PA-C [facility]        Today's Diagnoses     Hypertension goal BP (blood pressure) < 140/90    -  1    Coronary artery disease involving native heart with angina pectoris, unspecified vessel or lesion type (H)        SEVERE MARTY (obstructive sleep apnea)        Dyslipidemia        Restless leg syndrome        Prosthetic joint infection, sequela          Care Instructions    BP today slightly high  If high on recheck here today,check a weekly BP and send readings to us in a few weeks  Then suggest checking occasionally - maybe monthly    Restless legs -  Not doing well  Try stopping levodopa-carbidopa, and try ropiprinole instead.    See dosing on prescription.  Let me know how this med works, and if it works, what dose.  Keep trazodone same for now.    Glucosamine-chondrotin -  No evidence it helps joints.  Some patients feel it helps.  Since you don't know if it helps, try stopping it.  Can restart if joint pain worse.    Omega fish oil -  Nowadays, fish oil has little evidence to support OTC strengths.  Sometimes used for triglycerides or if can't tolerate atorvastatin, but no evidence that it helps in addition to atorvastatin.  Try stopping fish oil.  Will see if impacts cholesterol or triglycerides.    Aspirin -  Usually no further benefit from being on aspirin 325 mg instead of aspirin 81 mg.  Higher strength usually has increased risk of stomach bleed, with no further protective benefit.  Suggest asking cardiology when you see them next.    Cardiology wrote down to see you in 6 months (would have been October).      Vaccines -  Generally up to date  In a few months, ask about new shingles vaccine Shingrix.  Not available yet but is excellent  "vaccine.    Recheck in 3-4 weeks to see how new restless leg medicine is doing.  Call if questions.          Follow-ups after your visit        Who to contact     If you have questions or need follow up information about today's clinic visit or your schedule please contact [facility] directly at [phone].  Normal or non-critical lab and imaging results will be communicated to you by [employer], letter or phone within 4 business days after the clinic has received the results. If you do not hear from us within 7 days, please contact the clinic through [employer] or phone. If you have a critical or abnormal lab result, we will notify you by phone as soon as possible.  Submit refill requests through [employer] or call your pharmacy and they will forward the refill request to us. Please allow 3 business days for your refill to be completed.          Additional Information About Your Visit        MyChart Information     [employer] gives you secure access to your electronic health record. If you see a primary care provider, you can also send messages to your care team and make appointments. If you have questions, please call your primary care clinic.  If you do not have a primary care provider, please call [phone] and they will assist you.        Care EveryWhere ID     This is your Care EveryWhere ID. This could be used by other organizations to access your [facility] medical records  [id number]        Your Vitals Were     Pulse Temperature Height BMI (Body Mass Index)          60 97.7  F (36.5  C) (Tympanic) 5' 8.11\" (1.73 m) 34.71 kg/m2         Blood Pressure from Last 3 Encounters:   11/20/17 144/76   08/22/17 126/64   04/26/17 135/73    Weight from Last 3 Encounters:   11/20/17 229 lb (103.9 kg)   08/22/17 233 lb 12.8 oz (106.1 kg)   04/26/17 236 lb (107 kg)              Today, you had the following     No orders found for display         Today's Medication Changes          These changes are accurate as " of: 11/20/17  8:55 AM.  If you have any questions, ask your nurse or doctor.               Start taking these medicines.        Dose/Directions    rOPINIRole 0.5 MG tablet   Commonly known as:  REQUIP   Used for:  Restless leg syndrome   Started by:  Sera Garcia PA-C        Dose:  0.5-1 mg   Take 1-2 tablets (0.5-1 mg) by mouth At Bedtime   Quantity:  60 tablet   Refills:  0         These medicines have changed or have updated prescriptions.        Dose/Directions    cefadroxil 500 MG capsule   Commonly known as:  DURICEF   This may have changed:  See the new instructions.   Used for:  Prosthetic joint infection, sequela   Changed by:  Sera Garcia PA-C        TAKE ONE CAPSULE BY MOUTH TWICE A DAY.  Lifelong, for infected total shoulder replacement.   Quantity:  180 capsule   Refills:  3            Where to get your medicines      These medications were sent to [facility] [place] [address]     Phone:  [phone]     cefadroxil 500 MG capsule    rOPINIRole 0.5 MG tablet                Primary Care Provider Office Phone # Fax #    Sera Garcia PA-C [phone]       [address] [place] [address]        Equal Access to Services     MARIA A ARANA : Hadii aad ku hadasho Soomaali, waaxda luqadaha, qaybta kaalmada adeegyada, rio vela. So [facility] [phone].    ATENCIÓN: Si habla español, tiene a holbrook disposición servicios gratuitos de asistencia lingüística. Llame al [phone].    We comply with applicable federal civil rights laws and [place] laws. We do not discriminate on the basis of race, color, national origin, age, disability, sex, sexual orientation, or gender identity.            Thank you!     Thank you for choosing [facility]  for your care. Our goal is always to provide you with excellent care. Hearing back from our patients  is one way we can continue to improve our services. Please take a few minutes to complete the written survey that you may receive in the mail after your visit with us. Thank you!             Your Updated Medication List - Protect others around you: Learn how to safely use, store and throw away your medicines at www.disposemymeds.org.          This list is accurate as of: 11/20/17  8:55 AM.  Always use your most recent med list.                   Brand Name Dispense Instructions for use Diagnosis    ACETAMINOPHEN PO      Take 500 mg by mouth 2 every 6 hours, morning, noon and night. As Needed for shoulder.        albuterol 108 (90 BASE) MCG/ACT Inhaler    PROAIR HFA/PROVENTIL HFA/VENTOLIN HFA    1 Inhaler    Inhale 2 puffs into the lungs every 6 hours as needed for shortness of breath / dyspnea or wheezing    Intermittent asthma, uncomplicated       aspirin 325 MG EC tablet      one daily    Hyperlipidemia LDL goal < 70, Acute myocardial infarction of other inferior wall, episode of care unspecified       atenolol 50 MG tablet    TENORMIN    90 tablet    Take 1 tablet (50 mg) by mouth daily    Essential hypertension with goal blood pressure less than 140/90       atorvastatin 80 MG tablet    LIPITOR    90 tablet    Take 1 tablet (80 mg) by mouth daily    Hyperlipidemia with target LDL less than 70       carbidopa-levodopa  MG per tablet    SINEMET    180 tablet    Take 2 tablets by mouth daily At bedtime.  Written as 90 day supply.    Restless legs syndrome (RLS)       cefadroxil 500 MG capsule    DURICEF    180 capsule    TAKE ONE CAPSULE BY MOUTH TWICE A DAY.  Lifelong, for infected total shoulder replacement.    Prosthetic joint infection, sequela       FLONASE 50 MCG/ACT spray   Generic drug:  fluticasone      Spray 1 spray into both nostrils daily        fluorouracil 5 % cream    EFUDEX    40 g    Apply topically 2 times daily    Actinic keratosis       hydrochlorothiazide 25 MG tablet    HYDRODIURIL    90  tablet    Take 1 tablet (25 mg) by mouth every morning    Essential hypertension with goal blood pressure less than 140/90       lactobacillus rhamnosus (GG) capsule     90 capsule    Take 1 capsule by mouth daily    Encounter for long-term (current) use of antibiotics       lisinopril 40 MG tablet    PRINIVIL/ZESTRIL    90 tablet    Take 1 tablet (40 mg) by mouth daily    Essential hypertension with goal blood pressure less than 140/90       nitroGLYcerin 0.4 MG sublingual tablet    NITROSTAT    30 tablet    Place 1 tablet (0.4 mg) under the tongue every 5 minutes as needed for chest pain    Coronary artery disease involving native heart with angina pectoris, unspecified vessel or lesion type (H)       order for DME      Equipment being ordered: CPAP Cas Noriega received a [employer] AirSense 10 CPAP. Pressures were set at Auto 11 - 15 cm H2O.        order for DME     1 Device    one digital automatic Home blood pressure machine ( per insurance)    Unspecified essential hypertension       rOPINIRole 0.5 MG tablet    REQUIP    60 tablet    Take 1-2 tablets (0.5-1 mg) by mouth At Bedtime    Restless leg syndrome       traZODone 100 MG tablet    DESYREL    90 tablet    Take 1 tablet (100 mg) by mouth At Bedtime    Insomnia, unspecified type

## 2017-11-20 NOTE — NURSING NOTE
"Chief Complaint   Patient presents with     Hypertension     Lipids     Recheck Medication       Initial /76 (BP Location: Right arm, Patient Position: Chair, Cuff Size: Adult Large)  Pulse 60  Temp 97.7  F (36.5  C) (Tympanic)  Ht 5' 8.11\" (1.73 m)  Wt 229 lb (103.9 kg)  BMI 34.71 kg/m2 Estimated body mass index is 34.71 kg/(m^2) as calculated from the following:    Height as of this encounter: 5' 8.11\" (1.73 m).    Weight as of this encounter: 229 lb (103.9 kg).  Medication Reconciliation: complete    Health Maintenance that is potentially due pending provider review:  NONE        Is there anyone who you would like to be able to receive your results? No  If yes have patient fill out JACE      "

## 2017-11-20 NOTE — PATIENT INSTRUCTIONS
BP today slightly high  If high on recheck here today,check a weekly BP and send readings to us in a few weeks  Then suggest checking occasionally - maybe monthly    Restless legs -  Not doing well  Try stopping levodopa-carbidopa, and try ropiprinole instead.    See dosing on prescription.  Let me know how this med works, and if it works, what dose.  Keep trazodone same for now.    Glucosamine-chondrotin -  No evidence it helps joints.  Some patients feel it helps.  Since you don't know if it helps, try stopping it.  Can restart if joint pain worse.    Omega fish oil -  Nowadays, fish oil has little evidence to support OTC strengths.  Sometimes used for triglycerides or if can't tolerate atorvastatin, but no evidence that it helps in addition to atorvastatin.  Try stopping fish oil.  Will see if impacts cholesterol or triglycerides.    Aspirin -  Usually no further benefit from being on aspirin 325 mg instead of aspirin 81 mg.  Higher strength usually has increased risk of stomach bleed, with no further protective benefit.  Suggest asking cardiology when you see them next.    Cardiology wrote down to see you in 6 months (would have been October).      Vaccines -  Generally up to date  In a few months, ask about new shingles vaccine Shingrix.  Not available yet but is excellent vaccine.    Recheck in 3-4 weeks to see how new restless leg medicine is doing.  Call if questions.

## 2017-12-04 ENCOUNTER — OFFICE VISIT (OUTPATIENT)
Dept: CARDIOLOGY | Facility: CLINIC | Age: 72
End: 2017-12-04
Attending: INTERNAL MEDICINE
Payer: COMMERCIAL

## 2017-12-04 VITALS
HEART RATE: 56 BPM | SYSTOLIC BLOOD PRESSURE: 125 MMHG | WEIGHT: 237 LBS | OXYGEN SATURATION: 96 % | BODY MASS INDEX: 35.92 KG/M2 | DIASTOLIC BLOOD PRESSURE: 68 MMHG

## 2017-12-04 DIAGNOSIS — E78.5 HYPERLIPIDEMIA WITH TARGET LDL LESS THAN 70: ICD-10-CM

## 2017-12-04 DIAGNOSIS — I25.10 CORONARY ARTERY DISEASE INVOLVING NATIVE CORONARY ARTERY OF NATIVE HEART WITHOUT ANGINA PECTORIS: ICD-10-CM

## 2017-12-04 DIAGNOSIS — I10 ESSENTIAL HYPERTENSION WITH GOAL BLOOD PRESSURE LESS THAN 140/90: ICD-10-CM

## 2017-12-04 PROCEDURE — 99214 OFFICE O/P EST MOD 30 MIN: CPT | Performed by: INTERNAL MEDICINE

## 2017-12-04 RX ORDER — ASPIRIN 81 MG/1
162 TABLET ORAL DAILY
Qty: 60 TABLET | Refills: 11 | Status: SHIPPED | OUTPATIENT
Start: 2017-12-04 | End: 2018-07-11

## 2017-12-04 NOTE — PATIENT INSTRUCTIONS
Thank you for your [facility] visit today. Your provider has recommended the following:  Medication Changes:  None today. Continue taking your medications as you have been.  Recommendations:  1. Fasting blood work and echocardiogram to be done in 4 months just prior to your follow up with Dr Hua  Follow-up:  See Dr Hua for cardiology follow up in 4 months.  We kindly ask that you call cardiology scheduling at [phone] three months prior to requested revisit date to schedule future cardiology appointments.  Reminder:  1. Please bring in your current medication list or your medication, over the counter supplements and vitamin bottles as we will review these at each office visit.               [facility]  [facility]~[address]~[place], [place]~[address]  Questions about your visit today?  Call your Cardiology Clinic RN's-Meryl Shay and/or Zee Cosby at [phone].

## 2017-12-04 NOTE — MR AVS SNAPSHOT
After Visit Summary   12/4/2017    Cas Noriega    MRN: [id number]           Patient Information     Date Of Birth          1945        Visit Information        Provider Department      12/4/2017 9:30 AM Donny Hua MD [facility]        Today's Diagnoses     Coronary artery disease involving native coronary artery of native heart without angina pectoris        Essential hypertension with goal blood pressure less than 140/90        Hyperlipidemia with target LDL less than 70          Care Instructions    Thank you for your  Heart Care visit today. Your provider has recommended the following:  Medication Changes:  None today. Continue taking your medications as you have been.  Recommendations:  1. Fasting blood work and echocardiogram to be done in 4 months just prior to your follow up with Dr Hua  Follow-up:  See Dr Hua for cardiology follow up in 4 months.  We kindly ask that you call cardiology scheduling at [phone] three months prior to requested revisit date to schedule future cardiology appointments.  Reminder:  1. Please bring in your current medication list or your medication, over the counter supplements and vitamin bottles as we will review these at each office visit.               [facility]~[address]~[place], [place]~[address]  Questions about your visit today?  Call your Cardiology Clinic RN's-Meryl Shay and/or Zee Cosby at [phone].                Follow-ups after your visit        Additional Services     Follow-Up with Cardiologist                 Future tests that were ordered for you today     Open Future Orders        Priority Expected Expires Ordered    Basic metabolic panel Routine 4/3/2018 12/4/2018 12/4/2017    Lipid Profile Routine 4/3/2018 12/4/2018 12/4/2017    ALT Routine 4/3/2018 12/4/2018 12/4/2017    Echocardiogram Routine  4/3/2018 12/4/2018 12/4/2017    Follow-Up with Cardiologist Routine 4/3/2018 12/4/2018 12/4/2017            Who to contact     If you have questions or need follow up information about today's clinic visit or your schedule please contact [facility] directly at [phone].  Normal or non-critical lab and imaging results will be communicated to you by MyChart, letter or phone within 4 business days after the clinic has received the results. If you do not hear from us within 7 days, please contact the clinic through [employer] or phone. If you have a critical or abnormal lab result, we will notify you by phone as soon as possible.  Submit refill requests through [employer] or call your pharmacy and they will forward the refill request to us. Please allow 3 business days for your refill to be completed.          Additional Information About Your Visit        [employer] Information     [employer] gives you secure access to your electronic health record. If you see a primary care provider, you can also send messages to your care team and make appointments. If you have questions, please call your primary care clinic.  If you do not have a primary care provider, please call [phone] and they will assist you.        Care EveryWhere ID     This is your Care EveryWhere ID. This could be used by other organizations to access your [facility] medical records  [id number]        Your Vitals Were     Pulse Pulse Oximetry BMI (Body Mass Index)             56 96% 35.92 kg/m2          Blood Pressure from Last 3 Encounters:   12/04/17 125/68   11/20/17 128/72   08/22/17 126/64    Weight from Last 3 Encounters:   12/04/17 107.5 kg (237 lb)   11/20/17 103.9 kg (229 lb)   08/22/17 106.1 kg (233 lb 12.8 oz)              We Performed the Following     Follow-Up with Cardiologist          Today's Medication Changes          These changes are accurate as of: 12/4/17 10:12 AM.  If you have any questions, ask  your nurse or doctor.               These medicines have changed or have updated prescriptions.        Dose/Directions    aspirin EC 81 MG EC tablet   This may have changed:  See the new instructions.   Used for:  Coronary artery disease involving native coronary artery of native heart without angina pectoris   Changed by:  Donny Hua MD        Dose:  162 mg   Take 2 tablets (162 mg) by mouth daily   Quantity:  60 tablet   Refills:  11            Where to get your medicines      These medications were sent to [id number]     Phone:  [phone]     aspirin EC 81 MG EC tablet                Primary Care Provider Office Phone # Fax #    Sera Garcia PA-C [phone]       [address] [place] [id number]        Equal Access to Services     MARIA A ARANA : Hadii glory klein hadasho Somaira, waaxda luqadaha, qaybta kaalmada adeegyada, rio steel . So [place] [phone].    ATENCIÓN: Si habla español, tiene a holbrook disposición servicios gratuitos de asistencia lingüística. [facility] [phone].    We comply with applicable federal civil rights laws and [place] laws. We do not discriminate on the basis of race, color, national origin, age, disability, sex, sexual orientation, or gender identity.            Thank you!     Thank you for choosing [facility]  for your care. Our goal is always to provide you with excellent care. Hearing back from our patients is one way we can continue to improve our services. Please take a few minutes to complete the written survey that you may receive in the mail after your visit with us. Thank you!             Your Updated Medication List - Protect others around you: Learn how to safely use, store and throw away your medicines at www.disposemymeds.org.          This list is accurate as  of: 12/4/17 10:12 AM.  Always use your most recent med list.                   Brand Name Dispense Instructions for use Diagnosis    ACETAMINOPHEN PO      Take 500 mg by mouth 2 every 6 hours, morning, noon and night. As Needed for shoulder.        albuterol 108 (90 BASE) MCG/ACT Inhaler    PROAIR HFA/PROVENTIL HFA/VENTOLIN HFA    1 Inhaler    Inhale 2 puffs into the lungs every 6 hours as needed for shortness of breath / dyspnea or wheezing    Intermittent asthma, uncomplicated       aspirin EC 81 MG EC tablet     60 tablet    Take 2 tablets (162 mg) by mouth daily    Coronary artery disease involving native coronary artery of native heart without angina pectoris       atenolol 50 MG tablet    TENORMIN    90 tablet    Take 1 tablet (50 mg) by mouth daily    Essential hypertension with goal blood pressure less than 140/90       atorvastatin 80 MG tablet    LIPITOR    90 tablet    Take 1 tablet (80 mg) by mouth daily    Hyperlipidemia with target LDL less than 70       carbidopa-levodopa  MG per tablet    SINEMET    180 tablet    Take 2 tablets by mouth daily At bedtime.  Written as 90 day supply.    Restless legs syndrome (RLS)       cefadroxil 500 MG capsule    DURICEF    180 capsule    TAKE ONE CAPSULE BY MOUTH TWICE A DAY.  Lifelong, for infected total shoulder replacement.    Prosthetic joint infection, sequela       FLONASE 50 MCG/ACT spray   Generic drug:  fluticasone      Spray 1 spray into both nostrils daily        fluorouracil 5 % cream    EFUDEX    40 g    Apply topically 2 times daily    Actinic keratosis       hydrochlorothiazide 25 MG tablet    HYDRODIURIL    90 tablet    Take 1 tablet (25 mg) by mouth every morning    Essential hypertension with goal blood pressure less than 140/90       lactobacillus rhamnosus (GG) capsule     90 capsule    Take 1 capsule by mouth daily    Encounter for long-term (current) use of antibiotics       lisinopril 40 MG tablet    PRINIVIL/ZESTRIL    90 tablet    Take  1 tablet (40 mg) by mouth daily    Essential hypertension with goal blood pressure less than 140/90       nitroGLYcerin 0.4 MG sublingual tablet    NITROSTAT    30 tablet    Place 1 tablet (0.4 mg) under the tongue every 5 minutes as needed for chest pain    Coronary artery disease involving native heart with angina pectoris, unspecified vessel or lesion type (H)       order for DME      Equipment being ordered: CPAP Cas Noriega received a [employer] AirSense 10 CPAP. Pressures were set at Auto 11 - 15 cm H2O.        order for DME     1 Device    one digital automatic Home blood pressure machine ( per insurance)    Unspecified essential hypertension       rOPINIRole 0.5 MG tablet    REQUIP    60 tablet    Take 1-2 tablets (0.5-1 mg) by mouth At Bedtime    Restless leg syndrome       traZODone 100 MG tablet    DESYREL    90 tablet    Take 1 tablet (100 mg) by mouth At Bedtime    Insomnia, unspecified type

## 2017-12-04 NOTE — LETTER
12/4/2017    Sera Garcia PA-C  [address] [place] [address]    RE: Cas Noriega       Dear Colleague,    I had the pleasure of seeing Cas Noriega in the [facility].    The patient is a 72-year-old overweight white male with a history of ischemic heart disease dating back to 2007 when he had an inferior wall myocardial infarction treated emergently at [facility] with 2 stents placed.  He was subsequently scheduled for bypass surgery of multivessel disease.  He underwent that procedure without difficulty, although there are minimal records available.        He has had previous history of hyperlipidemia, hypertension, sleep apnea and previous history of cigarette smoking with restless legs syndrome.  He has been able to participate in most activities without significant restriction.  He has unfortunately had a significant decrease in hearing and now is using hearing aids using bone transition for sound on both left and right sides.  He denies peg chest discomfort, shortness of breath, dizziness, palpitations, nausea, vomiting, diaphoresis or syncope.  He denies PND, orthopnea, fever, chills or sweats.        He did have a slight accident cutting oak logs and scraped his arm and had somewhat profuse bleeding but was readily controlled with compression.        Lexiscan Cardiolite stress test has demonstrated mid to basilar inferior/inferolateral scar without evidence of ischemia.  Gating demonstrated a normal-sized left ventricle with mild inferior wall hypokinesis and ejection fraction was low normal at 51%, no significant change from 2015.  Echocardiography last year showed normal-sized left ventricle with ejection fraction 60%-65%, normal right ventricular systolic function, valvular insufficiency or stenosis.        He is able to participate in most activities without significant restriction.  If he moves too quickly to get up, he may  feel slight lightheadedness.  He again has had no symptoms of chest discomfort, shortness of breath, dizziness or palpitations.      MEDICATIONS:   1.  Aspirin 325 mg a day.   2.  Flonase as directed.   3.  Requip 0.5 to 1.0 mg at bedtime.   4.  Duricef 500 mg as needed.   5.  Hydrochlorothiazide 25 mg a day.   6.  Trazodone 100 mg at bedtime.   7.  Atenolol 50 mg a day.   8.  Lisinopril 40 mg a day.   9.  Carbidopa/levodopa 25/100 mg 2 tablets at bedtime.   10.  Atorvastatin 80 mg a day.   11.  Probiotic 1 capsule a day.   12.  Albuterol as directed.   13.  Nitroglycerin 0.4 mg sublingual p.r.n.   14.  Acetaminophen 1000 mg every 6 hours as needed.      LABORATORY DATA:  Shows a cholesterol 136, HDL 47, LDL 69, triglyceride 99.  Sodium 140, potassium 4.0, BUN 14, creatinine 0.81.  ALT 24.      The patient presents to Cardiology Clinic for followup of his ischemic heart disease.  He is doing well, participating in activities without significant restriction.      PHYSICAL EXAMINATION:   VITAL SIGNS:  Blood pressure 125/68 with a heart rate 56 and regular.  Weight was 237 pounds, which is stable.   NECK:  Without jugular venous distention, carotid bruit or palpable thyroid.   CHEST:  Essentially clear to percussion and auscultation with slight decreased breath sounds at the bases, slightly prolonged expiratory phase and isolated basilar crackles.   CARDIAC:  Regular rhythm, soft S4 gallop, 1-2/6 systolic murmur at the left sternal border radiating towards the apex.  No diastolic murmur, rub or S3.   EXTREMITIES:  Without cyanosis or edema.      CLINICAL IMPRESSION:   1.  Stable cardiac condition.   2.  Ischemic heart disease, status post inferior wall myocardial infarction, PTCA and stent in the right coronary artery in 2007 with subsequent bypass surgery for multivessel disease in 2008.   3.  Distant history of cigarette smoking.   4.  History of hypertension, well controlled.   5.  Hyperlipidemia, at goal and  improved.   6.  Probable sleep apnea but not on CPAP.   7.  History of peripheral vascular disease.   8.  History of anxiety and depression.   9.  History of cataracts.   10.  History of osteoarthritis.   11.  History of hearing loss, treated with bone conduction.      DISCUSSION:  The patient has done well clinically with regards to his cardiac status.  He has had no significant symptoms of angina pectoris or congestive heart failure.  He will need continued close followup of his serum lipids, basic metabolic panel and blood pressure.  His aspirin will be reduced from 325 mg to 162 mg a day because of recent bleeding incidents.  He has had no evidence of ischemia by Cardiolite stress testing.  He will have an echocardiogram early 2018 to follow left ventricular function.  Otherwise, he appears stable and satisfied with his lifestyle.      RECOMMENDATIONS:   1.  Continue present medications except adjust aspirin to 162 mg a day.   2.  Close followup of serum lipids, basic metabolic panel and blood pressure.   3.  Diet and exercise program as tolerated.   4.  Orthopedic and hearing followup.   5.  Consider sleep evaluation.   6.  Echocardiography in 4 months to follow left ventricular function.   7.  Routine medical followup.   8.  Cardiology followup up in 4 months.      Again, thank you for allowing me to participate in the care of your patient.      Sincerely,    Donny Hua MD     [facility]

## 2017-12-04 NOTE — PROGRESS NOTES
HISTORY OF PRESENT ILLNESS:  The patient is a 72-year-old overweight white male with a history of ischemic heart disease dating back to 2007 when he had an inferior wall myocardial infarction treated emergently at [facility] with 2 stents placed.  He was subsequently scheduled for bypass surgery of multivessel disease.  He underwent that procedure without difficulty, although there are minimal records available.        He has had previous history of hyperlipidemia, hypertension, sleep apnea and previous history of cigarette smoking with restless legs syndrome.  He has been able to participate in most activities without significant restriction.  He has unfortunately had a significant decrease in hearing and now is using hearing aids using bone transition for sound on both left and right sides.  He denies peg chest discomfort, shortness of breath, dizziness, palpitations, nausea, vomiting, diaphoresis or syncope.  He denies PND, orthopnea, fever, chills or sweats.        He did have a slight accident cutting oak logs and scraped his arm and had somewhat profuse bleeding but was readily controlled with compression.        Lexiscan Cardiolite stress test has demonstrated mid to basilar inferior/inferolateral scar without evidence of ischemia.  Gating demonstrated a normal-sized left ventricle with mild inferior wall hypokinesis and ejection fraction was low normal at 51%, no significant change from 2015.  Echocardiography last year showed normal-sized left ventricle with ejection fraction 60%-65%, normal right ventricular systolic function, valvular insufficiency or stenosis.        He is able to participate in most activities without significant restriction.  If he moves too quickly to get up, he may feel slight lightheadedness.  He again has had no symptoms of chest discomfort, shortness of breath, dizziness or palpitations.      MEDICATIONS:   1.  Aspirin 325 mg a day.   2.  Flonase as directed.    3.  Requip 0.5 to 1.0 mg at bedtime.   4.  Duricef 500 mg as needed.   5.  Hydrochlorothiazide 25 mg a day.   6.  Trazodone 100 mg at bedtime.   7.  Atenolol 50 mg a day.   8.  Lisinopril 40 mg a day.   9.  Carbidopa/levodopa 25/100 mg 2 tablets at bedtime.   10.  Atorvastatin 80 mg a day.   11.  Probiotic 1 capsule a day.   12.  Albuterol as directed.   13.  Nitroglycerin 0.4 mg sublingual p.r.n.   14.  Acetaminophen 1000 mg every 6 hours as needed.      LABORATORY DATA:  Shows a cholesterol 136, HDL 47, LDL 69, triglyceride 99.  Sodium 140, potassium 4.0, BUN 14, creatinine 0.81.  ALT 24.      The patient presents to Cardiology Clinic for followup of his ischemic heart disease.  He is doing well, participating in activities without significant restriction.      PHYSICAL EXAMINATION:   VITAL SIGNS:  Blood pressure 125/68 with a heart rate 56 and regular.  Weight was 237 pounds, which is stable.   NECK:  Without jugular venous distention, carotid bruit or palpable thyroid.   CHEST:  Essentially clear to percussion and auscultation with slight decreased breath sounds at the bases, slightly prolonged expiratory phase and isolated basilar crackles.   CARDIAC:  Regular rhythm, soft S4 gallop, 1-2/6 systolic murmur at the left sternal border radiating towards the apex.  No diastolic murmur, rub or S3.   EXTREMITIES:  Without cyanosis or edema.      CLINICAL IMPRESSION:   1.  Stable cardiac condition.   2.  Ischemic heart disease, status post inferior wall myocardial infarction, PTCA and stent in the right coronary artery in 2007 with subsequent bypass surgery for multivessel disease in 2008.   3.  Distant history of cigarette smoking.   4.  History of hypertension, well controlled.   5.  Hyperlipidemia, at goal and improved.   6.  Probable sleep apnea but not on CPAP.   7.  History of peripheral vascular disease.   8.  History of anxiety and depression.   9.  History of cataracts.   10.  History of osteoarthritis.    11.  History of hearing loss, treated with bone conduction.      DISCUSSION:  The patient has done well clinically with regards to his cardiac status.  He has had no significant symptoms of angina pectoris or congestive heart failure.  He will need continued close followup of his serum lipids, basic metabolic panel and blood pressure.  His aspirin will be reduced from 325 mg to 162 mg a day because of recent bleeding incidents.  He has had no evidence of ischemia by Cardiolite stress testing.  He will have an echocardiogram early  to follow left ventricular function.  Otherwise, he appears stable and satisfied with his lifestyle.      RECOMMENDATIONS:   1.  Continue present medications except adjust aspirin to 162 mg a day.   2.  Close followup of serum lipids, basic metabolic panel and blood pressure.   3.  Diet and exercise program as tolerated.   4.  Orthopedic and hearing followup.   5.  Consider sleep evaluation.   6.  Echocardiography in 4 months to follow left ventricular function.   7.  Routine medical followup.   8.  Cardiology followup up in 4 months.      cc:      [place] [address]         DUSTIN ZHENG MD, FACC             D: 2017 10:19   T: 2017 11:46   MT: MARK      Name:     CHARLES SANCHEZ   MRN:      [id number]        Account:      [id number]   :      1945           Service Date: 2017      Document: [id number]

## 2017-12-30 DIAGNOSIS — R09.81 CHRONIC NASAL CONGESTION: ICD-10-CM

## 2017-12-30 DIAGNOSIS — T84.50XS PROSTHETIC JOINT INFECTION, SEQUELA: ICD-10-CM

## 2018-01-02 DIAGNOSIS — R09.81 CHRONIC NASAL CONGESTION: ICD-10-CM

## 2018-01-02 RX ORDER — FLUTICASONE PROPIONATE 50 MCG
SPRAY, SUSPENSION (ML) NASAL
Qty: 48 G | Refills: 3 | Status: SHIPPED | OUTPATIENT
Start: 2018-01-02 | End: 2018-07-20

## 2018-01-02 RX ORDER — FLUTICASONE PROPIONATE 50 MCG
SPRAY, SUSPENSION (ML) NASAL
Qty: 48 G | Refills: 3 | Status: SHIPPED | OUTPATIENT
Start: 2018-01-02 | End: 2018-07-24

## 2018-01-03 RX ORDER — CEFADROXIL 500 MG/1
CAPSULE ORAL
Qty: 180 CAPSULE | Refills: 2 | Status: SHIPPED | OUTPATIENT
Start: 2018-01-03 | End: 2018-07-11

## 2018-04-26 DIAGNOSIS — G47.00 INSOMNIA, UNSPECIFIED TYPE: ICD-10-CM

## 2018-04-26 DIAGNOSIS — I10 ESSENTIAL HYPERTENSION WITH GOAL BLOOD PRESSURE LESS THAN 140/90: ICD-10-CM

## 2018-04-26 DIAGNOSIS — G25.81 RESTLESS LEGS SYNDROME (RLS): ICD-10-CM

## 2018-04-26 DIAGNOSIS — E78.5 HYPERLIPIDEMIA WITH TARGET LDL LESS THAN 70: ICD-10-CM

## 2018-04-27 RX ORDER — CARBIDOPA AND LEVODOPA 25; 100 MG/1; MG/1
TABLET ORAL
Qty: 180 TABLET | Refills: 1 | Status: SHIPPED | OUTPATIENT
Start: 2018-04-27 | End: 2018-07-11

## 2018-04-27 RX ORDER — LISINOPRIL 40 MG/1
TABLET ORAL
Qty: 90 TABLET | Refills: 1 | Status: SHIPPED | OUTPATIENT
Start: 2018-04-27 | End: 2018-07-11

## 2018-04-27 RX ORDER — ATENOLOL 50 MG/1
TABLET ORAL
Qty: 90 TABLET | Refills: 1 | Status: SHIPPED | OUTPATIENT
Start: 2018-04-27 | End: 2018-07-11

## 2018-04-27 RX ORDER — TRAZODONE HYDROCHLORIDE 100 MG/1
TABLET ORAL
Qty: 90 TABLET | Refills: 1 | Status: SHIPPED | OUTPATIENT
Start: 2018-04-27 | End: 2018-07-11

## 2018-04-27 RX ORDER — ATORVASTATIN CALCIUM 80 MG/1
TABLET, FILM COATED ORAL
Qty: 90 TABLET | Refills: 1 | Status: SHIPPED | OUTPATIENT
Start: 2018-04-27 | End: 2018-07-11

## 2018-04-27 RX ORDER — HYDROCHLOROTHIAZIDE 25 MG/1
TABLET ORAL
Qty: 90 TABLET | Refills: 1 | Status: SHIPPED | OUTPATIENT
Start: 2018-04-27 | End: 2018-07-11

## 2018-05-02 DIAGNOSIS — G47.33 OBSTRUCTIVE SLEEP APNEA SYNDROME: Primary | ICD-10-CM

## 2018-07-11 DIAGNOSIS — E78.5 HYPERLIPIDEMIA WITH TARGET LDL LESS THAN 70: ICD-10-CM

## 2018-07-11 DIAGNOSIS — I25.10 CORONARY ARTERY DISEASE INVOLVING NATIVE CORONARY ARTERY OF NATIVE HEART WITHOUT ANGINA PECTORIS: ICD-10-CM

## 2018-07-11 DIAGNOSIS — G47.00 INSOMNIA, UNSPECIFIED TYPE: ICD-10-CM

## 2018-07-11 DIAGNOSIS — G25.81 RESTLESS LEGS SYNDROME (RLS): ICD-10-CM

## 2018-07-11 DIAGNOSIS — I10 ESSENTIAL HYPERTENSION WITH GOAL BLOOD PRESSURE LESS THAN 140/90: ICD-10-CM

## 2018-07-11 DIAGNOSIS — T84.50XS PROSTHETIC JOINT INFECTION, SEQUELA: ICD-10-CM

## 2018-07-11 RX ORDER — CARBIDOPA AND LEVODOPA 25; 100 MG/1; MG/1
TABLET ORAL
Qty: 180 TABLET | Refills: 1 | Status: SHIPPED | OUTPATIENT
Start: 2018-07-11 | End: 2019-02-06

## 2018-07-11 RX ORDER — HYDROCHLOROTHIAZIDE 25 MG/1
TABLET ORAL
Qty: 90 TABLET | Refills: 1 | Status: SHIPPED | OUTPATIENT
Start: 2018-07-11 | End: 2018-07-20

## 2018-07-11 RX ORDER — TRAZODONE HYDROCHLORIDE 100 MG/1
TABLET ORAL
Qty: 90 TABLET | Refills: 1 | Status: SHIPPED | OUTPATIENT
Start: 2018-07-11 | End: 2019-02-06

## 2018-07-11 RX ORDER — ASPIRIN 81 MG/1
162 TABLET ORAL DAILY
Qty: 60 TABLET | Refills: 11 | Status: SHIPPED | OUTPATIENT
Start: 2018-07-11 | End: 2019-05-16

## 2018-07-11 RX ORDER — LISINOPRIL 40 MG/1
40 TABLET ORAL DAILY
Qty: 90 TABLET | Refills: 1 | Status: SHIPPED | OUTPATIENT
Start: 2018-07-11 | End: 2018-07-20

## 2018-07-11 RX ORDER — ATORVASTATIN CALCIUM 80 MG/1
80 TABLET, FILM COATED ORAL DAILY
Qty: 90 TABLET | Refills: 1 | Status: SHIPPED | OUTPATIENT
Start: 2018-07-11 | End: 2018-07-20

## 2018-07-11 RX ORDER — ATENOLOL 50 MG/1
50 TABLET ORAL DAILY
Qty: 90 TABLET | Refills: 1 | Status: SHIPPED | OUTPATIENT
Start: 2018-07-11 | End: 2018-07-20

## 2018-07-11 RX ORDER — CEFADROXIL 500 MG/1
CAPSULE ORAL
Qty: 180 CAPSULE | Refills: 2 | Status: SHIPPED | OUTPATIENT
Start: 2018-07-11 | End: 2019-05-02

## 2018-07-11 NOTE — TELEPHONE ENCOUNTER
"Requested Prescriptions   Pending Prescriptions Disp Refills     atenolol (TENORMIN) 50 MG tablet 90 tablet 1     Sig: Take 1 tablet (50 mg) by mouth daily    Beta-Blockers Protocol Passed    7/11/2018  1:56 PM       Passed - Blood pressure under 140/90 in past 12 months    BP Readings from Last 3 Encounters:   12/04/17 125/68   11/20/17 128/72   08/22/17 126/64                Passed - Patient is age 6 or older       Passed - Recent (12 mo) or future (30 days) visit within the authorizing provider's specialty    Patient had office visit in the last 12 months or has a visit in the next 30 days with authorizing provider or within the authorizing provider's specialty.  See \"Patient Info\" tab in inbasket, or \"Choose Columns\" in Meds & Orders section of the refill encounter.       Last Written Prescription Date:  04/27/18  Last Fill Quantity: 04/27/18, 90 # refills: 1   Last office visit: 11/20/2017 with prescribing provider:   Future Office Visit:   Next 5 appointments (look out 90 days)     Jul 20, 2018  9:30 AM CDT   Return Visit with Donny Hua MD   [facility] ([facility])    [address] [address]   [phone]                   Next 5 appointments (look out 90 days)     Jul 20, 2018  9:30 AM CDT   Return Visit with Donny Hua MD   [facility] ([facility])    [address] [address]   [phone]                      hydrochlorothiazide (HYDRODIURIL) 25 MG tablet 90 tablet 1    Diuretics (Including Combos) Protocol Passed    7/11/2018  1:56 PM       Passed - Blood pressure under 140/90 in past 12 months    BP Readings from Last 3 Encounters:   12/04/17 125/68   11/20/17 128/72   08/22/17 126/64                Passed - Recent (12 mo) or future (30 days) visit within the authorizing provider's specialty    Patient had office visit in the last 12 months or " "has a visit in the next 30 days with authorizing provider or within the authorizing provider's specialty.  See \"Patient Info\" tab in inbasket, or \"Choose Columns\" in Meds & Orders section of the refill encounter.           Passed - Patient is age 18 or older       Passed - Normal serum creatinine on file in past 12 months    Recent Labs   Lab Test  11/20/17   0737   CR  0.81             Passed - Normal serum potassium on file in past 12 months    Recent Labs   Lab Test  11/20/17   0737   POTASSIUM  4.0                   Passed - Normal serum sodium on file in past 12 months    Recent Labs   Lab Test  11/20/17   0737   NA  140         Last Written Prescription Date:04/27/18  Last Fill Quantity: 90,  # refills: 1   Last office visit: 11/20/2017 with prescribing provider:Future Office Visit:   Next 5 appointments (look out 90 days)     Jul 20, 2018  9:30 AM CDT   Return Visit with Donny Hua MD   [facility] ([facility])    [address] [place] [address]   [phone]                      lisinopril (PRINIVIL/ZESTRIL) 40 MG tablet 90 tablet 1     Sig: Take 1 tablet (40 mg) by mouth daily    ACE Inhibitors (Including Combos) Protocol Passed    7/11/2018  1:56 PM       Passed - Blood pressure under 140/90 in past 12 months    BP Readings from Last 3 Encounters:   12/04/17 125/68   11/20/17 128/72   08/22/17 126/64                Passed - Recent (12 mo) or future (30 days) visit within the authorizing provider's specialty    Patient had office visit in the last 12 months or has a visit in the next 30 days with authorizing provider or within the authorizing provider's specialty.  See \"Patient Info\" tab in inbasket, or \"Choose Columns\" in Meds & Orders section of the refill encounter.           Passed - Patient is age 18 or older       Passed - Normal serum creatinine on file in past 12 months    Recent Labs   Lab Test  11/20/17   0737   CR  0.81      " "      Passed - Normal serum potassium on file in past 12 months    Recent Labs   Lab Test  11/20/17   0737   POTASSIUM  4.0        Last Written Prescription Date:  04/27/18  Last Fill Quantity: 90,  # refills: 1   Last office visit: 11/20/2017 with prescribing provider:     Future Office Visit:   Next 5 appointments (look out 90 days)     Jul 20, 2018  9:30 AM CDT   Return Visit with Donny Hua MD   [facility] ([facility])    [address] [address]   [phone]                 Next 5 appointments (look out 90 days)     Jul 20, 2018  9:30 AM CDT   Return Visit with Donny Hua MD   [facility] ([facility])    [address] [address]   [phone]                      cefadroxil (DURICEF) 500 MG capsule 180 capsule 2    There is no refill protocol information for this order   Last Written Prescription Date:  01/03/18  Last Fill Quantity: 180,  # refills: 2   Last office visit: 11/20/2017 with prescribing provider:     Future Office Visit:      aspirin 81 MG EC tablet 60 tablet 11     Sig: Take 2 tablets (162 mg) by mouth daily    Analgesics (Non-Narcotic Tylenol and ASA Only) Passed    7/11/2018  1:56 PM       Passed - Recent (12 mo) or future (30 days) visit within the authorizing provider's specialty    Patient had office visit in the last 12 months or has a visit in the next 30 days with authorizing provider or within the authorizing provider's specialty.  See \"Patient Info\" tab in inbasket, or \"Choose Columns\" in Meds & Orders section of the refill encounter.           Passed - Patient is age 20 years or older    If ASA is flagged for ages under 20 years old. Forward to provider for confirmation Ryes Syndrome is not a concern.         Last Written Prescription Date:  12/04/17  Last Fill Quantity: 60,  # refills: 11   Last office visit: 11/20/2017 with " "prescribing provider:     Future Office Visit:   Next 5 appointments (look out 90 days)     Jul 20, 2018  9:30 AM CDT   Return Visit with Donny Hua MD   [facility] ([facility])    [address] [place] [address]   [phone]                      carbidopa-levodopa (SINEMET)  MG per tablet 180 tablet 1    Antiparkinson's Agents Protocol Passed    7/11/2018  1:56 PM       Passed - Recent (12 mo) or future (30 days) visit within the authorizing provider's specialty    Patient had office visit in the last 12 months or has a visit in the next 30 days with authorizing provider or within the authorizing provider's specialty.  See \"Patient Info\" tab in inbasket, or \"Choose Columns\" in Meds & Orders section of the refill encounter.           Passed - Patient is age 18 or older   Last Written Prescription Date:  04/27/18  Last Fill Quantity: 180,  # refills: 1   Last office visit: 11/20/2017 with prescribing provider:     Future Office Visit:   Next 5 appointments (look out 90 days)     Jul 20, 2018  9:30 AM CDT   Return Visit with Donny Hua MD   [facility] ([facility])    [address] [place] [address]   [phone]                      traZODone (DESYREL) 100 MG tablet 90 tablet 1    Serotonin Modulators Passed    7/11/2018  1:56 PM       Passed - Recent (12 mo) or future (30 days) visit within the authorizing provider's specialty    Patient had office visit in the last 12 months or has a visit in the next 30 days with authorizing provider or within the authorizing provider's specialty.  See \"Patient Info\" tab in inbasket, or \"Choose Columns\" in Meds & Orders section of the refill encounter.           Passed - Patient is age 18 or older   Last Written Prescription Date:  04/27/18  Last Fill Quantity: 90,  # refills: 1   Last office visit: 11/20/2017 with prescribing " "provider:     Future Office Visit:   Next 5 appointments (look out 90 days)     Jul 20, 2018  9:30 AM CDT   Return Visit with Donny Hua MD   [facility] ([facility])    [address] [address]   [phone]                      atorvastatin (LIPITOR) 80 MG tablet 90 tablet 1     Sig: Take 1 tablet (80 mg) by mouth daily    Statins Protocol Passed    7/11/2018  1:56 PM       Passed - LDL on file in past 12 months    Recent Labs   Lab Test  11/20/17   0737   LDL  69            Passed - No abnormal creatine kinase in past 12 months    No lab results found.            Passed - Recent (12 mo) or future (30 days) visit within the authorizing provider's specialty    Patient had office visit in the last 12 months or has a visit in the next 30 days with authorizing provider or within the authorizing provider's specialty.  See \"Patient Info\" tab in inbasket, or \"Choose Columns\" in Meds & Orders section of the refill encounter.           Passed - Patient is age 18 or older      Last Written Prescription Date:  04/27/18  Last Fill Quantity: 90,  # refills: 1   Last office visit: 11/20/2017 with prescribing provider:     Future Office Visit:   Next 5 appointments (look out 90 days)     Jul 20, 2018  9:30 AM CDT   Return Visit with Donny Hua MD   [facility] ([facility])    [address] [address] [phone]                 Also requested but not on med list - Diphenhydramine with no dose or info - Meds were all written without qty or dose -    This is a mail order pharmacy so patient will need a 90 day supply  "

## 2018-07-16 NOTE — TELEPHONE ENCOUNTER
[facility] pharmacy says patient is requesting Rx for Diphenhydramine (Benadryl 50 mg) softgels.  Not listed on med list.

## 2018-07-16 NOTE — TELEPHONE ENCOUNTER
Not ok to refill since not on med list/never discussed.  This medication has potential problems for making urination difficult, memory issues and risk of falls.  Needs to be seen if he wishes as prescription.

## 2018-07-17 ENCOUNTER — HOSPITAL ENCOUNTER (OUTPATIENT)
Dept: CARDIOLOGY | Facility: CLINIC | Age: 73
Discharge: HOME | End: 2018-07-17
Attending: INTERNAL MEDICINE | Admitting: INTERNAL MEDICINE
Payer: COMMERCIAL

## 2018-07-17 DIAGNOSIS — E78.5 HYPERLIPIDEMIA WITH TARGET LDL LESS THAN 70: ICD-10-CM

## 2018-07-17 DIAGNOSIS — I25.10 CORONARY ARTERY DISEASE INVOLVING NATIVE CORONARY ARTERY OF NATIVE HEART WITHOUT ANGINA PECTORIS: ICD-10-CM

## 2018-07-17 DIAGNOSIS — I10 ESSENTIAL HYPERTENSION WITH GOAL BLOOD PRESSURE LESS THAN 140/90: ICD-10-CM

## 2018-07-17 LAB
ALT SERPL W P-5'-P-CCNC: 40 U/L (ref 0–70)
ANION GAP SERPL CALCULATED.3IONS-SCNC: 10 MMOL/L (ref 3–14)
BUN SERPL-MCNC: 15 MG/DL (ref 7–30)
CALCIUM SERPL-MCNC: 8.5 MG/DL (ref 8.5–10.1)
CHLORIDE SERPL-SCNC: 103 MMOL/L (ref 94–109)
CHOLEST SERPL-MCNC: 130 MG/DL
CO2 SERPL-SCNC: 25 MMOL/L (ref 20–32)
CREAT SERPL-MCNC: 0.9 MG/DL (ref 0.66–1.25)
GFR SERPL CREATININE-BSD FRML MDRD: 82 ML/MIN/1.7M2
GLUCOSE SERPL-MCNC: 100 MG/DL (ref 70–99)
HDLC SERPL-MCNC: 39 MG/DL
LDLC SERPL CALC-MCNC: 63 MG/DL
NONHDLC SERPL-MCNC: 91 MG/DL
POTASSIUM SERPL-SCNC: 3.7 MMOL/L (ref 3.4–5.3)
SODIUM SERPL-SCNC: 138 MMOL/L (ref 133–144)
TRIGL SERPL-MCNC: 139 MG/DL

## 2018-07-17 PROCEDURE — 80048 BASIC METABOLIC PNL TOTAL CA: CPT | Performed by: INTERNAL MEDICINE

## 2018-07-17 PROCEDURE — 80061 LIPID PANEL: CPT | Performed by: INTERNAL MEDICINE

## 2018-07-17 PROCEDURE — 36415 COLL VENOUS BLD VENIPUNCTURE: CPT | Performed by: INTERNAL MEDICINE

## 2018-07-17 PROCEDURE — 84460 ALANINE AMINO (ALT) (SGPT): CPT | Performed by: INTERNAL MEDICINE

## 2018-07-17 PROCEDURE — 40000264 ECHO COMPLETE WITH OPTISON

## 2018-07-17 PROCEDURE — 93306 TTE W/DOPPLER COMPLETE: CPT | Mod: 26 | Performed by: INTERNAL MEDICINE

## 2018-07-17 PROCEDURE — 25500064 ZZH RX 255 OP 636: Performed by: INTERNAL MEDICINE

## 2018-07-17 RX ADMIN — HUMAN ALBUMIN MICROSPHERES AND PERFLUTREN 2 ML: 10; .22 INJECTION, SOLUTION INTRAVENOUS at 09:29

## 2018-07-20 ENCOUNTER — OFFICE VISIT (OUTPATIENT)
Dept: CARDIOLOGY | Facility: CLINIC | Age: 73
End: 2018-07-20
Attending: INTERNAL MEDICINE
Payer: COMMERCIAL

## 2018-07-20 VITALS
BODY MASS INDEX: 35.89 KG/M2 | SYSTOLIC BLOOD PRESSURE: 125 MMHG | HEART RATE: 57 BPM | OXYGEN SATURATION: 97 % | DIASTOLIC BLOOD PRESSURE: 70 MMHG | WEIGHT: 236.8 LBS

## 2018-07-20 DIAGNOSIS — I25.10 CORONARY ARTERY DISEASE INVOLVING NATIVE CORONARY ARTERY OF NATIVE HEART WITHOUT ANGINA PECTORIS: ICD-10-CM

## 2018-07-20 DIAGNOSIS — I25.119 CORONARY ARTERY DISEASE INVOLVING NATIVE HEART WITH ANGINA PECTORIS, UNSPECIFIED VESSEL OR LESION TYPE (H): ICD-10-CM

## 2018-07-20 DIAGNOSIS — E78.5 HYPERLIPIDEMIA WITH TARGET LDL LESS THAN 70: ICD-10-CM

## 2018-07-20 DIAGNOSIS — I10 ESSENTIAL HYPERTENSION WITH GOAL BLOOD PRESSURE LESS THAN 140/90: ICD-10-CM

## 2018-07-20 PROCEDURE — 99214 OFFICE O/P EST MOD 30 MIN: CPT | Performed by: INTERNAL MEDICINE

## 2018-07-20 RX ORDER — LISINOPRIL 40 MG/1
40 TABLET ORAL DAILY
Qty: 90 TABLET | Refills: 3 | Status: SHIPPED | OUTPATIENT
Start: 2018-07-20 | End: 2019-02-06

## 2018-07-20 RX ORDER — ATORVASTATIN CALCIUM 80 MG/1
80 TABLET, FILM COATED ORAL DAILY
Qty: 90 TABLET | Refills: 3 | Status: SHIPPED | OUTPATIENT
Start: 2018-07-20 | End: 2018-07-20

## 2018-07-20 RX ORDER — ATORVASTATIN CALCIUM 80 MG/1
80 TABLET, FILM COATED ORAL DAILY
Qty: 90 TABLET | Refills: 3 | Status: SHIPPED | OUTPATIENT
Start: 2018-07-20 | End: 2019-05-16

## 2018-07-20 RX ORDER — HYDROCHLOROTHIAZIDE 25 MG/1
25 TABLET ORAL EVERY MORNING
Qty: 90 TABLET | Refills: 3 | Status: SHIPPED | OUTPATIENT
Start: 2018-07-20 | End: 2018-07-20

## 2018-07-20 RX ORDER — HYDROCHLOROTHIAZIDE 25 MG/1
25 TABLET ORAL EVERY MORNING
Qty: 90 TABLET | Refills: 3 | Status: SHIPPED | OUTPATIENT
Start: 2018-07-20 | End: 2019-05-16

## 2018-07-20 RX ORDER — NITROGLYCERIN 0.4 MG/1
0.4 TABLET SUBLINGUAL EVERY 5 MIN PRN
Qty: 25 TABLET | Refills: 3 | Status: SHIPPED | OUTPATIENT
Start: 2018-07-20

## 2018-07-20 RX ORDER — ATENOLOL 50 MG/1
50 TABLET ORAL DAILY
Qty: 90 TABLET | Refills: 3 | Status: SHIPPED | OUTPATIENT
Start: 2018-07-20 | End: 2019-05-16

## 2018-07-20 RX ORDER — ATENOLOL 50 MG/1
50 TABLET ORAL DAILY
Qty: 90 TABLET | Refills: 3 | Status: SHIPPED | OUTPATIENT
Start: 2018-07-20 | End: 2018-07-20

## 2018-07-20 RX ORDER — LISINOPRIL 40 MG/1
40 TABLET ORAL DAILY
Qty: 90 TABLET | Refills: 3 | Status: SHIPPED | OUTPATIENT
Start: 2018-07-20 | End: 2018-07-20

## 2018-07-20 NOTE — MR AVS SNAPSHOT
"              After Visit Summary   7/20/2018    Cas Noriega    MRN: [id number]           Patient Information     Date Of Birth          1945        Visit Information        Provider Department      7/20/2018 9:30 AM Donny Hua MD [facility]        Today's Diagnoses     Coronary artery disease involving native coronary artery of native heart without angina pectoris        Essential hypertension with goal blood pressure less than 140/90        Hyperlipidemia with target LDL less than 70          Care Instructions    [facility]~[address]~[place], [place]~[address]  Thank you for your  Heart Care visit today. If you have questions regarding your visit, please contact your cardiology RN's, Mreyl Shay or Zee Cosby, at [phone]. Your provider has recommended the following:  Medication Changes:  None today. Continue taking your medications as you have been.  Recommendations:  As discussed at your office visit today with Dr. Hua.  Follow-up:  1. Make an appointment to see Mayuri Lawson PA-C for cardiology follow up in 3 months, around October, with a non-fasting lab to be done 1-2 days prior to this revisit.  2. Make an appointment to see Dr. Dumont or Dr. David for cardiology follow up in around 9 months or when back from [place] with fasting labs to be done 1-2 days prior to this revisit.    To schedule a future appointment, we kindly ask that you call cardiology scheduling at [phone] three months prior to requested revisit date.  We are staffed with \"Advance Practice Providers\". These are our cardiology Physician Assistants and Nurse Practitioners. Please call scheduling if you feel you need clinical evaluation with them at any time for any cardiac reason.                 Reminder:  For your safety, we ask that you bring in your current medication(s) or an updated " list of your medications with you to EACH office visit. Include the medication name, dose of pill on bottle and how you are taking it. Include over-the-counter medications or supplements. Your provider will review this at each visit and plan your care based on your current information.               Follow-ups after your visit        Additional Services     Follow-Up with Cardiac Advanced Practice Provider           Follow-Up with Cardiologist                 Future tests that were ordered for you today     Open Future Orders        Priority Expected Expires Ordered    Basic metabolic panel Routine 4/16/2019 7/20/2019 7/20/2018    Lipid Profile Routine 4/16/2019 7/20/2019 7/20/2018    ALT Routine 4/16/2019 7/20/2019 7/20/2018    Follow-Up with Cardiologist Routine 4/16/2019 7/20/2019 7/20/2018    Basic metabolic panel Routine 10/18/2018 7/20/2019 7/20/2018    Follow-Up with Cardiac Advanced Practice Provider Routine 10/18/2018 7/20/2019 7/20/2018            Who to contact     If you have questions or need follow up information about today's clinic visit or your schedule please contact [facility] directly at [phone].  Normal or non-critical lab and imaging results will be communicated to you by MyChart, letter or phone within 4 business days after the clinic has received the results. If you do not hear from us within 7 days, please contact the clinic through MyChart or phone. If you have a critical or abnormal lab result, we will notify you by phone as soon as possible.  Submit refill requests through [employer] or call your pharmacy and they will forward the refill request to us. Please allow 3 business days for your refill to be completed.          Additional Information About Your Visit        Care EveryWhere ID     This is your Care EveryWhere ID. This could be used by other organizations to access your [facility] medical records  [id number]        Your Vitals Were      Pulse Pulse Oximetry BMI (Body Mass Index)             57 97% 35.89 kg/m2          Blood Pressure from Last 3 Encounters:   07/20/18 125/70   12/04/17 125/68   11/20/17 128/72    Weight from Last 3 Encounters:   07/20/18 107.4 kg (236 lb 12.8 oz)   12/04/17 107.5 kg (237 lb)   11/20/17 103.9 kg (229 lb)              We Performed the Following     Follow-Up with Cardiologist        Primary Care Provider Office Phone # Fax #    Sera Garcia PA-C [phone] [address] [place] [address]        Equal Access to Services     MARIA A ARANA : Hadii glory Fong, wacarloz alfaro, qasergiota kaalmada rowan, rio vela. So [facility] [phone].    ATENCIÓN: Si habla español, tiene a holbrook disposición servicios gratuitos de asistencia lingüística. Llame al [phone].    We comply with applicable federal civil rights laws and [place] laws. We do not discriminate on the basis of race, color, national origin, age, disability, sex, sexual orientation, or gender identity.            Thank you!     Thank you for choosing [facility]  for your care. Our goal is always to provide you with excellent care. Hearing back from our patients is one way we can continue to improve our services. Please take a few minutes to complete the written survey that you may receive in the mail after your visit with us. Thank you!             Your Updated Medication List - Protect others around you: Learn how to safely use, store and throw away your medicines at www.disposemymeds.org.          This list is accurate as of 7/20/18 10:14 AM.  Always use your most recent med list.                   Brand Name Dispense Instructions for use Diagnosis    ACETAMINOPHEN PO      Take 500 mg by mouth 2 every 6 hours, morning, noon and night. As Needed for shoulder.        albuterol 108 (90 Base) MCG/ACT Inhaler    PROAIR HFA/PROVENTIL HFA/VENTOLIN HFA     1 Inhaler    Inhale 2 puffs into the lungs every 6 hours as needed for shortness of breath / dyspnea or wheezing    Intermittent asthma, uncomplicated       aspirin 81 MG EC tablet     60 tablet    Take 2 tablets (162 mg) by mouth daily    Coronary artery disease involving native coronary artery of native heart without angina pectoris       atenolol 50 MG tablet    TENORMIN    90 tablet    Take 1 tablet (50 mg) by mouth daily    Essential hypertension with goal blood pressure less than 140/90       atorvastatin 80 MG tablet    LIPITOR    90 tablet    Take 1 tablet (80 mg) by mouth daily    Hyperlipidemia with target LDL less than 70       carbidopa-levodopa  MG per tablet    SINEMET    180 tablet    TAKE 2 TABLETS BY MOUTH DAILY AT BEDTIME.    Restless legs syndrome (RLS)       cefadroxil 500 MG capsule    DURICEF    180 capsule    TAKE ONE CAPSULE BY MOUTH TWICE A DAY    Prosthetic joint infection, sequela       fluorouracil 5 % cream    EFUDEX    40 g    Apply topically 2 times daily    Actinic keratosis       fluticasone 50 MCG/ACT spray    FLONASE    48 g    USE ONE TO TWO SPRAYS INTO BOTH NOSTRILS ONCE DAILY    Chronic nasal congestion       hydrochlorothiazide 25 MG tablet    HYDRODIURIL    90 tablet    TAKE 1 TABLET BY MOUTH EVERY MORNING.    Essential hypertension with goal blood pressure less than 140/90       lactobacillus rhamnosus (GG) capsule     90 capsule    Take 1 capsule by mouth daily    Encounter for long-term (current) use of antibiotics       lisinopril 40 MG tablet    PRINIVIL/ZESTRIL    90 tablet    Take 1 tablet (40 mg) by mouth daily    Essential hypertension with goal blood pressure less than 140/90       nitroGLYcerin 0.4 MG sublingual tablet    NITROSTAT    30 tablet    Place 1 tablet (0.4 mg) under the tongue every 5 minutes as needed for chest pain    Coronary artery disease involving native heart with angina pectoris, unspecified vessel or lesion type (H)       order for DME       Equipment being ordered: CPAP Cas Noriega received a Resmed AirSense 10 CPAP. Pressures were set at Auto 11 - 15 cm H2O.        order for DME     1 Device    one digital automatic Home blood pressure machine ( per insurance)    Unspecified essential hypertension       rOPINIRole 0.5 MG tablet    REQUIP    60 tablet    Take 1-2 tablets (0.5-1 mg) by mouth At Bedtime    Restless leg syndrome       traZODone 100 MG tablet    DESYREL    90 tablet    TAKE ONE TABLET BY MOUTH EVERY NIGHT AT BEDTIME    Insomnia, unspecified type

## 2018-07-20 NOTE — PROGRESS NOTES
Service Date: 07/20/2018      HISTORY OF PRESENT ILLNESS:  The patient is a 72-year-old overweight white male with a history of ischemic heart disease dating back to 2007 when an inferior wall myocardial infarction treated emergently at [facility] with PTCI and 2 stents placed.  He was subsequently scheduled for bypass surgery for multivessel disease.  He underwent that procedure without difficulty, although there are minimal records available.        He had previous history of hyperlipidemia, hypertension, sleep apnea and previous history of cigarette smoking with restless leg syndrome.  He has been able to participate in most activities without significant restriction.  He has had a significant decrease in hearing and is now using hearing aids using bone transition for sound on both left and right sides.        He denies peg chest discomfort, shortness of breath, dizziness, palpitations, nausea, vomiting, diaphoresis or syncope.  He has not had any symptoms of PND, orthopnea, fever, chills or sweats.        Lexiscan Cardiolite stress test in 2017 demonstrated mid to basilar inferior inferolateral scar without evidence of ischemia.  Gating demonstrated a normal-sized left ventricle with mild inferior wall hypokinesis and ejection fraction that was low normal at 51% with no significant change from 2015.  Echocardiography this year demonstrated a normal-sized left ventricle with intact systolic function.  Ejection fraction 55%-60%, no significant wall motion abnormality, no significant valvular insufficiency or stenosis with right ventricle normal in size and function.  Again, he is able to participate in most activities without significant restriction.  He does get up too quickly, he may have mild lightheadedness.      MEDICATIONS:   1.  Acetaminophen 1000 mg every 6 hours as needed.   2.  Albuterol inhaler as directed.   3.  Aspirin 162 mg a day.   4.  Atenolol 50 mg a day.   5.  Atorvastatin 80  mg a day.   6.  Carbidopa/levodopa 25/100 mg 2 tablets at bedtime.   7.  Duricef 500 mg twice a day.   8.  Flonase as directed.   9.  Hydrochlorothiazide 25 mg a day.   10.  Lisinopril 40 mg a day.   11.  Probiotic 1 per day.   12.  Nitroglycerin 0.4 mg sublingual p.r.n. as needed.   13.  Requip 0.5-1 mg at bedtime.   14.  Trazodone 100 mg at bedtime for sleep.      LABORATORY DATA:  Demonstrated cholesterol 130, HDL 39, LDL 63, triglyceride 139.  Sodium 138, potassium 3.7, BUN 15, creatinine 0.9.  ALT 40.      The patient presents to Cardiology Clinic for followup of ischemic heart disease.      PHYSICAL EXAMINATION:   VITAL SIGNS:  Blood pressure 125/70 with a heart rate of 50-60 and regular.  Weight was 236 pounds, which is stable.   NECK:  Without jugular venous distention, carotid bruit or palpable thyroid.   CHEST:  Essentially clear to percussion and auscultation with slight decreased breath sounds at the bases, slightly prolonged expiratory phase and isolated basilar crackles.   CARDIAC:  Regular rhythm, soft S4 gallop, 1-2/6 systolic murmur at the left sternal border main radiating to the apex.  No diastolic murmur, rub or S3.   EXTREMITIES:  Without cyanosis or edema.      CLINICAL IMPRESSION:   1.  Stable cardiac condition.   2.  Ischemic heart disease, status post inferior wall myocardial infarction with acute PTCI and stent x2 in the right coronary in 2007 with subsequent bypass surgery for multivessel disease in 2008.   3.  Distant history of cigarette smoking.   4.  History of hypertension, well controlled.   5.  Hyperlipidemia, at goal with slight HDL deficiency.   6.  Probable sleep apnea, not on regular CPAP.   7.  History of peripheral vascular disease.   8.  History of anxiety, depression.   9.  History of cataracts.   10.  History of osteoarthritis.   11.  History of hearing loss treated with bone conduction aids.      DISCUSSION:  The patient has done quite well with regards to his cardiac  status.  He has had no symptoms of angina pectoris or congestive heart failure.  He will need close followup of his serum lipids, basic metabolic panel and blood pressure.  He has had no evidence of ischemia by Cardiolite stress testing in has intact left ventricular function by echocardiography.  Overall, he appears stable and satisfied with his present lifestyle.      RECOMMENDATIONS:   1.  Continue present medications.   2.  Close followup of serum lipids, basic metabolic panel and blood pressure with followup with Mayuri Lawson in 3 months.   3.  Diet and exercise program as tolerated.   4.  Orthopedic and hearing followup.   5.  Consider sleep evaluation.   6.  Routine medical followup.   7.  Cardiology followup in 9 months.      cc:      [place]era [place] [address]         DUSTIN ZHENG MD, [facility]             D: 2018   T: 2018   MT: MARK      Name:     CHARLES SANCHEZ   MRN:      [id number]        Account:      [id number]   :      1945           Service Date: 2018      Document: [id number]

## 2018-07-20 NOTE — LETTER
7/20/2018    Sera Garcia PA-C  [address] [place] [id number]    RE: Cas Noriega       Dear Colleague,    I had the pleasure of seeing Cas Noriega in the [facility].    Service Date: 07/20/2018      HISTORY OF PRESENT ILLNESS:  The patient is a 72-year-old overweight white male with a history of ischemic heart disease dating back to 2007 when an inferior wall myocardial infarction treated emergently at [facility] with PTCI and 2 stents placed.  He was subsequently scheduled for bypass surgery for multivessel disease.  He underwent that procedure without difficulty, although there are minimal records available.        He had previous history of hyperlipidemia, hypertension, sleep apnea and previous history of cigarette smoking with restless leg syndrome.  He has been able to participate in most activities without significant restriction.  He has had a significant decrease in hearing and is now using hearing aids using bone transition for sound on both left and right sides.        He denies peg chest discomfort, shortness of breath, dizziness, palpitations, nausea, vomiting, diaphoresis or syncope.  He has not had any symptoms of PND, orthopnea, fever, chills or sweats.        Lexiscan Cardiolite stress test in 2017 demonstrated mid to basilar inferior inferolateral scar without evidence of ischemia.  Gating demonstrated a normal-sized left ventricle with mild inferior wall hypokinesis and ejection fraction that was low normal at 51% with no significant change from 2015.  Echocardiography this year demonstrated a normal-sized left ventricle with intact systolic function.  Ejection fraction 55%-60%, no significant wall motion abnormality, no significant valvular insufficiency or stenosis with right ventricle normal in size and function.  Again, he is able to participate in most activities without significant restriction.  He does get up  too quickly, he may have mild lightheadedness.      MEDICATIONS:   1.  Acetaminophen 1000 mg every 6 hours as needed.   2.  Albuterol inhaler as directed.   3.  Aspirin 162 mg a day.   4.  Atenolol 50 mg a day.   5.  Atorvastatin 80 mg a day.   6.  Carbidopa/levodopa 25/100 mg 2 tablets at bedtime.   7.  Duricef 500 mg twice a day.   8.  Flonase as directed.   9.  Hydrochlorothiazide 25 mg a day.   10.  Lisinopril 40 mg a day.   11.  Probiotic 1 per day.   12.  Nitroglycerin 0.4 mg sublingual p.r.n. as needed.   13.  Requip 0.5-1 mg at bedtime.   14.  Trazodone 100 mg at bedtime for sleep.      LABORATORY DATA:  Demonstrated cholesterol 130, HDL 39, LDL 63, triglyceride 139.  Sodium 138, potassium 3.7, BUN 15, creatinine 0.9.  ALT 40.      The patient presents to Cardiology Clinic for followup of ischemic heart disease.      PHYSICAL EXAMINATION:   VITAL SIGNS:  Blood pressure 125/70 with a heart rate of 50-60 and regular.  Weight was 236 pounds, which is stable.   NECK:  Without jugular venous distention, carotid bruit or palpable thyroid.   CHEST:  Essentially clear to percussion and auscultation with slight decreased breath sounds at the bases, slightly prolonged expiratory phase and isolated basilar crackles.   CARDIAC:  Regular rhythm, soft S4 gallop, 1-2/6 systolic murmur at the left sternal border main radiating to the apex.  No diastolic murmur, rub or S3.   EXTREMITIES:  Without cyanosis or edema.      CLINICAL IMPRESSION:   1.  Stable cardiac condition.   2.  Ischemic heart disease, status post inferior wall myocardial infarction with acute PTCI and stent x2 in the right coronary in 2007 with subsequent bypass surgery for multivessel disease in 2008.   3.  Distant history of cigarette smoking.   4.  History of hypertension, well controlled.   5.  Hyperlipidemia, at goal with slight HDL deficiency.   6.  Probable sleep apnea, not on regular CPAP.   7.  History of peripheral vascular disease.   8.  History  of anxiety, depression.   9.  History of cataracts.   10.  History of osteoarthritis.   11.  History of hearing loss treated with bone conduction aids.      DISCUSSION:  The patient has done quite well with regards to his cardiac status.  He has had no symptoms of angina pectoris or congestive heart failure.  He will need close followup of his serum lipids, basic metabolic panel and blood pressure.  He has had no evidence of ischemia by Cardiolite stress testing in has intact left ventricular function by echocardiography.  Overall, he appears stable and satisfied with his present lifestyle.      RECOMMENDATIONS:   1.  Continue present medications.   2.  Close followup of serum lipids, basic metabolic panel and blood pressure with followup with Mayuri Lawson in 3 months.   3.  Diet and exercise program as tolerated.   4.  Orthopedic and hearing followup.   5.  Consider sleep evaluation.   6.  Routine medical followup.   7.  Cardiology followup in 9 months.      cc:      Sera Garcia PA-C   [place] [id number] [address] [place], [place] [id number]         DUSTIN ZHENG MD, [facility]             D: 2018   T: 2018   MT:       Name:     CHARLES SANCHEZ   MRN:      [id number]        Account:      [id number]   :      1945           Service Date: 2018      Document: [id number]       Thank you for allowing me to participate in the care of your patient.      Sincerely,     Dustin Zheng MD     [facility]

## 2018-07-20 NOTE — PATIENT INSTRUCTIONS
"[facility]  [facility]~[address]~[place], [place]~[address]  Thank you for your [facility] visit today. If you have questions regarding your visit, please contact your cardiology RN's, Meryl Shay or Zee Cosby, at [phone]. Your provider has recommended the following:  Medication Changes:  None today. Continue taking your medications as you have been.  Recommendations:  As discussed at your office visit today with Dr. Hua.  Follow-up:  1. Make an appointment to see Mayuri Lawson PA-C for cardiology follow up in 3 months, around October, with a non-fasting lab to be done 1-2 days prior to this revisit.  2. Make an appointment to see Dr. Dumont or Dr. David for cardiology follow up in around 9 months or when back from [place] with fasting labs to be done 1-2 days prior to this revisit.    To schedule a future appointment, we kindly ask that you call cardiology scheduling at [phone] three months prior to requested revisit date.  We are staffed with \"Advance Practice Providers\". These are our cardiology Physician Assistants and Nurse Practitioners. Please call scheduling if you feel you need clinical evaluation with them at any time for any cardiac reason.                 Reminder:  For your safety, we ask that you bring in your current medication(s) or an updated list of your medications with you to EACH office visit. Include the medication name, dose of pill on bottle and how you are taking it. Include over-the-counter medications or supplements. Your provider will review this at each visit and plan your care based on your current information.       "

## 2018-07-24 DIAGNOSIS — R09.81 CHRONIC NASAL CONGESTION: ICD-10-CM

## 2018-07-24 RX ORDER — FLUTICASONE PROPIONATE 50 MCG
SPRAY, SUSPENSION (ML) NASAL
Qty: 48 G | Refills: 0 | Status: SHIPPED | OUTPATIENT
Start: 2018-07-24 | End: 2018-07-26

## 2018-07-26 DIAGNOSIS — R09.81 CHRONIC NASAL CONGESTION: ICD-10-CM

## 2018-07-26 RX ORDER — FLUTICASONE PROPIONATE 50 MCG
SPRAY, SUSPENSION (ML) NASAL
Qty: 48 G | Refills: 0 | Status: SHIPPED | OUTPATIENT
Start: 2018-07-26 | End: 2018-08-01

## 2018-07-26 NOTE — TELEPHONE ENCOUNTER
"Requested Prescriptions   Pending Prescriptions Disp Refills     fluticasone (FLONASE) 50 MCG/ACT spray 48 g 0     Sig: USE ONE TO TWO SPRAYS INTO BOTH NOSTRILS ONCE DAILY    Inhaled Steroids Protocol Failed    7/26/2018  9:09 AM       Failed - Asthma control assessment score within normal limits in last 6 months    Please review ACT score.          Failed - Recent (6 mo) or future (30 days) visit within the authorizing provider's specialty    Patient had office visit in the last 6 months or has a visit in the next 30 days with authorizing provider or within the authorizing provider's specialty.  See \"Patient Info\" tab in inbasket, or \"Choose Columns\" in Meds & Orders section of the refill encounter.           Passed - Patient is age 12 or older        Last Written Prescription Date:  7/24/18  Last Fill Quantity: 48g,  # refills: 0   Last office visit: 11/20/2017 with prescribing provider:  Jose   Future Office Visit:   Next 5 appointments (look out 90 days)     Oct 16, 2018 10:00 AM CDT   Return Visit with Mayuri Lawson PA-C   [facility] ([facility])    [address] [place] [id number] [phone]                   "

## 2018-08-01 DIAGNOSIS — R09.81 CHRONIC NASAL CONGESTION: ICD-10-CM

## 2018-08-01 RX ORDER — FLUTICASONE PROPIONATE 50 MCG
SPRAY, SUSPENSION (ML) NASAL
Qty: 48 G | Refills: 0 | Status: SHIPPED | OUTPATIENT
Start: 2018-08-01 | End: 2019-05-07

## 2018-08-01 NOTE — TELEPHONE ENCOUNTER
"Requested Prescriptions   Pending Prescriptions Disp Refills     fluticasone (FLONASE) 50 MCG/ACT spray 48 g 0     Sig: USE ONE TO TWO SPRAYS INTO BOTH NOSTRILS ONCE DAILY    Inhaled Steroids Protocol Failed    8/1/2018  9:25 AM       Failed - Asthma control assessment score within normal limits in last 6 months    Please review ACT score.          Failed - Recent (6 mo) or future (30 days) visit within the authorizing provider's specialty    Patient had office visit in the last 6 months or has a visit in the next 30 days with authorizing provider or within the authorizing provider's specialty.  See \"Patient Info\" tab in inbasket, or \"Choose Columns\" in Meds & Orders section of the refill encounter.           Passed - Patient is age 12 or older        Last Written Prescription Date:  7/26/18  Last Fill Quantity: 48g,  # refills: 0   Last office visit: 11/20/2017 with prescribing provider:  Jose   Future Office Visit:   Next 5 appointments (look out 90 days)     Oct 16, 2018 10:00 AM CDT   Return Visit with Mayuri Lawson PA-C   [facility] ([facility])    [address] [place] [id number] [phone]                   "

## 2018-08-09 ENCOUNTER — OFFICE VISIT (OUTPATIENT)
Dept: FAMILY MEDICINE | Facility: CLINIC | Age: 73
End: 2018-08-09
Payer: COMMERCIAL

## 2018-08-09 VITALS
RESPIRATION RATE: 18 BRPM | DIASTOLIC BLOOD PRESSURE: 70 MMHG | TEMPERATURE: 98.3 F | BODY MASS INDEX: 36.07 KG/M2 | SYSTOLIC BLOOD PRESSURE: 130 MMHG | HEART RATE: 72 BPM | WEIGHT: 238 LBS

## 2018-08-09 DIAGNOSIS — L81.9 DISCOLORATION OF SKIN OF TOE: Primary | ICD-10-CM

## 2018-08-09 PROCEDURE — 99213 OFFICE O/P EST LOW 20 MIN: CPT | Performed by: PHYSICIAN ASSISTANT

## 2018-08-09 ASSESSMENT — ENCOUNTER SYMPTOMS
DYSURIA: 0
CONSTITUTIONAL NEGATIVE: 1
CHILLS: 0
EYE REDNESS: 0
LIGHT-HEADEDNESS: 0
EYES NEGATIVE: 1
CARDIOVASCULAR NEGATIVE: 1
DIARRHEA: 0
CHEST TIGHTNESS: 0
NAUSEA: 0
PALPITATIONS: 0
HEMATURIA: 0
EYE DISCHARGE: 0
POLYDIPSIA: 0
NEUROLOGICAL NEGATIVE: 1
FREQUENCY: 0
EYE ITCHING: 0
VOMITING: 0
COUGH: 0
RESPIRATORY NEGATIVE: 1
FEVER: 0
COLOR CHANGE: 1
WHEEZING: 0
DIZZINESS: 0
SHORTNESS OF BREATH: 0
SORE THROAT: 0
MUSCULOSKELETAL NEGATIVE: 1
HEADACHES: 0
ADENOPATHY: 0
ENDOCRINE NEGATIVE: 1
ABDOMINAL PAIN: 0
MYALGIAS: 0
WEAKNESS: 0
DIAPHORESIS: 0
RHINORRHEA: 0
GASTROINTESTINAL NEGATIVE: 1

## 2018-08-09 NOTE — MR AVS SNAPSHOT
After Visit Summary   8/9/2018    Cas Noriega    MRN: [id number]           Patient Information     Date Of Birth          1945        Visit Information        Provider Department      8/9/2018 4:20 PM Aubrey Lui PA-C [facility]        Today's Diagnoses     Discoloration of skin of toe    -  1       Follow-ups after your visit        Your next 10 appointments already scheduled     Oct 12, 2018  9:30 AM CDT   LAB with [facility]   [facility] ([facility])    [address] [place] [id number] [phone]           Please do not eat 10-12 hours before your appointment if you are coming in fasting for labs on lipids, cholesterol, or glucose (sugar). This does not apply to pregnant women. Water, hot tea and black coffee (with nothing added) are okay. Do not drink other fluids, diet soda or chew gum.            Oct 16, 2018 10:00 AM CDT   Return Visit with Mayuri Lawson PA-C   [facility] ([facility])    [address] [address] [phone]              Who to contact     If you have questions or need follow up information about today's clinic visit or your schedule please contact [facility] directly at [phone].  Normal or non-critical lab and imaging results will be communicated to you by MyChart, letter or phone within 4 business days after the clinic has received the results. If you do not hear from us within 7 days, please contact the clinic through MyChart or phone. If you have a critical or abnormal lab result, we will notify you by phone as soon as possible.  Submit refill requests through [employer] or call your pharmacy and they will forward the refill request to us. Please allow 3 business days for your refill to be completed.          Additional Information About Your Visit        Care EveryWhere ID     This is  your Care EveryWhere ID. This could be used by other organizations to access your [place] medical records  [id number]        Your Vitals Were     Pulse Temperature Respirations BMI (Body Mass Index)          72 98.3  F (36.8  C) (Tympanic) 18 36.07 kg/m2         Blood Pressure from Last 3 Encounters:   08/09/18 130/70   07/20/18 125/70   12/04/17 125/68    Weight from Last 3 Encounters:   08/09/18 238 lb (108 kg)   07/20/18 236 lb 12.8 oz (107.4 kg)   12/04/17 237 lb (107.5 kg)              Today, you had the following     No orders found for display       Primary Care Provider Office Phone # Fax #    Sera Garcia PA-C [phone] [address] [facility] [address]        Equal Access to Services     MARIA A ARANA : Hadii glory darbyo Somaira, waaxda luqadaha, qaybta kaalmada adenileshyada, rio steel . So [facility] [phone].    ATENCIÓN: Si habla español, tiene a hoblrook disposición servicios gratuitos de asistencia lingüística. Llame al [phone].    We comply with applicable federal civil rights laws and [place] laws. We do not discriminate on the basis of race, color, national origin, age, disability, sex, sexual orientation, or gender identity.            Thank you!     Thank you for choosing [facility]  for your care. Our goal is always to provide you with excellent care. Hearing back from our patients is one way we can continue to improve our services. Please take a few minutes to complete the written survey that you may receive in the mail after your visit with us. Thank you!             Your Updated Medication List - Protect others around you: Learn how to safely use, store and throw away your medicines at www.disposemymeds.org.          This list is accurate as of 8/9/18  5:17 PM.  Always use your most recent med list.                   Brand Name Dispense Instructions for use Diagnosis    ACETAMINOPHEN PO      Take 500 mg by  mouth 2 every 6 hours, morning, noon and night. As Needed for shoulder.        albuterol 108 (90 Base) MCG/ACT Inhaler    PROAIR HFA/PROVENTIL HFA/VENTOLIN HFA    1 Inhaler    Inhale 2 puffs into the lungs every 6 hours as needed for shortness of breath / dyspnea or wheezing    Intermittent asthma, uncomplicated       aspirin 81 MG EC tablet     60 tablet    Take 2 tablets (162 mg) by mouth daily    Coronary artery disease involving native coronary artery of native heart without angina pectoris       atenolol 50 MG tablet    TENORMIN    90 tablet    Take 1 tablet (50 mg) by mouth daily    Essential hypertension with goal blood pressure less than 140/90       atorvastatin 80 MG tablet    LIPITOR    90 tablet    Take 1 tablet (80 mg) by mouth daily    Hyperlipidemia with target LDL less than 70       carbidopa-levodopa  MG per tablet    SINEMET    180 tablet    TAKE 2 TABLETS BY MOUTH DAILY AT BEDTIME.    Restless legs syndrome (RLS)       cefadroxil 500 MG capsule    DURICEF    180 capsule    TAKE ONE CAPSULE BY MOUTH TWICE A DAY    Prosthetic joint infection, sequela       fluorouracil 5 % cream    EFUDEX    40 g    Apply topically 2 times daily    Actinic keratosis       fluticasone 50 MCG/ACT spray    FLONASE    48 g    USE ONE TO TWO SPRAYS INTO BOTH NOSTRILS ONCE DAILY    Chronic nasal congestion       hydrochlorothiazide 25 MG tablet    HYDRODIURIL    90 tablet    Take 1 tablet (25 mg) by mouth every morning    Essential hypertension with goal blood pressure less than 140/90       lactobacillus rhamnosus (GG) capsule     90 capsule    Take 1 capsule by mouth daily    Encounter for long-term (current) use of antibiotics       lisinopril 40 MG tablet    PRINIVIL/ZESTRIL    90 tablet    Take 1 tablet (40 mg) by mouth daily    Essential hypertension with goal blood pressure less than 140/90       nitroGLYcerin 0.4 MG sublingual tablet    NITROSTAT    25 tablet    Place 1 tablet (0.4 mg) under the tongue every  5 minutes as needed for chest pain May repeat x 3. If no relief, call 911.    Coronary artery disease involving native heart with angina pectoris, unspecified vessel or lesion type (H)       order for DME      Equipment being ordered: CPAP Cas Noriega received a [employer] AirSense 10 CPAP. Pressures were set at Auto 11 - 15 cm H2O.        order for DME     1 Device    one digital automatic Home blood pressure machine ( per insurance)    Unspecified essential hypertension       rOPINIRole 0.5 MG tablet    REQUIP    60 tablet    Take 1-2 tablets (0.5-1 mg) by mouth At Bedtime    Restless leg syndrome       traZODone 100 MG tablet    DESYREL    90 tablet    TAKE ONE TABLET BY MOUTH EVERY NIGHT AT BEDTIME    Insomnia, unspecified type

## 2018-08-09 NOTE — PROGRESS NOTES
Chief Complaint:    Chief Complaint   Patient presents with     Toenail     Right greater toe infection.  Treated with Ned for 2 months and nail came right off. New nail started growing and new toe nail is white and yellow. Toe starting to get red past first joint.        HPI: Cas Noriega is an 73 year old male who presents for evaluation and treatment of R great toe redness.  Patient states that he has been putting vicks on his toenail to treat fungal infection for some time now.  He is concerned that his toe is red and may be infected.  He has not had any toe pain.  He does have some occasional tingling and states that this is normal for him.  He is walking on the R foot with no discomfort.        ROS:      Review of Systems   Constitutional: Negative.  Negative for chills, diaphoresis and fever.   HENT: Negative.  Negative for congestion, ear pain, rhinorrhea and sore throat.    Eyes: Negative.  Negative for discharge, redness and itching.   Respiratory: Negative.  Negative for cough, chest tightness, shortness of breath and wheezing.    Cardiovascular: Negative.  Negative for chest pain and palpitations.   Gastrointestinal: Negative.  Negative for abdominal pain, diarrhea, nausea and vomiting.   Endocrine: Negative.  Negative for polydipsia and polyuria.   Genitourinary: Negative for dysuria, frequency, hematuria and urgency.   Musculoskeletal: Negative.  Negative for myalgias.   Skin: Positive for color change. Negative for rash.   Allergic/Immunologic: Negative for immunocompromised state.   Neurological: Negative.  Negative for dizziness, weakness, light-headedness and headaches.   Hematological: Negative for adenopathy.        Family History   Family History   Problem Relation Age of Onset     Hypertension Mother      Cancer Father      bone cancer       Social History  Social History     Social History     Marital status:      Spouse name: N/A     Number of children: N/A     Years of  education: N/A     Occupational History     Not on file.     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 . Has two sons. He enjoys woodworking, fishing and his grandchildren.        Surgical History:  Past Surgical History:   Procedure Laterality Date     ARTHROPLASTY SHOULDER  2/26/2013    Procedure: ARTHROPLASTY SHOULDER;  Left Total Shoulder Arthropasty;  Surgeon: Xu Snell MD;  Location: [facility]     CHOLECYSTECTOMY, LAPOROSCOPIC  3/2007    Cholecystectomy, Laparoscopic     COLONOSCOPY  8/2009    Benign polyp     COLONOSCOPY N/A 3/25/2015    Procedure: COLONOSCOPY;  Surgeon: Deejay Marie MD;  Location: [facility]     IMPLANT COCHLEA WITH NERVE INTEGRITY MONITOR Right 12/17/2015    Procedure: IMPLANT COCHLEA WITH NERVE INTEGRITY MONITOR;  Surgeon: Mickey Turpin MD;  Location:  OR     IMPLANT COCHLEA WITH NERVE INTEGRITY MONITOR Left 3/27/2017    Procedure: IMPLANT COCHLEA WITH NERVE INTEGRITY MONITOR;  Surgeon: Mickey Turpin MD;  Location:  OR     PHACOEMULSIFICATION WITH STANDARD INTRAOCULAR LENS IMPLANT  6/4/2012    Procedure:PHACOEMULSIFICATION WITH STANDARD INTRAOCULAR LENS IMPLANT; Left [facility] phacoemulsification with intraocular lens implant; Surgeon:TEZ HILL; Location:[facility]     PHACOEMULSIFICATION WITH STANDARD INTRAOCULAR LENS IMPLANT  7/26/2012    Procedure: PHACOEMULSIFICATION WITH STANDARD INTRAOCULAR LENS IMPLANT;  Right [place]  phacoemulsification with intraocular lens implant;  Surgeon: Dayday Daley MD;  Location: [facility]     RELEASE CARPAL TUNNEL  10/23/2012    Procedure: RELEASE CARPAL TUNNEL;  Right carpal tunnel release;  Surgeon: Xu Snell MD;  Location: [facility]     RELEASE CARPAL TUNNEL  11/9/2012    Procedure: RELEASE CARPAL TUNNEL;  Left carpal tunnel release;  Surgeon: Xu Snell MD;  Location: [facility]     REMOVE HARDWARE ARTHROPLASTY SHOULDER  5/2/15    attempted and ended up doing bacteria removal     SURGICAL HISTORY OF -   3/1985    Bilateral Inguinal Herniorrhaphy     SURGICAL HISTORY OF -   5/1997    Breast Lumpectomy-Left-Benign     SURGICAL HISTORY OF -   6/10/2005    Rotator Cuff Repair     SURGICAL HISTORY OF -   10/2005    L5-S1 decompression and fusion with intrumentation     SURGICAL HISTORY OF -   2005    Right Shoulder Arthroplasty     SURGICAL HISTORY OF -   2006    PTCA with stent RCA     SURGICAL HISTORY OF -       Coronary Artery Bypass Graft        Problem List:  Patient Active Problem List   Diagnosis     Hypertension goal BP (blood pressure) < 140/90     Acute myocardial infarction of other inferior wall, episode of care unspecified     Hyperlipidemia with target LDL less than 70     Restless legs syndrome (RLS)     Insomnia     Generalized anxiety disorder     Mild major depression (H)     Lumbago     Rotator cuff disorder     Intermittent asthma     Advance Care Planning     Senile nuclear sclerosis     Sensorineural hearing loss, bilateral     S/P shoulder joint replacement     CAD (coronary artery disease)     SEVERE MARTY (obstructive sleep apnea)     Infection of prosthetic shoulder joint (H)     Cochlear implant in place     Encounter for long-term (current) use of antibiotics        Allergies:  Allergies   Allergen Reactions     Nka [No Known Allergies]         Current Meds:    Current Outpatient Prescriptions:      ACETAMINOPHEN PO, Take 500 mg by mouth 2 every 6 hours, morning,  noon and night. As Needed for shoulder., Disp: , Rfl:      albuterol (PROAIR HFA, PROVENTIL HFA, VENTOLIN HFA) 108 (90 BASE) MCG/ACT inhaler, Inhale 2 puffs into the lungs every 6 hours as needed for shortness of breath / dyspnea or wheezing, Disp: 1 Inhaler, Rfl: 11     aspirin 81 MG EC tablet, Take 2 tablets (162 mg) by mouth daily, Disp: 60 tablet, Rfl: 11     atenolol (TENORMIN) 50 MG tablet, Take 1 tablet (50 mg) by mouth daily, Disp: 90 tablet, Rfl: 3     atorvastatin (LIPITOR) 80 MG tablet, Take 1 tablet (80 mg) by mouth daily, Disp: 90 tablet, Rfl: 3     carbidopa-levodopa (SINEMET)  MG per tablet, TAKE 2 TABLETS BY MOUTH DAILY AT BEDTIME., Disp: 180 tablet, Rfl: 1     cefadroxil (DURICEF) 500 MG capsule, TAKE ONE CAPSULE BY MOUTH TWICE A DAY, Disp: 180 capsule, Rfl: 2     fluorouracil (EFUDEX) 5 % cream, Apply topically 2 times daily, Disp: 40 g, Rfl: 3     fluticasone (FLONASE) 50 MCG/ACT spray, USE ONE TO TWO SPRAYS INTO BOTH NOSTRILS ONCE DAILY, Disp: 48 g, Rfl: 0     hydrochlorothiazide (HYDRODIURIL) 25 MG tablet, Take 1 tablet (25 mg) by mouth every morning, Disp: 90 tablet, Rfl: 3     lactobacillus rhamnosus, GG, (CULTURELL) capsule, Take 1 capsule by mouth daily, Disp: 90 capsule, Rfl: 11     lisinopril (PRINIVIL/ZESTRIL) 40 MG tablet, Take 1 tablet (40 mg) by mouth daily, Disp: 90 tablet, Rfl: 3     nitroGLYcerin (NITROSTAT) 0.4 MG sublingual tablet, Place 1 tablet (0.4 mg) under the tongue every 5 minutes as needed for chest pain May repeat x 3. If no relief, call 911., Disp: 25 tablet, Rfl: 3     order for DME, Equipment being ordered: CPAP Cas Noriega received a [employer] AirSense 10 CPAP. Pressures were set at Auto 11 - 15 cm H2O., Disp: , Rfl:      rOPINIRole (REQUIP) 0.5 MG tablet, Take 1-2 tablets (0.5-1 mg) by mouth At Bedtime, Disp: 60 tablet, Rfl: 0     traZODone (DESYREL) 100 MG tablet, TAKE ONE TABLET BY MOUTH EVERY NIGHT AT BEDTIME, Disp: 90 tablet, Rfl: 1     ORDER FOR DME,  one digital automatic Home blood pressure machine ( per insurance) (Patient not taking: Reported on 12/4/2017), Disp: 1 Device, Rfl: 0     PHYSICAL EXAM:     Vital signs noted and reviewed by Aubrey Lui  /70 (BP Location: Right arm, Cuff Size: Adult Large)  Pulse 72  Temp 98.3  F (36.8  C) (Tympanic)  Resp 18  Wt 238 lb (108 kg)  BMI 36.07 kg/m2     PEFR:    Physical Exam   Constitutional: He appears well-developed and well-nourished. No distress.   HENT:   Head: Normocephalic and atraumatic.   Right Ear: Tympanic membrane and external ear normal.   Left Ear: Tympanic membrane and external ear normal.   Mouth/Throat: Oropharynx is clear and moist.   Eyes: EOM are normal. Pupils are equal, round, and reactive to light.   Neck: Normal range of motion. Neck supple.   Cardiovascular: Normal rate, regular rhythm, normal heart sounds and intact distal pulses.  Exam reveals no gallop and no friction rub.    No murmur heard.  Pulmonary/Chest: Effort normal and breath sounds normal. No respiratory distress.   Abdominal: Soft. Bowel sounds are normal. He exhibits no distension. There is no tenderness.   Musculoskeletal: Normal range of motion. He exhibits no edema, tenderness or deformity.        Right foot: There is normal range of motion, no tenderness, normal capillary refill and no deformity.   No erythema or swelling of the R great toe.  Full range of motion.  Some decreased sensation in the toe.   Lymphadenopathy:     He has no cervical adenopathy.   Neurological: He is alert. No cranial nerve deficit.   Skin: Skin is warm and dry. No rash noted. He is not diaphoretic.   Psychiatric: He has a normal mood and affect.   Nursing note and vitals reviewed.       ASSESSMENT:     1. Discoloration of skin of toe         PLAN:     R great toe examination was unremarkable today.  No sign of infection.  No erythema or swelling.  He does have a decrease in sensation and this appears to be more baseline for him with  his back Hx. Patient will continue to monitor this and follow up with his PCP if symptoms change or worsen.  Worrisome symptoms discussed with instructions to go o the ED.  Patient verbalized understanding and agreed with this plan.     Aubrey Lui  8/9/2018, 3:50 PM

## 2018-08-09 NOTE — NURSING NOTE
"Chief Complaint   Patient presents with     Toenail     Right greater toe infection.  Treated with Ned for 2 months and nail came right off. New nail started growing and new toe nail is white and yellow. Toe starting to get red past first joint.        Initial /70 (BP Location: Right arm, Cuff Size: Adult Large)  Pulse 72  Temp 98.3  F (36.8  C) (Tympanic)  Resp 18  Wt 238 lb (108 kg)  BMI 36.07 kg/m2 Estimated body mass index is 36.07 kg/(m^2) as calculated from the following:    Height as of 11/20/17: 5' 8.11\" (1.73 m).    Weight as of this encounter: 238 lb (108 kg).      Health Maintenance that is potentially due pending provider review:  NONE    n/a    Is there anyone who you would like to be able to receive your results? Not Applicable  If yes have patient fill out JACE Atkinson M.A.        "

## 2018-09-14 DIAGNOSIS — I10 ESSENTIAL HYPERTENSION WITH GOAL BLOOD PRESSURE LESS THAN 140/90: ICD-10-CM

## 2018-09-14 DIAGNOSIS — E78.5 HYPERLIPIDEMIA WITH TARGET LDL LESS THAN 70: ICD-10-CM

## 2018-09-14 DIAGNOSIS — I25.10 CORONARY ARTERY DISEASE INVOLVING NATIVE CORONARY ARTERY OF NATIVE HEART WITHOUT ANGINA PECTORIS: ICD-10-CM

## 2018-09-14 LAB
ANION GAP SERPL CALCULATED.3IONS-SCNC: 5 MMOL/L (ref 3–14)
BUN SERPL-MCNC: 12 MG/DL (ref 7–30)
CALCIUM SERPL-MCNC: 8.3 MG/DL (ref 8.5–10.1)
CHLORIDE SERPL-SCNC: 104 MMOL/L (ref 94–109)
CO2 SERPL-SCNC: 29 MMOL/L (ref 20–32)
CREAT SERPL-MCNC: 0.8 MG/DL (ref 0.66–1.25)
GFR SERPL CREATININE-BSD FRML MDRD: >90 ML/MIN/1.7M2
GLUCOSE SERPL-MCNC: 120 MG/DL (ref 70–99)
POTASSIUM SERPL-SCNC: 3.7 MMOL/L (ref 3.4–5.3)
SODIUM SERPL-SCNC: 138 MMOL/L (ref 133–144)

## 2018-09-14 PROCEDURE — 36415 COLL VENOUS BLD VENIPUNCTURE: CPT | Performed by: INTERNAL MEDICINE

## 2018-09-14 PROCEDURE — 80048 BASIC METABOLIC PNL TOTAL CA: CPT | Performed by: INTERNAL MEDICINE

## 2018-09-17 ENCOUNTER — OFFICE VISIT (OUTPATIENT)
Dept: CARDIOLOGY | Facility: CLINIC | Age: 73
End: 2018-09-17
Attending: INTERNAL MEDICINE
Payer: COMMERCIAL

## 2018-09-17 VITALS
SYSTOLIC BLOOD PRESSURE: 114 MMHG | HEART RATE: 45 BPM | BODY MASS INDEX: 35.77 KG/M2 | DIASTOLIC BLOOD PRESSURE: 60 MMHG | WEIGHT: 236 LBS | OXYGEN SATURATION: 96 %

## 2018-09-17 DIAGNOSIS — I25.10 CORONARY ARTERY DISEASE INVOLVING NATIVE CORONARY ARTERY OF NATIVE HEART WITHOUT ANGINA PECTORIS: Primary | ICD-10-CM

## 2018-09-17 DIAGNOSIS — I10 ESSENTIAL HYPERTENSION WITH GOAL BLOOD PRESSURE LESS THAN 140/90: ICD-10-CM

## 2018-09-17 DIAGNOSIS — E78.5 HYPERLIPIDEMIA WITH TARGET LDL LESS THAN 70: ICD-10-CM

## 2018-09-17 PROCEDURE — 99214 OFFICE O/P EST MOD 30 MIN: CPT | Performed by: PHYSICIAN ASSISTANT

## 2018-09-17 NOTE — PROGRESS NOTES
Please see separate dictation for HPI, PHYSICAL EXAM AND IMPRESSION/PLAN.    CURRENT MEDICATIONS:  Current Outpatient Prescriptions   Medication Sig Dispense Refill     ACETAMINOPHEN PO Take 500 mg by mouth 2 every 6 hours, morning, noon and night. As Needed for shoulder.       albuterol (PROAIR HFA, PROVENTIL HFA, VENTOLIN HFA) 108 (90 BASE) MCG/ACT inhaler Inhale 2 puffs into the lungs every 6 hours as needed for shortness of breath / dyspnea or wheezing 1 Inhaler 11     aspirin 81 MG EC tablet Take 2 tablets (162 mg) by mouth daily 60 tablet 11     atenolol (TENORMIN) 50 MG tablet Take 1 tablet (50 mg) by mouth daily 90 tablet 3     atorvastatin (LIPITOR) 80 MG tablet Take 1 tablet (80 mg) by mouth daily 90 tablet 3     carbidopa-levodopa (SINEMET)  MG per tablet TAKE 2 TABLETS BY MOUTH DAILY AT BEDTIME. 180 tablet 1     cefadroxil (DURICEF) 500 MG capsule TAKE ONE CAPSULE BY MOUTH TWICE A  capsule 2     fluorouracil (EFUDEX) 5 % cream Apply topically 2 times daily 40 g 3     fluticasone (FLONASE) 50 MCG/ACT spray USE ONE TO TWO SPRAYS INTO BOTH NOSTRILS ONCE DAILY 48 g 0     hydrochlorothiazide (HYDRODIURIL) 25 MG tablet Take 1 tablet (25 mg) by mouth every morning 90 tablet 3     lactobacillus rhamnosus, GG, (CULTURELL) capsule Take 1 capsule by mouth daily 90 capsule 11     lisinopril (PRINIVIL/ZESTRIL) 40 MG tablet Take 1 tablet (40 mg) by mouth daily 90 tablet 3     nitroGLYcerin (NITROSTAT) 0.4 MG sublingual tablet Place 1 tablet (0.4 mg) under the tongue every 5 minutes as needed for chest pain May repeat x 3. If no relief, call 911. 25 tablet 3     rOPINIRole (REQUIP) 0.5 MG tablet Take 1-2 tablets (0.5-1 mg) by mouth At Bedtime 60 tablet 0     traZODone (DESYREL) 100 MG tablet TAKE ONE TABLET BY MOUTH EVERY NIGHT AT BEDTIME 90 tablet 1     order for DME Equipment being ordered: CPAP Cas Noriega received a [employer] AirSense 10 CPAP. Pressures were set at Auto 11 - 15 cm H2O.       ORDER  FOR DME one digital automatic Home blood pressure machine ( per insurance) (Patient not taking: Reported on 12/4/2017) 1 Device 0       ALLERGIES:     Allergies   Allergen Reactions     Nka [No Known Allergies]        PAST MEDICAL HISTORY:  Past Medical History:   Diagnosis Date     Actinic keratosis      Acute myocardial infarction of other inferior wall, episode of care unspecified      CAD (coronary artery disease) 2/27/2013     Diverticulitis of small intestine (without mention of hemorrhage) 2003    recovered     Generalized anxiety disorder 4/28/2010     Hyperlipidemia LDL goal < 70      Hypertension goal BP (blood pressure) < 140/90      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  RLS      Restless legs syndrome (RLS)      Rotator cuff disorder 6/23/2010    H/o rotator cuff repair on right-6/05 L shoulder surgery pending-Dr. Hartley until insurance changes      S/P shoulder joint replacement 2/26/2013     Senile nuclear sclerosis 6/3/2012     Sensorineural hearing loss, bilateral 7/18/2012       PAST SURGICAL HISTORY:  Past Surgical History:   Procedure Laterality Date     ARTHROPLASTY SHOULDER  2/26/2013    Procedure: ARTHROPLASTY SHOULDER;  Left Total Shoulder Arthropasty;  Surgeon: Xu Snell MD;  Location: [facility]     CHOLECYSTECTOMY, LAPOROSCOPIC  3/2007    Cholecystectomy, Laparoscopic     COLONOSCOPY  8/2009    Benign polyp     COLONOSCOPY N/A 3/25/2015    Procedure: COLONOSCOPY;  Surgeon: Deejay Marie MD;  Location: [facility]     IMPLANT COCHLEA WITH NERVE INTEGRITY MONITOR  Right 12/17/2015    Procedure: IMPLANT COCHLEA WITH NERVE INTEGRITY MONITOR;  Surgeon: Mickey Turpin MD;  Location: [facility]     IMPLANT COCHLEA WITH NERVE INTEGRITY MONITOR Left 3/27/2017    Procedure: IMPLANT COCHLEA WITH NERVE INTEGRITY MONITOR;  Surgeon: Mickey Turpin MD;  Location: [facility]     PHACOEMULSIFICATION WITH STANDARD INTRAOCULAR LENS IMPLANT  6/4/2012    Procedure:PHACOEMULSIFICATION WITH STANDARD INTRAOCULAR LENS IMPLANT; Left [place] phacoemulsification with intraocular lens implant; Surgeon:DAYDAY HILL; Location:[facility]     PHACOEMULSIFICATION WITH STANDARD INTRAOCULAR LENS IMPLANT  7/26/2012    Procedure: PHACOEMULSIFICATION WITH STANDARD INTRAOCULAR LENS IMPLANT;  Right [place] phacoemulsification with intraocular lens implant;  Surgeon: Dayday Hill MD;  Location: [facility]     RELEASE CARPAL TUNNEL  10/23/2012    Procedure: RELEASE CARPAL TUNNEL;  Right carpal tunnel release;  Surgeon: Xu Snell MD;  Location: [facility]     RELEASE CARPAL TUNNEL  11/9/2012    Procedure: RELEASE CARPAL TUNNEL;  Left carpal tunnel release;  Surgeon: Xu Snell MD;  Location: [facility]     REMOVE HARDWARE ARTHROPLASTY SHOULDER  5/2/15    attempted and ended up doing bacteria removal     SURGICAL HISTORY OF -   3/1985    Bilateral Inguinal Herniorrhaphy     SURGICAL HISTORY OF -   5/1997    Breast Lumpectomy-Left-Benign     SURGICAL HISTORY OF -   6/10/2005    Rotator Cuff Repair     SURGICAL HISTORY OF -   10/2005    L5-S1 decompression and fusion with intrumentation     SURGICAL HISTORY OF -   2005    Right Shoulder Arthroplasty     SURGICAL HISTORY OF -   2006    PTCA with stent RCA     SURGICAL HISTORY OF -       Coronary Artery Bypass Graft       SOCIAL HISTORY:  Social History     Social History     Marital status:      Spouse name: N/A     Number of children: N/A     Years of education: N/A     Social History Main Topics     Smoking status: Former Smoker     Packs/day: 1.00      Years: 40.00     Types: Cigarettes     Start date: 8/8/1956     Quit date: 1/1/1998     Smokeless tobacco: Never Used     Alcohol use 0.0 oz/week      Comment: 2 glasses a wine/2 cans a beer a month     Drug use: No     Sexual activity: No     Other Topics Concern     None     Social History Narrative    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 . Has two sons. He enjoys woodworking, fishing and his grandchildren.       FAMILY HISTORY:  Family History   Problem Relation Age of Onset     Hypertension Mother      Cancer Father      bone cancer       Review of Systems:  Skin:  Positive for bruising     Eyes:  Negative      ENT:  Positive for hearing loss;tinnitus    Respiratory:  Positive for shortness of breath;dyspnea on exertion     Cardiovascular:    Positive for;lightheadedness    Gastroenterology: Negative      Genitourinary:  Positive for urinary frequency    Musculoskeletal:  Negative      Neurologic:  Positive for numbness or tingling of feet    Psychiatric:  Negative      Heme/Lymph/Imm:  Negative      Endocrine:  Negative         Reviewed. Remainder of the note dictated.    Mayuri Lawson PA-C

## 2018-09-17 NOTE — MR AVS SNAPSHOT
"              After Visit Summary   9/17/2018    Cas Noriega    MRN: [id number]           Patient Information     Date Of Birth          1945        Visit Information        Provider Department      9/17/2018 2:30 PM Mayuri Lawson PA-C [facility]        Today's Diagnoses     Coronary artery disease involving native coronary artery of native heart without angina pectoris    -  1    Essential hypertension with goal blood pressure less than 140/90        Hyperlipidemia with target LDL less than 70          Care Instructions    [facility]~[address]~[place], [place]~[address]  Thank you for your  Heart Care visit today. If you have questions regarding your visit, please contact your cardiology RN's, Meryl Shay or Zee Cosby, at [phone]. Your provider has recommended the following:    Medication Changes:  No changes at this time  Recommendations:  Nothing new  Follow-up:  See Dr Dumont for cardiology follow up in April/May 2019 with lab work prior.    To schedule a future appointment, we kindly ask that you call cardiology scheduling at [phone] three months prior to requested revisit date.  [facility] cardiology clinic is staffed with \"Advance Practice Providers\". These are our cardiology Physician Assistants and Nurse Practitioners. Please call cardiology scheduling if you feel you need clinical evaluation with them at any time for any cardiac reason.                 Reminder:  For your safety, we ask that you bring in your current medication(s) or an updated list of your medications with you to EACH office visit. Include the medication name, dose of pill on bottle and how you are taking it. Include over-the-counter medications or supplements. Your provider will review this at each visit and plan your care based on your current information.               Follow-ups after your " visit        Your next 10 appointments already scheduled     Sep 18, 2018  1:00 PM CDT   Cochlear Implant Return with Macrina Barriga [facility] ([facility] [place])    [address]  [address] [facility] [address] [phone]              Who to contact     If you have questions or need follow up information about today's clinic visit or your schedule please contact [facility] directly at [phone].  Normal or non-critical lab and imaging results will be communicated to you by MyChart, letter or phone within 4 business days after the clinic has received the results. If you do not hear from us within 7 days, please contact the clinic through MyChart or phone. If you have a critical or abnormal lab result, we will notify you by phone as soon as possible.  Submit refill requests through [employer] or call your pharmacy and they will forward the refill request to us. Please allow 3 business days for your refill to be completed.          Additional Information About Your Visit        Care EveryWhere ID     This is your Care EveryWhere ID. This could be used by other organizations to access your [facility] medical records  [id number]        Your Vitals Were     Pulse Pulse Oximetry BMI (Body Mass Index)             45 96% 35.77 kg/m2          Blood Pressure from Last 3 Encounters:   09/17/18 114/60   08/09/18 130/70   07/20/18 125/70    Weight from Last 3 Encounters:   09/17/18 107 kg (236 lb)   08/09/18 108 kg (238 lb)   07/20/18 107.4 kg (236 lb 12.8 oz)              We Performed the Following     Follow-Up with Cardiac Advanced Practice Provider        Primary Care Provider Office Phone # Fax #    Sera Garcia PA-C [phone] [address] [place] [id number]        Equal Access to Services     MARIA A ARANA : alicia Mcintyre, rio olivares  josesanchez monsonnilesh bettyanthony lapratibhasanchez ah. [facility] [phone].    ATENCIÓN: Si habla william, tiene a holbrook disposición servicios gratuitos de asistencia lingüística. Idris calderon [phone].    We comply with applicable federal civil rights laws and [place] laws. We do not discriminate on the basis of race, color, national origin, age, disability, sex, sexual orientation, or gender identity.            Thank you!     Thank you for choosing [facility]  for your care. Our goal is always to provide you with excellent care. Hearing back from our patients is one way we can continue to improve our services. Please take a few minutes to complete the written survey that you may receive in the mail after your visit with us. Thank you!             Your Updated Medication List - Protect others around you: Learn how to safely use, store and throw away your medicines at www.disposemymeds.org.          This list is accurate as of 9/17/18  2:53 PM.  Always use your most recent med list.                   Brand Name Dispense Instructions for use Diagnosis    ACETAMINOPHEN PO      Take 500 mg by mouth 2 every 6 hours, morning, noon and night. As Needed for shoulder.        albuterol 108 (90 Base) MCG/ACT inhaler    PROAIR HFA/PROVENTIL HFA/VENTOLIN HFA    1 Inhaler    Inhale 2 puffs into the lungs every 6 hours as needed for shortness of breath / dyspnea or wheezing    Intermittent asthma, uncomplicated       aspirin 81 MG EC tablet     60 tablet    Take 2 tablets (162 mg) by mouth daily    Coronary artery disease involving native coronary artery of native heart without angina pectoris       atenolol 50 MG tablet    TENORMIN    90 tablet    Take 1 tablet (50 mg) by mouth daily    Essential hypertension with goal blood pressure less than 140/90       atorvastatin 80 MG tablet    LIPITOR    90 tablet    Take 1 tablet (80 mg) by mouth daily    Hyperlipidemia with target LDL less than 70        carbidopa-levodopa  MG per tablet    SINEMET    180 tablet    TAKE 2 TABLETS BY MOUTH DAILY AT BEDTIME.    Restless legs syndrome (RLS)       cefadroxil 500 MG capsule    DURICEF    180 capsule    TAKE ONE CAPSULE BY MOUTH TWICE A DAY    Prosthetic joint infection, sequela       fluorouracil 5 % cream    EFUDEX    40 g    Apply topically 2 times daily    Actinic keratosis       fluticasone 50 MCG/ACT spray    FLONASE    48 g    USE ONE TO TWO SPRAYS INTO BOTH NOSTRILS ONCE DAILY    Chronic nasal congestion       hydrochlorothiazide 25 MG tablet    HYDRODIURIL    90 tablet    Take 1 tablet (25 mg) by mouth every morning    Essential hypertension with goal blood pressure less than 140/90       lactobacillus rhamnosus (GG) capsule     90 capsule    Take 1 capsule by mouth daily    Encounter for long-term (current) use of antibiotics       lisinopril 40 MG tablet    PRINIVIL/ZESTRIL    90 tablet    Take 1 tablet (40 mg) by mouth daily    Essential hypertension with goal blood pressure less than 140/90       nitroGLYcerin 0.4 MG sublingual tablet    NITROSTAT    25 tablet    Place 1 tablet (0.4 mg) under the tongue every 5 minutes as needed for chest pain May repeat x 3. If no relief, call 911.    Coronary artery disease involving native heart with angina pectoris, unspecified vessel or lesion type (H)       order for DME      Equipment being ordered: CPAP Cardozo T Hari received a [employer] AirSense 10 CPAP. Pressures were set at Auto 11 - 15 cm H2O.        order for DME     1 Device    one digital automatic Home blood pressure machine ( per insurance)    Unspecified essential hypertension       rOPINIRole 0.5 MG tablet    REQUIP    60 tablet    Take 1-2 tablets (0.5-1 mg) by mouth At Bedtime    Restless leg syndrome       traZODone 100 MG tablet    DESYREL    90 tablet    TAKE ONE TABLET BY MOUTH EVERY NIGHT AT BEDTIME    Insomnia, unspecified type

## 2018-09-17 NOTE — LETTER
9/17/2018    Sera Garcia PA-C  [address] [place] [id number]    RE: Cas Noriega       Dear Colleague,    I had the pleasure of seeing Cas Noriega in the [facility].    Please see separate dictation for HPI, PHYSICAL EXAM AND IMPRESSION/PLAN.    CURRENT MEDICATIONS:  Current Outpatient Prescriptions   Medication Sig Dispense Refill     ACETAMINOPHEN PO Take 500 mg by mouth 2 every 6 hours, morning, noon and night. As Needed for shoulder.       albuterol (PROAIR HFA, PROVENTIL HFA, VENTOLIN HFA) 108 (90 BASE) MCG/ACT inhaler Inhale 2 puffs into the lungs every 6 hours as needed for shortness of breath / dyspnea or wheezing 1 Inhaler 11     aspirin 81 MG EC tablet Take 2 tablets (162 mg) by mouth daily 60 tablet 11     atenolol (TENORMIN) 50 MG tablet Take 1 tablet (50 mg) by mouth daily 90 tablet 3     atorvastatin (LIPITOR) 80 MG tablet Take 1 tablet (80 mg) by mouth daily 90 tablet 3     carbidopa-levodopa (SINEMET)  MG per tablet TAKE 2 TABLETS BY MOUTH DAILY AT BEDTIME. 180 tablet 1     cefadroxil (DURICEF) 500 MG capsule TAKE ONE CAPSULE BY MOUTH TWICE A  capsule 2     fluorouracil (EFUDEX) 5 % cream Apply topically 2 times daily 40 g 3     fluticasone (FLONASE) 50 MCG/ACT spray USE ONE TO TWO SPRAYS INTO BOTH NOSTRILS ONCE DAILY 48 g 0     hydrochlorothiazide (HYDRODIURIL) 25 MG tablet Take 1 tablet (25 mg) by mouth every morning 90 tablet 3     lactobacillus rhamnosus, GG, (CULTURELL) capsule Take 1 capsule by mouth daily 90 capsule 11     lisinopril (PRINIVIL/ZESTRIL) 40 MG tablet Take 1 tablet (40 mg) by mouth daily 90 tablet 3     nitroGLYcerin (NITROSTAT) 0.4 MG sublingual tablet Place 1 tablet (0.4 mg) under the tongue every 5 minutes as needed for chest pain May repeat x 3. If no relief, call 911. 25 tablet 3     rOPINIRole (REQUIP) 0.5 MG tablet Take 1-2 tablets (0.5-1 mg) by mouth At Bedtime 60 tablet 0     traZODone (DESYREL)  100 MG tablet TAKE ONE TABLET BY MOUTH EVERY NIGHT AT BEDTIME 90 tablet 1     order for DME Equipment being ordered: CPAP Cas Noriega received a [employer] AirSense 10 CPAP. Pressures were set at Auto 11 - 15 cm H2O.       ORDER FOR DME one digital automatic Home blood pressure machine ( per insurance) (Patient not taking: Reported on 12/4/2017) 1 Device 0       ALLERGIES:     Allergies   Allergen Reactions     Nka [No Known Allergies]        PAST MEDICAL HISTORY:  Past Medical History:   Diagnosis Date     Actinic keratosis      Acute myocardial infarction of other inferior wall, episode of care unspecified      CAD (coronary artery disease) 2/27/2013     Diverticulitis of small intestine (without mention of hemorrhage) 2003    recovered     Generalized anxiety disorder 4/28/2010     Hyperlipidemia LDL goal < 70      Hypertension goal BP (blood pressure) < 140/90      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  RLS      Restless legs syndrome (RLS)      Rotator cuff disorder 6/23/2010    H/o rotator cuff repair on right-6/05 L shoulder surgery pending-Dr. Hartley until insurance changes      S/P shoulder joint replacement 2/26/2013     Senile nuclear sclerosis 6/3/2012     Sensorineural hearing loss, bilateral 7/18/2012       PAST SURGICAL HISTORY:  Past Surgical History:   Procedure Laterality Date     ARTHROPLASTY SHOULDER  2/26/2013    Procedure: ARTHROPLASTY SHOULDER;  Left Total Shoulder Arthropasty;  Surgeon: Xu Snell MD;  Location: [facility]     CHOLECYSTECTOMY, LAPOROSCOPIC  3/2007     Cholecystectomy, Laparoscopic     COLONOSCOPY  8/2009    Benign polyp     COLONOSCOPY N/A 3/25/2015    Procedure: COLONOSCOPY;  Surgeon: Deejay Marie MD;  Location: [facility]     IMPLANT COCHLEA WITH NERVE INTEGRITY MONITOR Right 12/17/2015    Procedure: IMPLANT COCHLEA WITH NERVE INTEGRITY MONITOR;  Surgeon: Mickey Turpin MD;  Location: [facility]     IMPLANT COCHLEA WITH NERVE INTEGRITY MONITOR Left 3/27/2017    Procedure: IMPLANT COCHLEA WITH NERVE INTEGRITY MONITOR;  Surgeon: Mickey Turpin MD;  Location: [facility]     PHACOEMULSIFICATION WITH STANDARD INTRAOCULAR LENS IMPLANT  6/4/2012    Procedure:PHACOEMULSIFICATION WITH STANDARD INTRAOCULAR LENS IMPLANT; Left kelman phacoemulsification with intraocular lens implant; Surgeon:DAYDAY HILL; Location:[facility]     PHACOEMULSIFICATION WITH STANDARD INTRAOCULAR LENS IMPLANT  7/26/2012    Procedure: PHACOEMULSIFICATION WITH STANDARD INTRAOCULAR LENS IMPLANT;  Right Kelman phacoemulsification with intraocular lens implant;  Surgeon: Dayday Hill MD;  Location: [facility]     RELEASE CARPAL TUNNEL  10/23/2012    Procedure: RELEASE CARPAL TUNNEL;  Right carpal tunnel release;  Surgeon: Xu Snell MD;  Location: [facility]     RELEASE CARPAL TUNNEL  11/9/2012    Procedure: RELEASE CARPAL TUNNEL;  Left carpal tunnel release;  Surgeon: Xu Snell MD;  Location: [facility]     REMOVE HARDWARE ARTHROPLASTY SHOULDER  5/2/15    attempted and ended up doing bacteria removal     SURGICAL HISTORY OF -   3/1985    Bilateral Inguinal Herniorrhaphy     SURGICAL HISTORY OF -   5/1997    Breast Lumpectomy-Left-Benign     SURGICAL HISTORY OF -   6/10/2005    Rotator Cuff Repair     SURGICAL HISTORY OF -   10/2005    L5-S1 decompression and fusion with intrumentation     SURGICAL HISTORY OF -   2005    Right Shoulder Arthroplasty     SURGICAL HISTORY OF -   2006    PTCA with stent RCA     SURGICAL HISTORY OF -       Coronary Artery Bypass Graft       SOCIAL  HISTORY:  Social History     Social History     Marital status:      Spouse name: N/A     Number of children: N/A     Years of education: N/A     Social History Main Topics     Smoking status: Former Smoker     Packs/day: 1.00     Years: 40.00     Types: Cigarettes     Start date: 8/8/1956     Quit date: 1/1/1998     Smokeless tobacco: Never Used     Alcohol use 0.0 oz/week      Comment: 2 glasses a wine/2 cans a beer a month     Drug use: No     Sexual activity: No     Other Topics Concern     None     Social History Narrative    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 . Has two sons. He enjoys woodworking, fishing and his grandchildren.       FAMILY HISTORY:  Family History   Problem Relation Age of Onset     Hypertension Mother      Cancer Father      bone cancer       Review of Systems:  Skin:  Positive for bruising     Eyes:  Negative      ENT:  Positive for hearing loss;tinnitus    Respiratory:  Positive for shortness of breath;dyspnea on exertion     Cardiovascular:    Positive for;lightheadedness    Gastroenterology: Negative      Genitourinary:  Positive for urinary frequency    Musculoskeletal:  Negative      Neurologic:  Positive for numbness or tingling of feet    Psychiatric:  Negative      Heme/Lymph/Imm:  Negative      Endocrine:  Negative         Reviewed. Remainder of the note dictated.    Mayuri Lawson PA-C        Service Date: 09/17/2018      HISTORY OF PRESENT ILLNESS:  Mr. Noriega is a pleasant 73-year-old gentleman who presents to Cardiology office today for routine 2-month followup.      Today is the first time I am meeting Mr. Noriega.  In reviewing the records, it appears that his history includes coronary artery disease with history of an inferior wall myocardial infarction in about 2006 which was treated at an outside  [facility] with stent placement to the RCA at that time, followed by coronary artery bypass surgery.  The patient does not remember any details of the event, and I do not see any cardiac records scanned into our system.  He also has hypertension, hyperlipidemia, sleep apnea and a history of mild to moderate carotid artery disease.  He has followed very closely with Dr. Hua for a number of years and overall has done well.      At this time, he continues to do well.  He denies any chest discomfort, shortness of breath, orthopnea, PND or lower extremity edema.  He states that he has noticed some sweating with exertion, but this is not particularly unusual for him.  Over the last month in the hot and humid weather, he may have been sweating a bit more, but he does not feel that this is an overall major change.  He does not remember having any anginal-type symptoms prior to his MI ~10 years ago.  It sounds like his presentation was either syncope or cardiac arrest.      CURRENT CARDIAC MEDICATIONS:   1.  Aspirin 81 mg 2 tablets daily.   2.  Atenolol 50 mg daily.   3.  Atorvastatin 80 mg daily.   4.  Hydrochlorothiazide 25 mg daily.   5.  Lisinopril 40 mg daily.   6.  Sublingual nitro p.r.n.      The remainder of his medications, allergies and review of systems were reviewed and as are documented separately.      PHYSICAL EXAMINATION:   GENERAL:  The patient is a pleasant 73-year-old gentleman who appears his stated age.  He is in no apparent distress.   VITAL SIGNS:  His blood pressure is 114/60, pulse is 45.  His weight is 236 pounds, which is stable.   LUNGS:  Breathing is nonlabored.  Lungs are clear to auscultation.   CARDIAC:  Reveals a regular rate and rhythm.  He did have an occasional ectopic beat, no murmur is appreciated.   EXTREMITIES:  Lower extremities show no evidence of edema.   NEUROLOGIC:  Alert and oriented.      He had a basic metabolic panel on the 14th of this month.  His sodium is 138, potassium  3.7, BUN is 12, creatinine 0.8.      ASSESSMENT AND PLAN:  Mr. Noriega is a pleasant 73-year-old gentleman with a history of coronary artery disease with prior coronary artery bypass surgery (and possible stenting prior to this), details of this are unclear, history of mild to moderate carotid artery disease, hypertension and hyperlipidemia who presents for routine followup.  Overall, he is doing well and is asymptomatic from a cardiac standpoint.  His blood pressure and lipids are well controlled.  I did not make any medication changes today.  He will follow up in the Cardiology office as previously directed by Dr. Hua in the spring of 2019 to establish care with a new cardiologist with repeat blood work prior to that office visit.  Of course, I encouraged him to contact us sooner with questions or concerns.         MAYURI MORE PA-C             D: 2018   T: 2018   MT: BILL      Name:     CHARLES NORIEGA   MRN:      [id number]        Account:      [id number]   :      1945           Service Date: 2018      Document: [id number]        Thank you for allowing me to participate in the care of your patient.      Sincerely,     Mayuri More PA-C     [facility]    cc:   Donny Hua MD  [address]  [place] [address]

## 2018-09-17 NOTE — PATIENT INSTRUCTIONS
"[facility] HEART CARE  [facility]~[address]~[place], [place]~[address]  Thank you for your [facility] Heart Care visit today. If you have questions regarding your visit, please contact your cardiology RN's, Meryl Shay or Zee Cosby, at [phone]. Your provider has recommended the following:    Medication Changes:  No changes at this time  Recommendations:  Nothing new  How to register for [employer] in 3 easy steps:  1. Get a [employer] Access Code from a member of your care team. If you received an After Visit Summary, you will find your access code printed on the bottom.  2. Visit [employer] and click on the  [employer] Log-in  button. Then click on  Sign Up Now   3. Enter your Access Code, pick a [employer] ID and password and log in to begin using [employer]  Mobile Otf:  The [employer] mobile application (otf) is available for iPhone and Android users. The  secure, FREE otf provides you easy access on the go from your mobile device. Search   [employer]  in your AppStore to begin using it today.  My personal [employer] Log-In Information:  [employer] Access Code: ____________________________________  [employer] ID: ______________________________________________________  (you create an ID when you register)  Password: __________________________________________________________  (you create a password when you register)  For assistance contact the [employer] Access Services line at [phone].    Follow-up:  See Dr Dumont for cardiology follow up in April/May 2019 with lab work prior.    To schedule a future appointment, we kindly ask that you call cardiology scheduling at [phone] three months prior to requested revisit date.  [facility] cardiology clinic is staffed with \"Advance Practice Providers\". These are our cardiology Physician Assistants and Nurse Practitioners. Please call cardiology scheduling if you feel you need clinical evaluation with them at any time for any cardiac " reason.                 Reminder:  For your safety, we ask that you bring in your current medication(s) or an updated list of your medications with you to EACH office visit. Include the medication name, dose of pill on bottle and how you are taking it. Include over-the-counter medications or supplements. Your provider will review this at each visit and plan your care based on your current information.

## 2018-09-17 NOTE — LETTER
9/17/2018      Sera Garcia PA-C  [address] [place] [id number]      RE: Cas Noriega       Dear Colleague,    I had the pleasure of seeing Cas Noriega in the [facility].    Service Date: 09/17/2018      HISTORY OF PRESENT ILLNESS:  Mr. Noriega is a pleasant 73-year-old gentleman who presents to Cardiology office today for routine 2-month followup.      Today is the first time I am meeting Mr. Noriega.  In reviewing the records, it appears that his history includes coronary artery disease with history of an inferior wall myocardial infarction in about 2006 which was treated at an outside hospital with stent placement to the RCA at that time, followed by coronary artery bypass surgery.  The patient does not remember any details of the event, and I do not see any cardiac records scanned into our system.  He also has hypertension, hyperlipidemia, sleep apnea and a history of mild to moderate carotid artery disease.  He has followed very closely with Dr. Hua for a number of years and overall has done well.      At this time, he continues to do well.  He denies any chest discomfort, shortness of breath, orthopnea, PND or lower extremity edema.  He states that he has noticed some sweating with exertion, but this is not particularly unusual for him.  Over the last month in the hot and humid weather, he may have been sweating a bit more, but he does not feel that this is an overall major change.  He does not remember having any anginal-type symptoms prior to his MI ~10 years ago.  It sounds like his presentation was either syncope or cardiac arrest.      CURRENT CARDIAC MEDICATIONS:   1.  Aspirin 81 mg 2 tablets daily.   2.  Atenolol 50 mg daily.   3.  Atorvastatin 80 mg daily.   4.  Hydrochlorothiazide 25 mg daily.   5.  Lisinopril 40 mg daily.   6.  Sublingual nitro p.r.n.      The remainder of his medications, allergies and review of systems were reviewed  and as are documented separately.      PHYSICAL EXAMINATION:   GENERAL:  The patient is a pleasant 73-year-old gentleman who appears his stated age.  He is in no apparent distress.   VITAL SIGNS:  His blood pressure is 114/60, pulse is 45.  His weight is 236 pounds, which is stable.   LUNGS:  Breathing is nonlabored.  Lungs are clear to auscultation.   CARDIAC:  Reveals a regular rate and rhythm.  He did have an occasional ectopic beat, no murmur is appreciated.   EXTREMITIES:  Lower extremities show no evidence of edema.   NEUROLOGIC:  Alert and oriented.      He had a basic metabolic panel on the 14 of this month.  His sodium is 138, potassium 3.7, BUN is 12, creatinine 0.8.      ASSESSMENT AND PLAN:  Mr. Noriega is a pleasant 73-year-old gentleman with a history of coronary artery disease with prior coronary artery bypass surgery (and possible stenting prior to this), details of this are unclear, history of mild to moderate carotid artery disease, hypertension and hyperlipidemia who presents for routine followup.  Overall, he is doing well and is asymptomatic from a cardiac standpoint.  His blood pressure and lipids are well controlled.  I did not make any medication changes today.  He will follow up in the Cardiology office as previously directed by Dr. Hua in the spring of 2019 to establish care with a new cardiologist with repeat blood work prior to that office visit.  Of course, I encouraged him to contact us sooner with questions or concerns.         LIANE MORE PA-C             D: 2018   T: 2018   MT: BILL      Name:     CHARLES NORIEGA   MRN:      [id number]        Account:      [id number]   :      1945           Service Date: 2018      Document: [id number]           Outpatient Encounter Prescriptions as of 2018   Medication Sig Dispense Refill     ACETAMINOPHEN PO Take 500 mg by mouth 2 every 6 hours, morning, noon and night. As Needed for shoulder.        albuterol (PROAIR HFA, PROVENTIL HFA, VENTOLIN HFA) 108 (90 BASE) MCG/ACT inhaler Inhale 2 puffs into the lungs every 6 hours as needed for shortness of breath / dyspnea or wheezing 1 Inhaler 11     aspirin 81 MG EC tablet Take 2 tablets (162 mg) by mouth daily 60 tablet 11     atenolol (TENORMIN) 50 MG tablet Take 1 tablet (50 mg) by mouth daily 90 tablet 3     atorvastatin (LIPITOR) 80 MG tablet Take 1 tablet (80 mg) by mouth daily 90 tablet 3     carbidopa-levodopa (SINEMET)  MG per tablet TAKE 2 TABLETS BY MOUTH DAILY AT BEDTIME. 180 tablet 1     cefadroxil (DURICEF) 500 MG capsule TAKE ONE CAPSULE BY MOUTH TWICE A  capsule 2     fluorouracil (EFUDEX) 5 % cream Apply topically 2 times daily 40 g 3     fluticasone (FLONASE) 50 MCG/ACT spray USE ONE TO TWO SPRAYS INTO BOTH NOSTRILS ONCE DAILY 48 g 0     hydrochlorothiazide (HYDRODIURIL) 25 MG tablet Take 1 tablet (25 mg) by mouth every morning 90 tablet 3     lactobacillus rhamnosus, GG, (CULTURELL) capsule Take 1 capsule by mouth daily 90 capsule 11     lisinopril (PRINIVIL/ZESTRIL) 40 MG tablet Take 1 tablet (40 mg) by mouth daily 90 tablet 3     nitroGLYcerin (NITROSTAT) 0.4 MG sublingual tablet Place 1 tablet (0.4 mg) under the tongue every 5 minutes as needed for chest pain May repeat x 3. If no relief, call 911. 25 tablet 3     rOPINIRole (REQUIP) 0.5 MG tablet Take 1-2 tablets (0.5-1 mg) by mouth At Bedtime 60 tablet 0     traZODone (DESYREL) 100 MG tablet TAKE ONE TABLET BY MOUTH EVERY NIGHT AT BEDTIME 90 tablet 1     order for DME Equipment being ordered: CPAP Cas Noriega received a [employer] AirSense 10 CPAP. Pressures were set at Auto 11 - 15 cm H2O.       ORDER FOR DME one digital automatic Home blood pressure machine ( per insurance) (Patient not taking: Reported on 12/4/2017) 1 Device 0     No facility-administered encounter medications on file as of 9/17/2018.        Again, thank you for allowing me to participate in the care of  your patient.      Sincerely,    Mayuri Lawson PA-C     [facility]

## 2018-09-18 ENCOUNTER — OFFICE VISIT (OUTPATIENT)
Dept: AUDIOLOGY | Facility: CLINIC | Age: 73
End: 2018-09-18
Payer: COMMERCIAL

## 2018-09-18 DIAGNOSIS — H90.3 SENSORINEURAL HEARING LOSS, BILATERAL: Primary | ICD-10-CM

## 2018-09-18 NOTE — PROGRESS NOTES
AUDIOLOGY REPORT    BACKGROUND INFORMATION: Dr. Mickey Turpin implanted Cas Noriega with a right  Advanced tamycas HiRes 90k Advantage midscala MS cochlear implant (CI) on 12/17/2015 and a left HiRes 90k Ultra CI on 3/27/2017 due to bilateral severe to profound sensorineural hearing loss and lack of benefit from hearing aids. The patient is being seen for programming on 9/18/2018 in Audiology at the [facility].    PATIENT REPORT: Patient reported that he has had continued issues with left intermittency. It only works for 5 hours per day maximum. He also reported that he seems to not be hearing as well with the right CI. Recently got a new phone and needs help connecting with [employer].     FITTING SESSION: Dr. Mickey Turpin, cochlear implant surgeon, ordered today's appointment. The patient came to the clinic for adjustment to the programs in the external speech processor and for assessment of the external components of the cochlear implant system. These components provide power and data to the internal device. Sound is only heard once the external portion is activated. Postoperative treatment, including device fitting and adjustment, audiologic assessments, and training are required at regular intervals. Testing can include electropsychophysical measures of threshold, comfort, and loudness balancing, which are completed to update the program.    Processor type: Sellf CI Q90  Headpiece type: UHP  Magnet strength: Right: 3, Left: 4    TEST RESULTS:   Electrode Impedances: Right: Stable and within tolerances, Left: Slightly elevated but within tolerances  Neural Response Testing: Not tested today  Facial Stimulation: Absent  Tinnitus: Present  Balance Problems: Absent  Pain/Discomfort: Absent  Strategies Tried: Optima P, Optima S  Strategy Preference: Right: Optima P, Left: Optima S    Programs: Left  1. Omnidirectional  2. Ultra Zoom    Programs: Right:  1.  Measured M-levels - Omnidirectional  2. Ultra Zoom    Number of Channels per Program: 16     COMMENTS: M levels were measured at the right using loudness scaling with tone bursts. Overall M levels increased significantly across the array. He reported comfort with the volume and clarity was improved.    Troubleshooting of equipment revealed one of the left processors was not functioning (SN: [id number]). During mapping, the left implant lost lock multiple times. A request will be made to [employer] for a new processor and cable.     Both of Cas's back-up processors were programmed for the right ear. We initialized one of the processors to be a left processor. The back-up processors were programmed with today's MAPs. Paired [employer] with Cas's new phone.     SUMMARY AND RECOMMENDATIONS: Cas was seen for bilateral cochlear implant programming today. The new processor and cable will be sent to his home. He will return annually for follow-up, sooner if concerns arise. Please call this clinic with questions regarding these results or recommendations.    Johnna Adkins M.A.  Audiology Doctoral Extern    I was present with the patient for the entire Audiology appointment including all procedures/testing performed by the AuD student, and agree with the student s assessment and plan as documented.      Peña Childress  Licensed Audiologist  [place] License #[id number]

## 2018-09-18 NOTE — PROGRESS NOTES
Service Date: 09/17/2018      HISTORY OF PRESENT ILLNESS:  Mr. Noriega is a pleasant 73-year-old gentleman who presents to Cardiology office today for routine 2-month followup.      Today is the first time I am meeting Mr. Noriega.  In reviewing the records, it appears that his history includes coronary artery disease with history of an inferior wall myocardial infarction in about 2006 which was treated at an outside hospital with stent placement to the RCA at that time, followed by coronary artery bypass surgery.  The patient does not remember any details of the event, and I do not see any cardiac records scanned into our system.  He also has hypertension, hyperlipidemia, sleep apnea and a history of mild to moderate carotid artery disease.  He has followed very closely with Dr. Hua for a number of years and overall has done well.      At this time, he continues to do well.  He denies any chest discomfort, shortness of breath, orthopnea, PND or lower extremity edema.  He states that he has noticed some sweating with exertion, but this is not particularly unusual for him.  Over the last month in the hot and humid weather, he may have been sweating a bit more, but he does not feel that this is an overall major change.  He does not remember having any anginal-type symptoms prior to his MI ~10 years ago.  It sounds like his presentation was either syncope or cardiac arrest.      CURRENT CARDIAC MEDICATIONS:   1.  Aspirin 81 mg 2 tablets daily.   2.  Atenolol 50 mg daily.   3.  Atorvastatin 80 mg daily.   4.  Hydrochlorothiazide 25 mg daily.   5.  Lisinopril 40 mg daily.   6.  Sublingual nitro p.r.n.      The remainder of his medications, allergies and review of systems were reviewed and as are documented separately.      PHYSICAL EXAMINATION:   GENERAL:  The patient is a pleasant 73-year-old gentleman who appears his stated age.  He is in no apparent distress.   VITAL SIGNS:  His blood pressure is 114/60, pulse is  45.  His weight is 236 pounds, which is stable.   LUNGS:  Breathing is nonlabored.  Lungs are clear to auscultation.   CARDIAC:  Reveals a regular rate and rhythm.  He did have an occasional ectopic beat, no murmur is appreciated.   EXTREMITIES:  Lower extremities show no evidence of edema.   NEUROLOGIC:  Alert and oriented.      He had a basic metabolic panel on the 14th of this month.  His sodium is 138, potassium 3.7, BUN is 12, creatinine 0.8.      ASSESSMENT AND PLAN:  Mr. Noriega is a pleasant 73-year-old gentleman with a history of coronary artery disease with prior coronary artery bypass surgery (and possible stenting prior to this), details of this are unclear, history of mild to moderate carotid artery disease, hypertension and hyperlipidemia who presents for routine followup.  Overall, he is doing well and is asymptomatic from a cardiac standpoint.  His blood pressure and lipids are well controlled.  I did not make any medication changes today.  He will follow up in the Cardiology office as previously directed by Dr. Hua in the spring of 2019 to establish care with a new cardiologist with repeat blood work prior to that office visit.  Of course, I encouraged him to contact us sooner with questions or concerns.         LIANE MORE PA-C             D: 2018   T: 2018   MT: BILL      Name:     CHARLES NORIEGA   MRN:      [id number]        Account:      [id number]   :      1945           Service Date: 2018      Document: [id number]

## 2018-09-18 NOTE — MR AVS SNAPSHOT
After Visit Summary   9/18/2018    Cas Noriega    MRN: [id number]           Patient Information     Date Of Birth          1945        Visit Information        Provider Department      9/18/2018 1:00 PM Macrina Barriga, [facility] Audiology        Today's Diagnoses     Sensorineural hearing loss, bilateral    -  1       Follow-ups after your visit        Who to contact     Please call your clinic at [phone] to:    Ask questions about your health    Make or cancel appointments    Discuss your medicines    Learn about your test results    Speak to your doctor            Additional Information About Your Visit        MyChart Information     [employer] gives you secure access to your electronic health record. If you see a primary care provider, you can also send messages to your care team and make appointments. If you have questions, please call your primary care clinic.  If you do not have a primary care provider, please call [phone] and they will assist you.      [employer] is an electronic gateway that provides easy, online access to your medical records. With [employer], you can request a clinic appointment, read your test results, renew a prescription or communicate with your care team.     To access your existing account, please contact your [facility] or call [phone] for assistance.        Care EveryWhere ID     This is your Care EveryWhere ID. This could be used by other organizations to access your [facility] medical records  [id number]         Blood Pressure from Last 3 Encounters:   09/17/18 114/60   08/09/18 130/70   07/20/18 125/70    Weight from Last 3 Encounters:   09/17/18 107 kg (236 lb)   08/09/18 108 kg (238 lb)   07/20/18 107.4 kg (236 lb 12.8 oz)              We Performed the Following     LT: Diagnostic Analysis of CI 7 yrs & over, Subsequent Programming   (18522)     RT: Diagnostic Analysis of CI 7 yrs & over, Subsequent Programming    (41967)        Primary Care Provider Office Phone # Fax #    Sera Garcia PA-C [phone] [address] [place] [address]        Equal Access to Services     MARIA A ARANA : Hadboni holder ku mehnazo Sokirstinali, waaxda luqadaha, qaybta kaalmada adeegyada, rio garcian corey castroprecious vela. So [facility] [phone].    ATENCIÓN: Si habla español, tiene a holbrook disposición servicios gratuitos de asistencia lingüística. Llame al [phone].    We comply with applicable federal civil rights laws and [place] laws. We do not discriminate on the basis of race, color, national origin, age, disability, sex, sexual orientation, or gender identity.            Thank you!     Thank you for choosing [facility] AUDIOLOGY  for your care. Our goal is always to provide you with excellent care. Hearing back from our patients is one way we can continue to improve our services. Please take a few minutes to complete the written survey that you may receive in the mail after your visit with us. Thank you!             Your Updated Medication List - Protect others around you: Learn how to safely use, store and throw away your medicines at www.disposemymeds.org.          This list is accurate as of 9/18/18 11:59 PM.  Always use your most recent med list.                   Brand Name Dispense Instructions for use Diagnosis    ACETAMINOPHEN PO      Take 500 mg by mouth 2 every 6 hours, morning, noon and night. As Needed for shoulder.        albuterol 108 (90 Base) MCG/ACT inhaler    PROAIR HFA/PROVENTIL HFA/VENTOLIN HFA    1 Inhaler    Inhale 2 puffs into the lungs every 6 hours as needed for shortness of breath / dyspnea or wheezing    Intermittent asthma, uncomplicated       aspirin 81 MG EC tablet     60 tablet    Take 2 tablets (162 mg) by mouth daily    Coronary artery disease involving native coronary artery of native heart without angina pectoris       atenolol 50 MG tablet    TENORMIN    90 tablet    Take 1  tablet (50 mg) by mouth daily    Essential hypertension with goal blood pressure less than 140/90       atorvastatin 80 MG tablet    LIPITOR    90 tablet    Take 1 tablet (80 mg) by mouth daily    Hyperlipidemia with target LDL less than 70       carbidopa-levodopa  MG per tablet    SINEMET    180 tablet    TAKE 2 TABLETS BY MOUTH DAILY AT BEDTIME.    Restless legs syndrome (RLS)       cefadroxil 500 MG capsule    DURICEF    180 capsule    TAKE ONE CAPSULE BY MOUTH TWICE A DAY    Prosthetic joint infection, sequela       fluorouracil 5 % cream    EFUDEX    40 g    Apply topically 2 times daily    Actinic keratosis       fluticasone 50 MCG/ACT spray    FLONASE    48 g    USE ONE TO TWO SPRAYS INTO BOTH NOSTRILS ONCE DAILY    Chronic nasal congestion       hydrochlorothiazide 25 MG tablet    HYDRODIURIL    90 tablet    Take 1 tablet (25 mg) by mouth every morning    Essential hypertension with goal blood pressure less than 140/90       lactobacillus rhamnosus (GG) capsule     90 capsule    Take 1 capsule by mouth daily    Encounter for long-term (current) use of antibiotics       lisinopril 40 MG tablet    PRINIVIL/ZESTRIL    90 tablet    Take 1 tablet (40 mg) by mouth daily    Essential hypertension with goal blood pressure less than 140/90       nitroGLYcerin 0.4 MG sublingual tablet    NITROSTAT    25 tablet    Place 1 tablet (0.4 mg) under the tongue every 5 minutes as needed for chest pain May repeat x 3. If no relief, call 911.    Coronary artery disease involving native heart with angina pectoris, unspecified vessel or lesion type (H)       order for DME      Equipment being ordered: CPAP Cas Noriega received a 3 Four 5 Group AirSense 10 CPAP. Pressures were set at Auto 11 - 15 cm H2O.        order for DME     1 Device    one digital automatic Home blood pressure machine ( per insurance)    Unspecified essential hypertension       rOPINIRole 0.5 MG tablet    REQUIP    60 tablet    Take 1-2 tablets (0.5-1  mg) by mouth At Bedtime    Restless leg syndrome       traZODone 100 MG tablet    DESYREL    90 tablet    TAKE ONE TABLET BY MOUTH EVERY NIGHT AT BEDTIME    Insomnia, unspecified type

## 2019-02-06 DIAGNOSIS — I10 ESSENTIAL HYPERTENSION WITH GOAL BLOOD PRESSURE LESS THAN 140/90: ICD-10-CM

## 2019-02-06 DIAGNOSIS — G47.00 INSOMNIA, UNSPECIFIED TYPE: ICD-10-CM

## 2019-02-06 DIAGNOSIS — G25.81 RESTLESS LEGS SYNDROME (RLS): ICD-10-CM

## 2019-02-06 RX ORDER — TRAZODONE HYDROCHLORIDE 100 MG/1
TABLET ORAL
Qty: 90 TABLET | Refills: 1 | Status: SHIPPED | OUTPATIENT
Start: 2019-02-06 | End: 2019-07-08

## 2019-02-06 RX ORDER — CARBIDOPA AND LEVODOPA 25; 100 MG/1; MG/1
TABLET ORAL
Qty: 180 TABLET | Refills: 1 | Status: SHIPPED | OUTPATIENT
Start: 2019-02-06 | End: 2019-05-16

## 2019-02-06 RX ORDER — LISINOPRIL 40 MG/1
TABLET ORAL
Qty: 90 TABLET | Refills: 1 | Status: SHIPPED | OUTPATIENT
Start: 2019-02-06 | End: 2019-05-16

## 2019-05-02 DIAGNOSIS — T84.50XS PROSTHETIC JOINT INFECTION, SEQUELA: ICD-10-CM

## 2019-05-03 RX ORDER — CEFADROXIL 500 MG/1
CAPSULE ORAL
Qty: 60 CAPSULE | Refills: 0 | Status: SHIPPED | OUTPATIENT
Start: 2019-05-03 | End: 2019-05-16

## 2019-05-03 NOTE — TELEPHONE ENCOUNTER
Requested Prescriptions   Pending Prescriptions Disp Refills     cefadroxil (DURICEF) 500 MG capsule [Pharmacy Med Name: CEFADROXIL 500 MG Capsule] 180 capsule 2     Sig: TAKE 1 CAPSULE TWICE DAILY       There is no refill protocol information for this order        cefadroxil (DURICEF) 500 MG capsule  Last Written Prescription Date:  07/11/2018  Last Fill Quantity: 180 capsule,  # refills: 2   Last office visit: 8/9/2018 with prescribing provider:  JOSÉ MIGUEL Lui   [place] Office Visit:      Flores BANERJEE) (M)

## 2019-05-03 NOTE — TELEPHONE ENCOUNTER
Routing refill request to provider for review/approval because:  Drug not on the [facility] refill protocol     Imelda SEWELL RN

## 2019-05-03 NOTE — TELEPHONE ENCOUNTER
Notify patient that I refilled his antibiotics x 1 month, but he hasn't seen me since 2017 so I need to see him before further refills.

## 2019-05-06 ENCOUNTER — ALLIED HEALTH/NURSE VISIT (OUTPATIENT)
Dept: FAMILY MEDICINE | Facility: CLINIC | Age: 74
End: 2019-05-06
Payer: COMMERCIAL

## 2019-05-06 DIAGNOSIS — I10 ESSENTIAL HYPERTENSION WITH GOAL BLOOD PRESSURE LESS THAN 140/90: Primary | ICD-10-CM

## 2019-05-06 PROCEDURE — 99207 ZZC NO CHARGE NURSE ONLY: CPT

## 2019-05-07 DIAGNOSIS — R09.81 CHRONIC NASAL CONGESTION: ICD-10-CM

## 2019-05-07 RX ORDER — FLUTICASONE PROPIONATE 50 MCG
SPRAY, SUSPENSION (ML) NASAL
Qty: 48 G | Refills: 0 | Status: SHIPPED | OUTPATIENT
Start: 2019-05-07 | End: 2019-07-08

## 2019-05-16 ENCOUNTER — TELEPHONE (OUTPATIENT)
Dept: SLEEP MEDICINE | Facility: CLINIC | Age: 74
End: 2019-05-16

## 2019-05-16 ENCOUNTER — OFFICE VISIT (OUTPATIENT)
Dept: FAMILY MEDICINE | Facility: CLINIC | Age: 74
End: 2019-05-16
Payer: COMMERCIAL

## 2019-05-16 VITALS
SYSTOLIC BLOOD PRESSURE: 129 MMHG | HEIGHT: 68 IN | RESPIRATION RATE: 16 BRPM | TEMPERATURE: 97.9 F | HEART RATE: 49 BPM | DIASTOLIC BLOOD PRESSURE: 76 MMHG | WEIGHT: 233 LBS | BODY MASS INDEX: 35.31 KG/M2

## 2019-05-16 DIAGNOSIS — I10 ESSENTIAL HYPERTENSION: ICD-10-CM

## 2019-05-16 DIAGNOSIS — G25.81 RESTLESS LEGS SYNDROME (RLS): ICD-10-CM

## 2019-05-16 DIAGNOSIS — R79.9 ABNORMAL FINDING OF BLOOD CHEMISTRY: ICD-10-CM

## 2019-05-16 DIAGNOSIS — G47.33 OSA (OBSTRUCTIVE SLEEP APNEA): ICD-10-CM

## 2019-05-16 DIAGNOSIS — E78.5 HYPERLIPIDEMIA WITH TARGET LDL LESS THAN 70: ICD-10-CM

## 2019-05-16 DIAGNOSIS — R73.03 PREDIABETES: ICD-10-CM

## 2019-05-16 DIAGNOSIS — I25.10 CORONARY ARTERY DISEASE INVOLVING NATIVE HEART WITHOUT ANGINA PECTORIS, UNSPECIFIED VESSEL OR LESION TYPE: ICD-10-CM

## 2019-05-16 DIAGNOSIS — Z96.619 INFECTION OF PROSTHETIC SHOULDER JOINT, SUBSEQUENT ENCOUNTER: Primary | ICD-10-CM

## 2019-05-16 DIAGNOSIS — R73.09 ELEVATED GLUCOSE: ICD-10-CM

## 2019-05-16 DIAGNOSIS — E66.01 MORBID OBESITY (H): ICD-10-CM

## 2019-05-16 DIAGNOSIS — T84.59XD INFECTION OF PROSTHETIC SHOULDER JOINT, SUBSEQUENT ENCOUNTER: Primary | ICD-10-CM

## 2019-05-16 DIAGNOSIS — F32.0 MILD MAJOR DEPRESSION (H): ICD-10-CM

## 2019-05-16 LAB
ALBUMIN SERPL-MCNC: 4.1 G/DL (ref 3.4–5)
ALP SERPL-CCNC: 81 U/L (ref 40–150)
ALT SERPL W P-5'-P-CCNC: 41 U/L (ref 0–70)
ANION GAP SERPL CALCULATED.3IONS-SCNC: 4 MMOL/L (ref 3–14)
AST SERPL W P-5'-P-CCNC: 41 U/L (ref 0–45)
BILIRUB SERPL-MCNC: 0.9 MG/DL (ref 0.2–1.3)
BUN SERPL-MCNC: 14 MG/DL (ref 7–30)
CALCIUM SERPL-MCNC: 9.6 MG/DL (ref 8.5–10.1)
CHLORIDE SERPL-SCNC: 105 MMOL/L (ref 94–109)
CHOLEST SERPL-MCNC: 134 MG/DL
CO2 SERPL-SCNC: 30 MMOL/L (ref 20–32)
CREAT SERPL-MCNC: 0.84 MG/DL (ref 0.66–1.25)
ERYTHROCYTE [DISTWIDTH] IN BLOOD BY AUTOMATED COUNT: 13.7 % (ref 10–15)
FERRITIN SERPL-MCNC: 124 NG/ML (ref 26–388)
GFR SERPL CREATININE-BSD FRML MDRD: 85 ML/MIN/{1.73_M2}
GLUCOSE SERPL-MCNC: 102 MG/DL (ref 70–99)
HBA1C MFR BLD: 5.8 % (ref 0–5.6)
HCT VFR BLD AUTO: 49.1 % (ref 40–53)
HDLC SERPL-MCNC: 42 MG/DL
HGB BLD-MCNC: 16.3 G/DL (ref 13.3–17.7)
IRON SATN MFR SERPL: 53 % (ref 15–46)
IRON SERPL-MCNC: 154 UG/DL (ref 35–180)
LDLC SERPL CALC-MCNC: 72 MG/DL
MCH RBC QN AUTO: 30.6 PG (ref 26.5–33)
MCHC RBC AUTO-ENTMCNC: 33.2 G/DL (ref 31.5–36.5)
MCV RBC AUTO: 92 FL (ref 78–100)
NONHDLC SERPL-MCNC: 92 MG/DL
PLATELET # BLD AUTO: 213 10E9/L (ref 150–450)
POTASSIUM SERPL-SCNC: 4 MMOL/L (ref 3.4–5.3)
PROT SERPL-MCNC: 7.2 G/DL (ref 6.8–8.8)
RBC # BLD AUTO: 5.32 10E12/L (ref 4.4–5.9)
SODIUM SERPL-SCNC: 139 MMOL/L (ref 133–144)
TIBC SERPL-MCNC: 293 UG/DL (ref 240–430)
TRIGL SERPL-MCNC: 100 MG/DL
TSH SERPL DL<=0.005 MIU/L-ACNC: 1.08 MU/L (ref 0.4–4)
WBC # BLD AUTO: 8.4 10E9/L (ref 4–11)

## 2019-05-16 PROCEDURE — 82728 ASSAY OF FERRITIN: CPT | Performed by: PHYSICIAN ASSISTANT

## 2019-05-16 PROCEDURE — 83550 IRON BINDING TEST: CPT | Performed by: PHYSICIAN ASSISTANT

## 2019-05-16 PROCEDURE — 85027 COMPLETE CBC AUTOMATED: CPT | Performed by: PHYSICIAN ASSISTANT

## 2019-05-16 PROCEDURE — 99214 OFFICE O/P EST MOD 30 MIN: CPT | Performed by: PHYSICIAN ASSISTANT

## 2019-05-16 PROCEDURE — 83540 ASSAY OF IRON: CPT | Performed by: PHYSICIAN ASSISTANT

## 2019-05-16 PROCEDURE — 80061 LIPID PANEL: CPT | Performed by: PHYSICIAN ASSISTANT

## 2019-05-16 PROCEDURE — 84443 ASSAY THYROID STIM HORMONE: CPT | Performed by: PHYSICIAN ASSISTANT

## 2019-05-16 PROCEDURE — 83036 HEMOGLOBIN GLYCOSYLATED A1C: CPT | Performed by: PHYSICIAN ASSISTANT

## 2019-05-16 PROCEDURE — 80053 COMPREHEN METABOLIC PANEL: CPT | Performed by: PHYSICIAN ASSISTANT

## 2019-05-16 PROCEDURE — 36415 COLL VENOUS BLD VENIPUNCTURE: CPT | Performed by: PHYSICIAN ASSISTANT

## 2019-05-16 RX ORDER — LACTOBACILLUS ACIDOPHILUS 20B CELL
1 CAPSULE ORAL DAILY
Qty: 90 CAPSULE | Refills: 3 | Status: SHIPPED | OUTPATIENT
Start: 2019-05-16

## 2019-05-16 RX ORDER — GABAPENTIN 100 MG/1
100-300 CAPSULE ORAL AT BEDTIME
Qty: 270 CAPSULE | Refills: 0 | Status: SHIPPED | OUTPATIENT
Start: 2019-05-16 | End: 2019-07-08

## 2019-05-16 RX ORDER — HYDROCHLOROTHIAZIDE 25 MG/1
25 TABLET ORAL EVERY MORNING
Qty: 90 TABLET | Refills: 3 | Status: SHIPPED | OUTPATIENT
Start: 2019-05-16 | End: 2020-05-14

## 2019-05-16 RX ORDER — CEFADROXIL 500 MG/1
CAPSULE ORAL
Qty: 180 CAPSULE | Refills: 3 | Status: SHIPPED | OUTPATIENT
Start: 2019-05-16 | End: 2020-05-14

## 2019-05-16 RX ORDER — ATORVASTATIN CALCIUM 80 MG/1
80 TABLET, FILM COATED ORAL DAILY
Qty: 90 TABLET | Refills: 3 | Status: SHIPPED | OUTPATIENT
Start: 2019-05-16 | End: 2020-05-07

## 2019-05-16 RX ORDER — ATENOLOL 50 MG/1
50 TABLET ORAL DAILY
Qty: 90 TABLET | Refills: 3 | Status: SHIPPED | OUTPATIENT
Start: 2019-05-16 | End: 2020-05-07

## 2019-05-16 RX ORDER — LISINOPRIL 40 MG/1
TABLET ORAL
Qty: 90 TABLET | Refills: 3 | Status: SHIPPED | OUTPATIENT
Start: 2019-05-16 | End: 2020-05-07

## 2019-05-16 RX ORDER — CARBIDOPA AND LEVODOPA 25; 100 MG/1; MG/1
TABLET ORAL
Qty: 180 TABLET | Refills: 0 | Status: SHIPPED | OUTPATIENT
Start: 2019-05-16 | End: 2019-09-06

## 2019-05-16 ASSESSMENT — ANXIETY QUESTIONNAIRES
6. BECOMING EASILY ANNOYED OR IRRITABLE: NOT AT ALL
4. TROUBLE RELAXING: NOT AT ALL
GAD7 TOTAL SCORE: 0
GAD7 TOTAL SCORE: 0
3. WORRYING TOO MUCH ABOUT DIFFERENT THINGS: NOT AT ALL
5. BEING SO RESTLESS THAT IT IS HARD TO SIT STILL: NOT AT ALL
GAD7 TOTAL SCORE: 0
1. FEELING NERVOUS, ANXIOUS, OR ON EDGE: NOT AT ALL
7. FEELING AFRAID AS IF SOMETHING AWFUL MIGHT HAPPEN: NOT AT ALL
2. NOT BEING ABLE TO STOP OR CONTROL WORRYING: NOT AT ALL
7. FEELING AFRAID AS IF SOMETHING AWFUL MIGHT HAPPEN: NOT AT ALL

## 2019-05-16 ASSESSMENT — PATIENT HEALTH QUESTIONNAIRE - PHQ9
SUM OF ALL RESPONSES TO PHQ QUESTIONS 1-9: 0
SUM OF ALL RESPONSES TO PHQ QUESTIONS 1-9: 0
10. IF YOU CHECKED OFF ANY PROBLEMS, HOW DIFFICULT HAVE THESE PROBLEMS MADE IT FOR YOU TO DO YOUR WORK, TAKE CARE OF THINGS AT HOME, OR GET ALONG WITH OTHER PEOPLE: NOT DIFFICULT AT ALL

## 2019-05-16 ASSESSMENT — MIFFLIN-ST. JEOR: SCORE: 1766.38

## 2019-05-16 NOTE — PATIENT INSTRUCTIONS
Labs today     Set up cardiology recheck    Set up sleep apnea recheck    Trying to get probiotic as prescription     Restless legs -  Labs today  Try adding gabapentin.  1-3 pills per night.  Watch out for dry mouth, or being tired or dizzy.  We can increase dose if needed for symptoms.  Discuss further with sleep specialist    Nail fungus -  Treat only if bothering you  Decided not to treat, is not causing symptoms     Recheck with me in 2 months, after you've seen above specialists

## 2019-05-16 NOTE — RESULT ENCOUNTER NOTE
Ivania Cardozo,    Labs show somewhat higher blood sugars but not yet diabetes.  I'll retest this yearly.  Kidney function and blood salts normal.  Labs for other causes of restless legs were normal.  See if the gabapentin helps, and discuss further with the sleep specialist.    Please contact me if you have questions.    Sera Garcia PA-C

## 2019-05-16 NOTE — TELEPHONE ENCOUNTER
Reason for Call:  Other appointment    Detailed comments: patient requesting a sooner appointment than 7/26/19 with . Please [email or web address] or text appointment as patient is hard of hearing.     Phone Number Patient can be reached at: Home number on file [phone] (home)    Best Time: anytime     Can we leave a detailed message on this number? YES    Call taken on 5/16/2019 at 1:59 PM by Shazia Guillen

## 2019-05-16 NOTE — TELEPHONE ENCOUNTER
Called a LVM for pt to call back. We are able to put him at the 3 pm hold spot on June 7th, 2019 if he is able to be seen that day. Otherwise, we can place him on the wait list to try and fit him on a different date if 6/7/19 doesn't work. No other earlier appointments are available at this time.    Jennifer Hollingsworth, CMA

## 2019-05-16 NOTE — NURSING NOTE
"No chief complaint on file.      Initial /76 (BP Location: Right arm, Patient Position: Chair, Cuff Size: Adult Large)   Pulse (!) 49   Wt 105.7 kg (233 lb)   BMI 35.31 kg/m   Estimated body mass index is 35.31 kg/m  as calculated from the following:    Height as of 11/20/17: 1.73 m (5' 8.11\").    Weight as of this encounter: 105.7 kg (233 lb).    Patient presents to the clinic using No DME    Health Maintenance that is potentially due pending provider review:  NONE        Is there anyone who you would like to be able to receive your results? No  If yes have patient fill out JACE        "

## 2019-05-16 NOTE — PROGRESS NOTES
"  SUBJECTIVE:   Cas Noriega is a 73 year old male who presents to clinic today for the following   health issues:    * Med Rec-  Requip, doesn't remember being on it    * Still taking High dose aspirin every other day, was told to take it that way by the cardiologist until the bottle is gone.  Wondering if he should still be doing that or just going down to the Aspirin 81 mg.    Medication Followup of All medications    Taking Medication as prescribed: yes    Side Effects:  None    Medication Helping Symptoms:  yes     See my 2017 note for history.    Right shoulder pain-starting to get pain in the front of his arm.  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    CAD - asymptomatic.  Last cardiology Sept 2018. No chest pains, chest pressures, SOB or sudden change of endurance.  Still a bit out of breathe with stairs but this is unchanged.  HTN - checks occasionally, usually \"pretty close to 130/60-80).    Severe MARTY - CPAP nightly, due for appt.  Currently taking sinemet , 2 tabs.    On chronic antibiotics for infected total joint.  Wants to continue this so long as he is on antibiotics for infected total joint (shoulder).     RLS, on sinemet for years.  Remote history anemia.    No dry mouth.    Nail Problem- Right big toe, having nail fungus, tried vicks.    Additional history: as documented    Reviewed  and updated as needed this visit by clinical staff  Tobacco  Meds         Reviewed and updated as needed this visit by Provider         BP Readings from Last 3 Encounters:   05/16/19 129/76   09/17/18 114/60   08/09/18 130/70    Wt Readings from Last 3 Encounters:   05/16/19 105.7 kg (233 lb)   09/17/18 107 kg (236 lb)   08/09/18 108 kg (238 lb)            Labs reviewed in EPIC    ROS:  Constitutional, HEENT, cardiovascular, pulmonary, musculoskeletal, neuro, and psych systems are negative, except as otherwise noted.    OBJECTIVE:     /76 (BP " "Location: Right arm, Patient Position: Chair, Cuff Size: Adult Large)   Pulse (!) 49   Temp 97.9  F (36.6  C) (Oral)   Resp 16   Ht 1.727 m (5' 8\")   Wt 105.7 kg (233 lb)   BMI 35.43 kg/m    Body mass index is 35.43 kg/m .  GENERAL: healthy, alert and no distress  RESP: lungs clear to auscultation - no rales, rhonchi or wheezes  CV: regular rate and rhythm, normal S1 S2, no S3 or S4, no murmur, click or rub, no peripheral edema   PSYCH: mentation appears normal, affect normal/bright  Diabetic foot exam: normal DP and PT pulses, no trophic changes or ulcerative lesions and normal monofilament exam, R great toe onychomycosis    ASSESSMENT/PLAN:       ICD-10-CM    1. Infection of prosthetic shoulder joint, subsequent encounter T84.59XD Lactobacillus (FLORAJEN ACIDOPHILUS) CAPS    Z96.619 cefadroxil (DURICEF) 500 MG capsule   2. Restless legs syndrome (RLS) G25.81 carbidopa-levodopa (SINEMET)  MG tablet     SLEEP EVALUATION & MANAGEMENT REFERRAL - [facility]  [phone] (Age 2 and up)     Comprehensive metabolic panel     CBC with platelets     Ferritin     Iron and iron binding capacity     TSH with free T4 reflex     gabapentin (NEURONTIN) 100 MG capsule   3. Coronary artery disease involving native heart without angina pectoris, unspecified vessel or lesion type I25.10 CARDIOLOGY EVAL ADULT REFERRAL     atorvastatin (LIPITOR) 80 MG tablet     Lipid panel reflex to direct LDL Fasting   4. Morbid obesity (H) E66.01 TSH with free T4 reflex   5. 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 Essential hypertension I10 hydrochlorothiazide (HYDRODIURIL) 25 MG tablet     lisinopril (PRINIVIL/ZESTRIL) 40 MG tablet     atenolol (TENORMIN) 50 MG tablet     Comprehensive metabolic panel   7. MARTY (obstructive sleep apnea) G47.33 SLEEP EVALUATION & MANAGEMENT REFERRAL - [facility]  [phone] (Age 2 and up)   8. Hyperlipidemia with target LDL less than 70 E78.5 " atorvastatin (LIPITOR) 80 MG tablet     Lipid panel reflex to direct LDL Fasting   9. Abnormal finding of blood chemistry  R79.9 Ferritin     Iron and iron binding capacity   10. Elevated glucose R73.09 Hemoglobin A1c   11. Prediabetes R73.03        Patient Instructions   Labs today     Set up cardiology recheck    Set up sleep apnea recheck    Trying to get probiotic as prescription     Restless legs -  Labs today  Try adding gabapentin.  1-3 pills per night.  Watch out for dry mouth, or being tired or dizzy.  We can increase dose if needed for symptoms.  Discuss further with sleep specialist    Nail fungus -  Treat only if bothering you  Decided not to treat, is not causing symptoms     Recheck with me in 2 months, after you've seen above specialists      Sera Garcia PA-C  [facility]

## 2019-05-17 ASSESSMENT — PATIENT HEALTH QUESTIONNAIRE - PHQ9: SUM OF ALL RESPONSES TO PHQ QUESTIONS 1-9: 0

## 2019-05-17 ASSESSMENT — ASTHMA QUESTIONNAIRES: ACT_TOTALSCORE: 23

## 2019-05-17 ASSESSMENT — ANXIETY QUESTIONNAIRES: GAD7 TOTAL SCORE: 0

## 2019-06-07 ENCOUNTER — OFFICE VISIT (OUTPATIENT)
Dept: CARDIOLOGY | Facility: CLINIC | Age: 74
End: 2019-06-07
Attending: INTERNAL MEDICINE
Payer: COMMERCIAL

## 2019-06-07 VITALS
WEIGHT: 233 LBS | HEART RATE: 52 BPM | SYSTOLIC BLOOD PRESSURE: 131 MMHG | OXYGEN SATURATION: 96 % | BODY MASS INDEX: 35.43 KG/M2 | DIASTOLIC BLOOD PRESSURE: 69 MMHG

## 2019-06-07 DIAGNOSIS — E78.5 HYPERLIPIDEMIA WITH TARGET LDL LESS THAN 70: ICD-10-CM

## 2019-06-07 DIAGNOSIS — I25.10 CORONARY ARTERY DISEASE INVOLVING NATIVE CORONARY ARTERY OF NATIVE HEART WITHOUT ANGINA PECTORIS: ICD-10-CM

## 2019-06-07 DIAGNOSIS — I10 ESSENTIAL HYPERTENSION WITH GOAL BLOOD PRESSURE LESS THAN 140/90: ICD-10-CM

## 2019-06-07 PROCEDURE — 99214 OFFICE O/P EST MOD 30 MIN: CPT | Performed by: INTERNAL MEDICINE

## 2019-06-07 NOTE — LETTER
"6/7/2019      Sera Garcia PA-C  [address] [place] [id number]      RE: Cas Noriega       Dear Colleague,    I had the pleasure of seeing Cas Noriega in the [facility].    Service Date: 06/07/2019      CARDIOLOGY CONSULTATION      PATIENT HISTORY:  Mr. Cas Noriega is 73 years old and was seen in followup at [facility] in [place], [place].  This is my first visit with Mr. Noriega, who was previously followed by my now retired partner, Dr. Donny Hua.  Dr. Hua followed Mr. Noriega for a history of ischemic heart disease, prior inferior myocardial infarct and history of percutaneous coronary intervention, as well as subsequent coronary artery bypass graft surgery for multivessel disease.      Dr. Hua notes that \"minimal\" records have been available of Mr. Noriega's care through the [employer] system, specifically at [facility].  I now see in the Epic chart that there is an admission with the cardiac surgeon at [facility], Dr. Gonsales, but I cannot access that record as Mr. Noriega has currently left.  Permission will be obtained.      Apparently since that bypass surgery in 2007, Mr. Noriega has done very well.  He denies any chest discomfort.  He has been feeling well, but when questioned as to whether he has noted any changes, he believes he is somewhat more short of breath on the stairs of his split-level home and after walking in from the parking lot and climbing to the top of the stairs, he thought he was somewhat more short of breath at the top of the stairs than he has been in past years.  He thinks that his sherman spent in [place] doing minimal activity may be partially responsible and he was somewhat uncertain if his exertional tolerance has been improving since he returned to [place] or not.  His last answer was that he was not certain that his dyspnea with very significant exertion has " "actually been improving.      He does have a history of dyslipidemia, hypertension, sleep apnea and a prior history of tobacco use.  He is not limited in any of his activities by shortness of breath and as noted, he does not have any exertional limitation.  He has undergone bilateral cochlear implants and is very pleased with the result, noting that he can hear \"80%\" of what is said.      His last stress nuclear study was in 2017 and that demonstrated mid to basilar inferior/inferolateral scar without evidence of ischemia.  It was reported that there was mild inferior wall hypokinesis and low-normal left ventricular systolic performance with an ejection fraction of 51%.  Echocardiography done in 2018 indicated completely normal left ventricular systolic performance without regional wall motion abnormalities or significant valvular disease.      Mr. Noriega denies any palpitations and admits that he is feeling overall very well.  He does use an albuterol inhaler and Flonase nasal spray.      His lipids from last month indicated an LDL of 72, HDL 42 mg/dL and normal triglycerides.  His sodium at that time was 139, potassium 4 and he had normal renal function with a GFR of 85.  He has a hemoglobin of 16.3, normal platelets and a normal white blood cell count.  His hemoglobin A1c was 5.8.  He continues on atenolol 50 mg, aspirin, which he takes at 325 mg every other day, atorvastatin 80 mg daily and he has lisinopril prescribed.  He continues on a low dose of hydrochlorothiazide and he has nitroglycerin prescribed.      PHYSICAL EXAMINATION:   GENERAL:  This is a man in no apparent distress.  He was alert and oriented to person, place and time.   VITAL SIGNS:  Blood pressure was 131/69 mmHg, heart rate 52 beats per minute and regular, and the respiratory rate was 14-18 per minute.   CHEST:  Revealed a mild and diffuse decrease in breath sounds but without crackles or wheezes and breath sounds were heard throughout all " lung fields.   CARDIAC:  On cardiac auscultation, there was an S1 and S2 without extra sounds or murmur.  The rhythm was regular.      ASSESSMENT RECOMMENDATIONS:  With regard to his mild change in breathing with significant exertion, I have recommended a stress study.  I recommended a Lexiscan stress nuclear study, but Mr. Noriega notes that with his last stress nuclear study, his [employer] insurance did not cover the full cost of the stress study.  He actually contested his bill and [employer] told him that they discounted the cost of the tracers and Lexiscan by 50%, but he does not want to pay.  I have recommended a stress echocardiogram, which will serve several purposes, as long as Mr. Noriega is able to exercise on a modified protocol.  I will do this on beta blockade.  This will allow an assessment for chronotropic incompetence, valvular disease and ischemia on his medications.  He does have some hip problems and so I would recommend a modified protocol, not a Joaquin protocol, and it should be symptom-limited and not necessarily heart rate-limited.      Unless there is evidence of ischemia or other abnormality on the stress study, I would see Mr. Noriega back at 1 year.  He is doing very well and is on appropriate medical therapy with good control of his risk factors including his lipids and his blood pressure.      If the stress study does not demonstrate an obvious etiology for his increased albeit mild dyspnea on exertion, then followup with Sera Garcia would be recommended.  He may require some adjustment of his bronchodilator therapy.         LEONARDO STRATTON MD, [facility]             D: 2019   T: 06/10/2019   MT: al      Name:     CHARLES NORIEGA   MRN:      [id number]        Account:      [id number]   :      1945           Service Date: 2019      Document: [id number]         Outpatient Encounter Medications as of 2019   Medication Sig Dispense Refill     albuterol (PROAIR  HFA, PROVENTIL HFA, VENTOLIN HFA) 108 (90 BASE) MCG/ACT inhaler Inhale 2 puffs into the lungs every 6 hours as needed for shortness of breath / dyspnea or wheezing 1 Inhaler 11     aspirin (ASA) 325 MG EC tablet Take 325 mg by mouth every other day       atenolol (TENORMIN) 50 MG tablet Take 1 tablet (50 mg) by mouth daily 90 tablet 3     atorvastatin (LIPITOR) 80 MG tablet Take 1 tablet (80 mg) by mouth daily 90 tablet 3     carbidopa-levodopa (SINEMET)  MG tablet TAKE 2 TABLETS EVERY DAY AT BEDTIME 180 tablet 0     cefadroxil (DURICEF) 500 MG capsule TAKE 1 CAPSULE TWICE DAILY 180 capsule 3     fluorouracil (EFUDEX) 5 % cream Apply topically 2 times daily 40 g 3     fluticasone (FLONASE) 50 MCG/ACT nasal spray USE ONE TO TWO SPRAYS INTO BOTH NOSTRILS ONCE DAILY 48 g 0     gabapentin (NEURONTIN) 100 MG capsule Take 1-3 capsules (100-300 mg) by mouth At Bedtime For restless legs 270 capsule 0     hydrochlorothiazide (HYDRODIURIL) 25 MG tablet Take 1 tablet (25 mg) by mouth every morning 90 tablet 3     Lactobacillus (FLORAJEN ACIDOPHILUS) CAPS Take 1 capsule by mouth daily 90 capsule 3     lactobacillus rhamnosus, GG, (CULTURELL) capsule Take 1 capsule by mouth daily 90 capsule 11     lisinopril (PRINIVIL/ZESTRIL) 40 MG tablet TAKE 1 TABLET  (40MG) EVERY DAY 90 tablet 3     nitroGLYcerin (NITROSTAT) 0.4 MG sublingual tablet Place 1 tablet (0.4 mg) under the tongue every 5 minutes as needed for chest pain May repeat x 3. If no relief, call 911. 25 tablet 3     order for DME Equipment being ordered: CPAP Cas Noriega received a Pfenex AirSense 10 CPAP. Pressures were set at Auto 11 - 15 cm H2O.       traZODone (DESYREL) 100 MG tablet TAKE 1 TABLET EVERY NIGHT AT BEDTIME 90 tablet 1     ORDER FOR DME one digital automatic Home blood pressure machine ( per insurance) (Patient not taking: Reported on 12/4/2017) 1 Device 0     [DISCONTINUED] ACETAMINOPHEN PO Take 500 mg by mouth 2 every 6 hours, morning, noon  and night. As Needed for shoulder.       No facility-administered encounter medications on file as of 6/7/2019.        Again, thank you for allowing me to participate in the care of your patient.      Sincerely,    Sara Parker MD     [facility]

## 2019-06-07 NOTE — LETTER
6/7/2019    Sera Garcia PA-C  [address] [place] [id number]    RE: Cas Nroiega       Dear Colleague,    I had the pleasure of seeing Cas Noriega in the [facility].    HPI and Plan:   See dictation    No orders of the defined types were placed in this encounter.      No orders of the defined types were placed in this encounter.      Medications Discontinued During This Encounter   Medication Reason     ACETAMINOPHEN PO Stopped by Patient         Encounter Diagnoses   Name Primary?     Coronary artery disease involving native coronary artery of native heart without angina pectoris      Essential hypertension with goal blood pressure less than 140/90      Hyperlipidemia with target LDL less than 70        CURRENT MEDICATIONS:  Current Outpatient Medications   Medication Sig Dispense Refill     albuterol (PROAIR HFA, PROVENTIL HFA, VENTOLIN HFA) 108 (90 BASE) MCG/ACT inhaler Inhale 2 puffs into the lungs every 6 hours as needed for shortness of breath / dyspnea or wheezing 1 Inhaler 11     aspirin (ASA) 325 MG EC tablet Take 325 mg by mouth every other day       atenolol (TENORMIN) 50 MG tablet Take 1 tablet (50 mg) by mouth daily 90 tablet 3     atorvastatin (LIPITOR) 80 MG tablet Take 1 tablet (80 mg) by mouth daily 90 tablet 3     carbidopa-levodopa (SINEMET)  MG tablet TAKE 2 TABLETS EVERY DAY AT BEDTIME 180 tablet 0     cefadroxil (DURICEF) 500 MG capsule TAKE 1 CAPSULE TWICE DAILY 180 capsule 3     fluorouracil (EFUDEX) 5 % cream Apply topically 2 times daily 40 g 3     fluticasone (FLONASE) 50 MCG/ACT nasal spray USE ONE TO TWO SPRAYS INTO BOTH NOSTRILS ONCE DAILY 48 g 0     gabapentin (NEURONTIN) 100 MG capsule Take 1-3 capsules (100-300 mg) by mouth At Bedtime For restless legs 270 capsule 0     hydrochlorothiazide (HYDRODIURIL) 25 MG tablet Take 1 tablet (25 mg) by mouth every morning 90 tablet 3     Lactobacillus (FLORAJEN ACIDOPHILUS) CAPS  Take 1 capsule by mouth daily 90 capsule 3     lactobacillus rhamnosus, GG, (CULTURELL) capsule Take 1 capsule by mouth daily 90 capsule 11     lisinopril (PRINIVIL/ZESTRIL) 40 MG tablet TAKE 1 TABLET  (40MG) EVERY DAY 90 tablet 3     nitroGLYcerin (NITROSTAT) 0.4 MG sublingual tablet Place 1 tablet (0.4 mg) under the tongue every 5 minutes as needed for chest pain May repeat x 3. If no relief, call 911. 25 tablet 3     order for DME Equipment being ordered: CPAP Cas Noriega received a Allurent AirSense 10 CPAP. Pressures were set at Auto 11 - 15 cm H2O.       traZODone (DESYREL) 100 MG tablet TAKE 1 TABLET EVERY NIGHT AT BEDTIME 90 tablet 1     ORDER FOR DME one digital automatic Home blood pressure machine ( per insurance) (Patient not taking: Reported on 12/4/2017) 1 Device 0       ALLERGIES     Allergies   Allergen Reactions     Nka [No Known Allergies]        PAST MEDICAL HISTORY:  Past Medical History:   Diagnosis Date     Actinic keratosis      Acute myocardial infarction of other inferior wall, episode of care unspecified      Arthritis 1992     CAD (coronary artery disease) 2/27/2013     COPD (chronic obstructive pulmonary disease) (H) 1994     Diverticulitis of small intestine (without mention of hemorrhage) 2003    recovered     Generalized anxiety disorder 4/28/2010     Hyperlipidemia LDL goal < 70      Hypertension goal BP (blood pressure) < 140/90      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  RLS      Restless legs syndrome (RLS)      Rotator cuff disorder 6/23/2010    H/o rotator  cuff repair on right-6/05 L shoulder surgery pending-Dr. Hartley until insurance changes      S/P shoulder joint replacement 2/26/2013     Senile nuclear sclerosis 6/3/2012     Sensorineural hearing loss, bilateral 7/18/2012       PAST SURGICAL HISTORY:  Past Surgical History:   Procedure Laterality Date     ARTHROPLASTY SHOULDER  2/26/2013    Procedure: ARTHROPLASTY SHOULDER;  Left Total Shoulder Arthropasty;  Surgeon: Xu Snell MD;  Location: [facility]     BACK SURGERY  1992    l5-s1 decompression & fusion w/instermintation     BREAST SURGERY  1990    benigen lump rmvd l brest     CARDIAC SURGERY  2000     CHOLECYSTECTOMY, LAPOROSCOPIC  3/2007    Cholecystectomy, Laparoscopic     COLONOSCOPY  8/2009    Benign polyp     COLONOSCOPY N/A 3/25/2015    Procedure: COLONOSCOPY;  Surgeon: Deejay Marie MD;  Location: [facility]     IMPLANT COCHLEA WITH NERVE INTEGRITY MONITOR Right 12/17/2015    Procedure: IMPLANT COCHLEA WITH NERVE INTEGRITY MONITOR;  Surgeon: Mickey Turpin MD;  Location: [facility]     IMPLANT COCHLEA WITH NERVE INTEGRITY MONITOR Left 3/27/2017    Procedure: IMPLANT COCHLEA WITH NERVE INTEGRITY MONITOR;  Surgeon: Mickey Turpin MD;  Location: [facility]     PHACOEMULSIFICATION WITH STANDARD INTRAOCULAR LENS IMPLANT  6/4/2012    Procedure:PHACOEMULSIFICATION WITH STANDARD INTRAOCULAR LENS IMPLANT; Left kelman phacoemulsification with intraocular lens implant; Surgeon:DAYDAY HILL; Location:[facility]     PHACOEMULSIFICATION WITH STANDARD INTRAOCULAR LENS IMPLANT  7/26/2012    Procedure: PHACOEMULSIFICATION WITH STANDARD INTRAOCULAR LENS IMPLANT;  Right Kelman phacoemulsification with intraocular lens implant;  Surgeon: Dayday Hill MD;  Location: [facility]     RELEASE CARPAL TUNNEL  10/23/2012    Procedure: RELEASE CARPAL TUNNEL;  Right carpal tunnel release;  Surgeon: Xu Snell MD;  Location: [facility]     RELEASE CARPAL TUNNEL  11/9/2012    Procedure: RELEASE CARPAL TUNNEL;  Left  carpal tunnel release;  Surgeon: Xu Snell MD;  Location: [facility]     REMOVE HARDWARE ARTHROPLASTY SHOULDER  5/2/15    attempted and ended up doing bacteria removal     SURGICAL HISTORY OF -   3/1985    Bilateral Inguinal Herniorrhaphy     SURGICAL HISTORY OF -   1997    Breast Lumpectomy-Left-Benign     SURGICAL HISTORY OF -   6/10/2005    Rotator Cuff Repair     SURGICAL HISTORY OF -   10/2005    L5-S1 decompression and fusion with intrumentation     SURGICAL HISTORY OF -       Right Shoulder Arthroplasty     SURGICAL HISTORY OF -       PTCA with stent RCA     SURGICAL HISTORY OF -       Coronary Artery Bypass Graft     VASCULAR SURGERY      2 stents b4 bypass       FAMILY HISTORY:  Family History   Problem Relation Age of Onset     Hypertension Mother      Cancer Father         bone cancer     Diabetes Son         2015     Asthma No family hx of              Obesity No family hx of        SOCIAL HISTORY:  Social History     Socioeconomic History     Marital status:      Spouse name: None     Number of children: None     Years of education: None     Highest education level: None   Occupational History     None   Social Needs     Financial resource strain: None     Food insecurity:     Worry: None     Inability: None     Transportation needs:     Medical: None     Non-medical: None   Tobacco Use     Smoking status: Former Smoker     Packs/day: 1.00     Years: 40.00     Pack years: 40.00     Types: Cigarettes     Start date: 1958     Last attempt to quit: 1998     Years since quittin.4     Smokeless tobacco: Never Used   Substance and Sexual Activity     Alcohol use: Yes     Alcohol/week: 0.0 oz     Comment: twice yearly     Drug use: No     Sexual activity: Never   Lifestyle     Physical activity:     Days per week: None     Minutes per session: None     Stress: None   Relationships     Social connections:     Talks on phone: None     Gets together: None     Attends  Denominational service: None     Active member of club or organization: None     Attends meetings of clubs or organizations: None     Relationship status: None     Intimate partner violence:     Fear of current or ex partner: None     Emotionally abused: None     Physically abused: None     Forced sexual activity: None   Other Topics Concern     Parent/sibling w/ CABG, MI or angioplasty before 65F 55M? 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 where he was exposed to extremely loud noises. . Has two sons. He enjoys woodworking, fishing and his grandchildren.       Review of Systems:  Skin:  Positive for bruising     Eyes:  Negative      ENT:  Positive for hearing loss;tinnitus    Respiratory:  Positive for shortness of breath;dyspnea on exertion     Cardiovascular:    Positive for;lightheadedness    Gastroenterology: Negative      Genitourinary:  Positive for urinary frequency    Musculoskeletal:  Negative      Neurologic:  Positive for numbness or tingling of feet    Psychiatric:  Negative      Heme/Lymph/Imm:  Negative      Endocrine:  Negative        Physical Exam:  Vitals: /69 (BP Location: Right arm, Patient Position: Sitting, Cuff Size: Adult Regular)   Pulse 52   Wt 105.7 kg (233 lb)   SpO2 96%   BMI 35.43 kg/m       Constitutional:  cooperative, alert and oriented, well developed, well nourished, in no acute distress        Skin:  warm and dry to the touch          Head:  normocephalic        Eyes:  sclera white        Lymph:      ENT:           Neck:           Respiratory:  normal breath sounds, clear to auscultation, normal A-P diameter, normal symmetry, normal respiratory excursion, no use of accessory muscles         Cardiac: regular rhythm;normal S1 and S2       systolic murmur;LUSB;grade 1                                                 GI:           Extremities  and Muscular Skeletal:                 Neurological:  no gross motor deficits;affect appropriate        Psych:  Alert and Oriented x 3;affect appropriate, oriented to time, person and place          CC  Donny Hua MD  [address]  [place] [id number]                    Thank you for allowing me to participate in the care of your patient.      Sincerely,     Sara Parker MD     [facility]    cc:   Dnony Hua MD  [address]  [place] [id number]

## 2019-06-07 NOTE — PROGRESS NOTES
HPI and Plan:   See dictation    No orders of the defined types were placed in this encounter.      No orders of the defined types were placed in this encounter.      Medications Discontinued During This Encounter   Medication Reason     ACETAMINOPHEN PO Stopped by Patient         Encounter Diagnoses   Name Primary?     Coronary artery disease involving native coronary artery of native heart without angina pectoris      Essential hypertension with goal blood pressure less than 140/90      Hyperlipidemia with target LDL less than 70        CURRENT MEDICATIONS:  Current Outpatient Medications   Medication Sig Dispense Refill     albuterol (PROAIR HFA, PROVENTIL HFA, VENTOLIN HFA) 108 (90 BASE) MCG/ACT inhaler Inhale 2 puffs into the lungs every 6 hours as needed for shortness of breath / dyspnea or wheezing 1 Inhaler 11     aspirin (ASA) 325 MG EC tablet Take 325 mg by mouth every other day       atenolol (TENORMIN) 50 MG tablet Take 1 tablet (50 mg) by mouth daily 90 tablet 3     atorvastatin (LIPITOR) 80 MG tablet Take 1 tablet (80 mg) by mouth daily 90 tablet 3     carbidopa-levodopa (SINEMET)  MG tablet TAKE 2 TABLETS EVERY DAY AT BEDTIME 180 tablet 0     cefadroxil (DURICEF) 500 MG capsule TAKE 1 CAPSULE TWICE DAILY 180 capsule 3     fluorouracil (EFUDEX) 5 % cream Apply topically 2 times daily 40 g 3     fluticasone (FLONASE) 50 MCG/ACT nasal spray USE ONE TO TWO SPRAYS INTO BOTH NOSTRILS ONCE DAILY 48 g 0     gabapentin (NEURONTIN) 100 MG capsule Take 1-3 capsules (100-300 mg) by mouth At Bedtime For restless legs 270 capsule 0     hydrochlorothiazide (HYDRODIURIL) 25 MG tablet Take 1 tablet (25 mg) by mouth every morning 90 tablet 3     Lactobacillus (FLORAJEN ACIDOPHILUS) CAPS Take 1 capsule by mouth daily 90 capsule 3     lactobacillus rhamnosus, GG, (CULTURELL) capsule Take 1 capsule by mouth daily 90 capsule 11     lisinopril (PRINIVIL/ZESTRIL) 40 MG tablet TAKE 1 TABLET  (40MG) EVERY DAY 90 tablet 3      nitroGLYcerin (NITROSTAT) 0.4 MG sublingual tablet Place 1 tablet (0.4 mg) under the tongue every 5 minutes as needed for chest pain May repeat x 3. If no relief, call 911. 25 tablet 3     order for DME Equipment being ordered: CPAP Cas Noriega received a [employer] AirSense 10 CPAP. Pressures were set at Auto 11 - 15 cm H2O.       traZODone (DESYREL) 100 MG tablet TAKE 1 TABLET EVERY NIGHT AT BEDTIME 90 tablet 1     ORDER FOR DME one digital automatic Home blood pressure machine ( per insurance) (Patient not taking: Reported on 12/4/2017) 1 Device 0       ALLERGIES     Allergies   Allergen Reactions     Nka [No Known Allergies]        PAST MEDICAL HISTORY:  Past Medical History:   Diagnosis Date     Actinic keratosis      Acute myocardial infarction of other inferior wall, episode of care unspecified      Arthritis 1992     CAD (coronary artery disease) 2/27/2013     COPD (chronic obstructive pulmonary disease) (H) 1994     Diverticulitis of small intestine (without mention of hemorrhage) 2003    recovered     Generalized anxiety disorder 4/28/2010     Hyperlipidemia LDL goal < 70      Hypertension goal BP (blood pressure) < 140/90      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  RLS      Restless legs syndrome (RLS)      Rotator cuff disorder 6/23/2010    H/o rotator cuff repair on right-6/05 L shoulder surgery pending-Dr. Hartley until insurance changes      S/P shoulder joint replacement 2/26/2013     Senile nuclear sclerosis 6/3/2012     Sensorineural hearing loss, bilateral 7/18/2012        PAST SURGICAL HISTORY:  Past Surgical History:   Procedure Laterality Date     ARTHROPLASTY SHOULDER  2/26/2013    Procedure: ARTHROPLASTY SHOULDER;  Left Total Shoulder Arthropasty;  Surgeon: Xu Snell MD;  Location: [facility]     BACK SURGERY  1992    l5-s1 decompression & fusion w/instermintation     BREAST SURGERY  1990    benigen lump rmvd l brest     CARDIAC SURGERY  2000     CHOLECYSTECTOMY, LAPOROSCOPIC  3/2007    Cholecystectomy, Laparoscopic     COLONOSCOPY  8/2009    Benign polyp     COLONOSCOPY N/A 3/25/2015    Procedure: COLONOSCOPY;  Surgeon: Deejay Marie MD;  Location: [facility]     IMPLANT COCHLEA WITH NERVE INTEGRITY MONITOR Right 12/17/2015    Procedure: IMPLANT COCHLEA WITH NERVE INTEGRITY MONITOR;  Surgeon: Mickey Turpin MD;  [facility]     IMPLANT COCHLEA WITH NERVE INTEGRITY MONITOR Left 3/27/2017    Procedure: IMPLANT COCHLEA WITH NERVE INTEGRITY MONITOR;  Surgeon: Mickey Turpin MD;  [facility]: [facility]     PHACOEMULSIFICATION WITH STANDARD INTRAOCULAR LENS IMPLANT  6/4/2012    Procedure:PHACOEMULSIFICATION WITH STANDARD INTRAOCULAR LENS IMPLANT; Left kelman phacoemulsification with intraocular lens implant; Surgeon:DAYDAY HILL; Location:[facility]     PHACOEMULSIFICATION WITH STANDARD INTRAOCULAR LENS IMPLANT  7/26/2012    Procedure: PHACOEMULSIFICATION WITH STANDARD INTRAOCULAR LENS IMPLANT;  Right Kelman phacoemulsification with intraocular lens implant;  Surgeon: Dayday Hill MD;  Location: [facility]     RELEASE CARPAL TUNNEL  10/23/2012    Procedure: RELEASE CARPAL TUNNEL;  Right carpal tunnel release;  Surgeon: Xu Snell MD;  Location: [facility]     RELEASE CARPAL TUNNEL  11/9/2012    Procedure: RELEASE CARPAL TUNNEL;  Left carpal tunnel release;  Surgeon: Xu Snell MD;  Location: [facility]     REMOVE HARDWARE ARTHROPLASTY SHOULDER  5/2/15    attempted and ended up doing bacteria removal     SURGICAL HISTORY OF -   3/1985    Bilateral Inguinal  Herniorrhaphy     SURGICAL HISTORY OF -   1997    Breast Lumpectomy-Left-Benign     SURGICAL HISTORY OF -   6/10/2005    Rotator Cuff Repair     SURGICAL HISTORY OF -   10/2005    L5-S1 decompression and fusion with intrumentation     SURGICAL HISTORY OF -       Right Shoulder Arthroplasty     SURGICAL HISTORY OF -       PTCA with stent RCA     SURGICAL HISTORY OF -       Coronary Artery Bypass Graft     VASCULAR SURGERY      2 stents b4 bypass       FAMILY HISTORY:  Family History   Problem Relation Age of Onset     Hypertension Mother      Cancer Father         bone cancer     Diabetes Son         2015     Asthma No family hx of              Obesity No family hx of        SOCIAL HISTORY:  Social History     Socioeconomic History     Marital status:      Spouse name: None     Number of children: None     Years of education: None     Highest education level: None   Occupational History     None   Social Needs     Financial resource strain: None     Food insecurity:     Worry: None     Inability: None     Transportation needs:     Medical: None     Non-medical: None   Tobacco Use     Smoking status: Former Smoker     Packs/day: 1.00     Years: 40.00     Pack years: 40.00     Types: Cigarettes     Start date: 1958     Last attempt to quit: 1998     Years since quittin.4     Smokeless tobacco: Never Used   Substance and Sexual Activity     Alcohol use: Yes     Alcohol/week: 0.0 oz     Comment: twice yearly     Drug use: No     Sexual activity: Never   Lifestyle     Physical activity:     Days per week: None     Minutes per session: None     Stress: None   Relationships     Social connections:     Talks on phone: None     Gets together: None     Attends Oriental orthodox service: None     Active member of club or organization: None     Attends meetings of clubs or organizations: None     Relationship status: None     Intimate partner violence:     Fear of current or ex partner: None      Emotionally abused: None     Physically abused: None     Forced sexual activity: None   Other Topics Concern     Parent/sibling w/ CABG, MI or angioplasty before 65F 55M? 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 . Has two sons. He enjoys woodworking, fishing and his grandchildren.       Review of Systems:  Skin:  Positive for bruising     Eyes:  Negative      ENT:  Positive for hearing loss;tinnitus    Respiratory:  Positive for shortness of breath;dyspnea on exertion     Cardiovascular:    Positive for;lightheadedness    Gastroenterology: Negative      Genitourinary:  Positive for urinary frequency    Musculoskeletal:  Negative      Neurologic:  Positive for numbness or tingling of feet    Psychiatric:  Negative      Heme/Lymph/Imm:  Negative      Endocrine:  Negative        Physical Exam:  Vitals: /69 (BP Location: Right arm, Patient Position: Sitting, Cuff Size: Adult Regular)   Pulse 52   Wt 105.7 kg (233 lb)   SpO2 96%   BMI 35.43 kg/m      Constitutional:  cooperative, alert and oriented, well developed, well nourished, in no acute distress        Skin:  warm and dry to the touch          Head:  normocephalic        Eyes:  sclera white        Lymph:      ENT:           Neck:           Respiratory:  normal breath sounds, clear to auscultation, normal A-P diameter, normal symmetry, normal respiratory excursion, no use of accessory muscles         Cardiac: regular rhythm;normal S1 and S2       systolic murmur;LUSB;grade 1                                                 GI:           Extremities and Muscular Skeletal:                 Neurological:  no gross motor deficits;affect appropriate        Psych:  Alert and Oriented x 3;affect appropriate, oriented to time, person and place          GUILLERMO Hua,  MD  [address]  [place] [id number]

## 2019-06-10 NOTE — PROGRESS NOTES
"Service Date: 06/07/2019      CARDIOLOGY CONSULTATION      PATIENT HISTORY:  Mr. Cas Noriega is 73 years old and was seen in followup at [facility] in [place], [place].  This is my first visit with Mr. Noriega, who was previously followed by my now retired partner, Dr. Donny Hua.  Dr. Hua followed Mr. Noriega for a history of ischemic heart disease, prior inferior myocardial infarct and history of percutaneous coronary intervention, as well as subsequent coronary artery bypass graft surgery for multivessel disease.      Dr. Hua notes that \"minimal\" records have been available of Mr. Noriega's care through the [employer] system, specifically at [facility].  I now see in the Epic chart that there is an admission with the cardiac surgeon at [facility], Dr. Gonsales, but I cannot access that record as Mr. Noriega has currently left.  Permission will be obtained.      Apparently since that bypass surgery in 2007, Mr. Noriega has done very well.  He denies any chest discomfort.  He has been feeling well, but when questioned as to whether he has noted any changes, he believes he is somewhat more short of breath on the stairs of his split-level home and after walking in from the parking lot and climbing to the top of the stairs, he thought he was somewhat more short of breath at the top of the stairs than he has been in past years.  He thinks that his sherman spent in [place] doing minimal activity may be partially responsible and he was somewhat uncertain if his exertional tolerance has been improving since he returned to [place] or not.  His last answer was that he was not certain that his dyspnea with very significant exertion has actually been improving.      He does have a history of dyslipidemia, hypertension, sleep apnea and a prior history of tobacco use.  He is not limited in any of his activities by shortness of breath and as noted, he does not have any " "exertional limitation.  He has undergone bilateral cochlear implants and is very pleased with the result, noting that he can hear \"80%\" of what is said.      His last stress nuclear study was in 2017 and that demonstrated mid to basilar inferior/inferolateral scar without evidence of ischemia.  It was reported that there was mild inferior wall hypokinesis and low-normal left ventricular systolic performance with an ejection fraction of 51%.  Echocardiography done in 2018 indicated completely normal left ventricular systolic performance without regional wall motion abnormalities or significant valvular disease.      Mr. Noriega denies any palpitations and admits that he is feeling overall very well.  He does use an albuterol inhaler and Flonase nasal spray.      His lipids from last month indicated an LDL of 72, HDL 42 mg/dL and normal triglycerides.  His sodium at that time was 139, potassium 4 and he had normal renal function with a GFR of 85.  He has a hemoglobin of 16.3, normal platelets and a normal white blood cell count.  His hemoglobin A1c was 5.8.  He continues on atenolol 50 mg, aspirin, which he takes at 325 mg every other day, atorvastatin 80 mg daily and he has lisinopril prescribed.  He continues on a low dose of hydrochlorothiazide and he has nitroglycerin prescribed.      PHYSICAL EXAMINATION:   GENERAL:  This is a man in no apparent distress.  He was alert and oriented to person, place and time.   VITAL SIGNS:  Blood pressure was 131/69 mmHg, heart rate 52 beats per minute and regular, and the respiratory rate was 14-18 per minute.   CHEST:  Revealed a mild and diffuse decrease in breath sounds but without crackles or wheezes and breath sounds were heard throughout all lung fields.   CARDIAC:  On cardiac auscultation, there was an S1 and S2 without extra sounds or murmur.  The rhythm was regular.      ASSESSMENT RECOMMENDATIONS:  With regard to his mild change in breathing with significant exertion, " I have recommended a stress study.  I recommended a Lexiscan stress nuclear study, but Mr. Noriega notes that with his last stress nuclear study, his [employer] insurance did not cover the full cost of the stress study.  He actually contested his bill and Shushan told him that they discounted the cost of the tracers and Lexiscan by 50%, but he does not want to pay.  I have recommended a stress echocardiogram, which will serve several purposes, as long as Mr. Noriega is able to exercise on a modified protocol.  I will do this on beta blockade.  This will allow an assessment for chronotropic incompetence, valvular disease and ischemia on his medications.  He does have some hip problems and so I would recommend a modified protocol, not a Joaquin protocol, and it should be symptom-limited and not necessarily heart rate-limited.      Unless there is evidence of ischemia or other abnormality on the stress study, I would see Mr. Noriega back at 1 year.  He is doing very well and is on appropriate medical therapy with good control of his risk factors including his lipids and his blood pressure.      If the stress study does not demonstrate an obvious etiology for his increased albeit mild dyspnea on exertion, then followup with Sera Garcia would be recommended.  He may require some adjustment of his bronchodilator therapy.         LEONARDO STRATTON MD          D: 2019   T: 06/10/2019   MT: al      Name:     CHARLES NORIEGA   MRN:      [id number]        Account:      [id number]   :      1945           Service Date: 2019      Document: [id number]

## 2019-06-18 ENCOUNTER — HOSPITAL ENCOUNTER (OUTPATIENT)
Dept: CARDIOLOGY | Facility: CLINIC | Age: 74
Discharge: HOME | End: 2019-06-18
Attending: INTERNAL MEDICINE | Admitting: INTERNAL MEDICINE
Payer: COMMERCIAL

## 2019-06-18 ENCOUNTER — TELEPHONE (OUTPATIENT)
Dept: CARDIOLOGY | Facility: CLINIC | Age: 74
End: 2019-06-18

## 2019-06-18 DIAGNOSIS — I25.10 CORONARY ARTERY DISEASE INVOLVING NATIVE CORONARY ARTERY OF NATIVE HEART WITHOUT ANGINA PECTORIS: ICD-10-CM

## 2019-06-18 PROCEDURE — 25500064 ZZH RX 255 OP 636: Performed by: INTERNAL MEDICINE

## 2019-06-18 PROCEDURE — 93321 DOPPLER ECHO F-UP/LMTD STD: CPT | Mod: 26 | Performed by: INTERNAL MEDICINE

## 2019-06-18 PROCEDURE — 93016 CV STRESS TEST SUPVJ ONLY: CPT | Performed by: INTERNAL MEDICINE

## 2019-06-18 PROCEDURE — 93350 STRESS TTE ONLY: CPT | Mod: 26 | Performed by: INTERNAL MEDICINE

## 2019-06-18 PROCEDURE — 93325 DOPPLER ECHO COLOR FLOW MAPG: CPT | Mod: 26 | Performed by: INTERNAL MEDICINE

## 2019-06-18 PROCEDURE — 93018 CV STRESS TEST I&R ONLY: CPT | Performed by: INTERNAL MEDICINE

## 2019-06-18 PROCEDURE — 40000264 ECHO STRESS ECHOCARDIOGRAM

## 2019-06-18 RX ADMIN — HUMAN ALBUMIN MICROSPHERES AND PERFLUTREN 2 ML: 10; .22 INJECTION, SOLUTION INTRAVENOUS at 09:42

## 2019-06-18 NOTE — TELEPHONE ENCOUNTER
Result Notes for Exercise Stress Echocardiogram     Notes recorded by Teresa Shay RN on 2019 at 11:34 AM CDT  Stress echo (EKG and imaging) negative for ischemia. Sensitivity of study significantly reduced as pt unable to reach target HR noted. Will have Dr. Parker review/advise.   Exercise Stress Echocardiogram   Order: [id number]   Status:  Final result   Visible to patient:  No (Not Released) Dx:  Coronary artery disease involving jason...   Details     Reading Physician Reading Date Result Priority   Zayra Hayward MD 2019       Narrative     [id number]  [id number]  [id number]  [id number]^VIRAL^LEONARDO^HORACE           [facility]  Echocardiography Laboratory  [address] [place] [address]        Name: CHARLES SANCHEZ  MRN: [id number]  : 1945  Study Date: 2019 08:58 AM  Age: 73 yrs  Gender: Male  Patient Location: [facility]  Reason For Study: Coronary artery disease involving native coronary artery of  benton  Ordering Physician: LEONARDO PARKER  Referring Physician: LEONARDO PARKER  Performed By: Mary Cedeno     BSA: 2.2 m2  Height: 68 in  Weight: 233 lb  HR: 50  BP: 130/70 mmHg  Medications: Atenolol,Lipitor,HCTZ,ASA,prinivil  _____________________________________________________________________________  __        Procedure  Stress Echo Complete. Contrast Optison.  _____________________________________________________________________________  __        Interpretation Summary  1. Reduced functional capacity  2. Negative Stress EKG for ischemia  3. Negative Stress Echocardiogram for ischemia (at level of exercise imaged)  4. Sensitivity of study is significantly reduced as patient unable to reach  target HR.  _____________________________________________________________________________  __     Stress  The patient exercised 3:51.  Exercise was stopped due to fatigue.  The patient did not exhibit any symptoms during exercise.  There was a normal BP response to exercise.  A  treadmill exercise test according to the Joaquin protocol was performed.  Target Heart Rate was not achieved due to fatigue.  The Duke treadmill score was intermediate risk ( -11< Jay score <5).  The EKG portion of this stress test was negative for inducible ischemia (see  echo results below).  This was a normal stress echocardiogram with no evidence of stress-induced  ischemia.  Normal left ventricular function and wall motion at rest and post-stress.     Baseline  The patient is in normal sinus rhythm.  The visual ejection fraction is estimated at 60-65%.  No regional wall motion abnormalities noted.     Stress Results           Protocol:  Joaquin ramp        Maximum Predicted HR:   145 bpm           Target HR: 123 bpm           % Maximum Predicted HR: 62 %              Duration  Heart    Stage   (mm:ss)   Rate     BP                    Comment                     (bpm)   Stage 1   3:00      79    140/72   Stage 2   0:51      90    170/80RPP: 15,300; FAC: Below Average; Duke Score:                                   3  RecoveryR  6:00      51    146/80                Stress Duration:   3:51 mm:ss        Recovery Time: 6:00 mm:ss          Maximum Stress HR: 90 bpm            METS:          4     _____________________________________________________________________________  __  MMode/2D Measurements & Calculations  IVSd: 1.3 cm  LVIDd: 6.2 cm  LVIDs: 4.5 cm  LVPWd: 0.87 cm  FS: 27.9 %  LV mass(C)dI: 129.1 grams/m2  asc Aorta Diam: 4.0 cm  RWT: 0.28           Doppler Measurements & Calculations  TR max humberto: 231.2 cm/sec  TR max P.5 mmHg           _____________________________________________________________________________  __           Report approved by: RIANNA Huntley 2019 11:16 AM

## 2019-06-19 NOTE — TELEPHONE ENCOUNTER
Looks OK - he is limited by his hip.  His insurance did not pay for Lexiscan in the past.  Thank you. CD    ADDENDUM: Pt prefers email due to hearing loss. [employer] message sent with results and recommendation. Zee Cosby RN Cardiology June 20, 2019, 8:28 AM

## 2019-06-20 NOTE — RESULT ENCOUNTER NOTE
"Per Dr Parker: \"Looks OK - he is limited by his hip.  His insurance did not pay for Lexiscan in the past.  Thank you. CD\". Pt prefers email due to hearing loss. [employer] message sent with results and recommendation."

## 2019-07-05 ENCOUNTER — ALLIED HEALTH/NURSE VISIT (OUTPATIENT)
Dept: FAMILY MEDICINE | Facility: CLINIC | Age: 74
End: 2019-07-05
Payer: COMMERCIAL

## 2019-07-05 DIAGNOSIS — G25.81 RESTLESS LEGS SYNDROME (RLS): Primary | ICD-10-CM

## 2019-07-05 DIAGNOSIS — G25.81 RESTLESS LEGS SYNDROME (RLS): ICD-10-CM

## 2019-07-05 PROCEDURE — 99207 ZZC NO CHARGE NURSE ONLY: CPT

## 2019-07-05 NOTE — TELEPHONE ENCOUNTER
Needs refill of Gabapentin, can't get in to see sleep doctor until end of July then will come back to see you. Okay to wait until Monday

## 2019-07-08 DIAGNOSIS — G47.00 INSOMNIA, UNSPECIFIED TYPE: ICD-10-CM

## 2019-07-08 DIAGNOSIS — R09.81 CHRONIC NASAL CONGESTION: ICD-10-CM

## 2019-07-08 RX ORDER — TRAZODONE HYDROCHLORIDE 100 MG/1
TABLET ORAL
Qty: 90 TABLET | Refills: 1 | Status: SHIPPED | OUTPATIENT
Start: 2019-07-08 | End: 2020-02-17

## 2019-07-08 RX ORDER — GABAPENTIN 100 MG/1
100-300 CAPSULE ORAL AT BEDTIME
Qty: 270 CAPSULE | Refills: 0 | Status: SHIPPED | OUTPATIENT
Start: 2019-07-08 | End: 2019-10-22

## 2019-07-08 RX ORDER — FLUTICASONE PROPIONATE 50 MCG
SPRAY, SUSPENSION (ML) NASAL
Qty: 48 G | Refills: 0 | Status: SHIPPED | OUTPATIENT
Start: 2019-07-08

## 2019-07-26 ENCOUNTER — OFFICE VISIT (OUTPATIENT)
Dept: SLEEP MEDICINE | Facility: CLINIC | Age: 74
End: 2019-07-26
Payer: COMMERCIAL

## 2019-07-26 VITALS
SYSTOLIC BLOOD PRESSURE: 120 MMHG | WEIGHT: 232 LBS | OXYGEN SATURATION: 97 % | BODY MASS INDEX: 35.16 KG/M2 | HEART RATE: 52 BPM | DIASTOLIC BLOOD PRESSURE: 70 MMHG | HEIGHT: 68 IN

## 2019-07-26 DIAGNOSIS — G47.33 OSA (OBSTRUCTIVE SLEEP APNEA): Primary | ICD-10-CM

## 2019-07-26 PROCEDURE — 99214 OFFICE O/P EST MOD 30 MIN: CPT | Performed by: FAMILY MEDICINE

## 2019-07-26 ASSESSMENT — MIFFLIN-ST. JEOR: SCORE: 1761.85

## 2019-07-26 NOTE — PROGRESS NOTES
Obstructive Sleep Apnea - PAP Follow-Up Visit:    Chief Complaint   Patient presents with     CPAP Follow Up     Pt states that his Cpap is working well. No sleep concerns. His second Cpap shuts off occasionally at night.       Cas Noriega comes in today for follow-up of their severe obstructive sleep apnea, managed with CPAP.     Pertinent PMHx of bilateral sensorineural healing loss s/p placement of R cochlear implant, obesity, CAD, RLS.    Overall, he feels his CPAP is working well and no concerns with his sleep.  We did review his RLS history and has historically been treated with sinemet  x2 PO at bedtime (for likely over 6 years).  But, was recently started on gabapentin at bedtime due to some residual leg movements during sleep.  Gabapentin titrated 100 -> 200 -> 300mg and the 300mg dose appears effective.  He denies any classical RLS symptoms, but the increased leg movement in sleep discovered after he started to share the bed space with his wife.    CPAP download from 2019 - 2019 on auto-titrate 11-15 cm H2O.  Used 30/30 days, average usage 8 hours 41 minutes.  Pressure median 12.3 cm H2O, 95th%ile 13.7 cm H2O.  AHI 0.5.    Prior Sleep Testin2005 - PSG with AHI ~45, titration PSG on 2015 with CPAP 11 optimal and high PLM index of ~90/hour      Problem List:  Patient Active Problem List    Diagnosis Date Noted     Obesity (BMI 35.0-39.9) with comorbidity (H) 2019     Priority: Medium     Prediabetes 2019     Priority: Medium     Encounter for long-term (current) use of antibiotics 2016     Priority: Medium     Life long for shoulder joint infection post op-duricef       Cochlear implant in place 2015     Priority: Medium     Cochlear implant placed on 12/17/15 by Dr. Dominguez Turpin[id number]    Cochlear implant placed on 3/27/17 by Dr. Dominguez Turpin: Left, [facility], [facility], #[id number].       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       SEVERE MARTY (obstructive sleep apnea) 01/07/2015     Priority: Medium     [facility]na [facility] 8/30/2005 AHI 46.8 Wes 80% CPAP       CAD (coronary artery disease) 02/27/2013     Priority: Medium     NM Lexiscan 8/10/2015: Small apical ischemia. Basal inferior nontransmural infarct with mild geoffrey-infarct ischemia. EF 50% with stress.    Cardiac echocardiogram 5/7/2014: Preserved LV systolic function, estimated EF 60-65%. No WMA. Trace MR. Trace to mild TR.    History of stent and CABG 2007.  Saw sleep specialist April 2017, severe MARTY felt stable, with plan of recheck in 2 yrs.  No chest pains, chest pressures, SOB or sudden change of endurance.   Notes some panting if doing >1 flight of stairs, but this is not a change.         S/P shoulder joint replacement 02/26/2013     Priority: Medium     Sensorineural hearing loss, bilateral 07/18/2012     Priority: Medium     Senile nuclear sclerosis 06/03/2012     Priority: Medium     Advance Care Planning 05/21/2012     Priority: Medium     Advance Care Planning 11/25/2015: Receipt of ACP document:  Received: Health Care Directive which was witnessed or notarized on 12-19-12.  Document previously scanned on 11-10-15.  Validation form completed and sent to be scanned.  Code Status reflects choices in most recent ACP document.  Confirmed/documented designated decision maker(s).  Phone number needed for alternate health care agent.  Added by NORY HUGGINS  Discussed advance care planning with patient; information given to patient to review. 5/21/2012          Intermittent asthma 12/20/2010     Priority: Medium     Rotator cuff disorder 06/23/2010     Priority: Medium     H/o rotator cuff repair on right-6/05  L shoulder surgery pending-Dr. Hartley until insurance changes       Generalized anxiety disorder 04/28/2010     Priority: Medium     Mild major depression (H)  "04/28/2010     Priority: Medium     Essential hypertension      Priority: Medium     Acute myocardial infarction of other inferior wall, episode of care unspecified      Priority: Medium     2007 and 2008. He then had an inferior wall myocardial infarction and was taken emergently to [facility] were her received 2 coronary stents. He was then scheduled for bypass surgery. Underwent multi-bypass surgery with unfortunately no old records available.       Hyperlipidemia with target LDL less than 70      Priority: Medium     Diagnosis updated by automated process. Provider to review and confirm.       Restless legs syndrome (RLS)      Priority: Medium     Insomnia      Priority: Medium     Lumbago      Priority: Medium     decrease feeling in r foot,,epidural by spinal surgeon, no improvement,-2nd and third and fourth, stand long periods of time, shower, numb in left hip/upper thigh, gets back ache if stands too long  Recommended medication, pt did not want to use, recommended additional surgery, pt wants to hold            /70   Pulse 52   Ht 1.727 m (5' 8\")   Wt 105.2 kg (232 lb)   SpO2 97%   BMI 35.28 kg/m      Impression/Plan:    1.)  Severe MARTY (8/30/2005 with AHI ~45, titration PSG on 1/20/2015 with CPAP 11 optimal and high PLM index of ~90/hour) currently treated with auto-titrate CPAP 11-15 cm H2O   - Appears adequately treated per download   - Continue CPAP at current settings      2.)   PLMD   - Seen to have high # of PLM's on PSG 1/20/2015   - Does not appear to report classical RLS symptoms.   - Previously, well controlled with sinemet  x2 PO at bedtime, but seems as if some increase in PLM's and was recently started on gabapentin.  Leg movements have resolved with increase to 300mg PO at bedtime.  Some question of possible augmentation.   - Continue gabapentin 300mg PO QHS.   - Will start taper of sinemet 25-100mg to x1 PO at bedtime for 2 weeks, and if stable, " discontinue.     Cardozo T Hari will follow up in about 2 year(s).     Twenty-five minutes spent with patient, all of which were spent face-to-face counseling, consulting, coordinating plan of care.      Bryce Recinos MD, MD    CC:  Sera Garcia

## 2019-07-26 NOTE — PATIENT INSTRUCTIONS
It was a pleasure to see you today.  I am glad to hear the legs at night with the gabapentin, so I feel we can decrease and possibly stop the carbidopa-levodopa medicine.  Today, I would reduce to taking just one tablet of the carbidopa-levodopa at night for two weeks.  If no concerns from your wife about your legs, then I would stop the carbidopa-levodopa.      Your Body mass index is 35.28 kg/m .  Weight management is a personal decision.  If you are interested in exploring weight loss strategies, the following discussion covers the approaches that may be successful. Body mass index (BMI) is one way to tell whether you are at a healthy weight, overweight, or obese. It measures your weight in relation to your height.  A BMI of 18.5 to 24.9 is in the healthy range. A person with a BMI of 25 to 29.9 is considered overweight, and someone with a BMI of 30 or greater is considered obese. More than two-thirds of American adults are considered overweight or obese.  Being overweight or obese increases the risk for further weight gain. Excess weight may lead to heart disease and diabetes.  Creating and following plans for healthy eating and physical activity may help you improve your health.  Weight control is part of healthy lifestyle and includes exercise, emotional health, and healthy eating habits. Careful eating habits lifelong are the mainstay of weight control. Though there are significant health benefits from weight loss, long-term weight loss with diet alone may be very difficult to achieve- studies show long-term success with dietary management in less than 10% of people. Attaining a healthy weight may be especially difficult to achieve in those with severe obesity. In some cases, medications, devices and surgical management might be considered.  What can you do?  If you are overweight or obese and are interested in methods for weight loss, you should discuss this with your provider.     Consider reducing daily  calorie intake by 500 calories.     Keep a food journal.     Avoiding skipping meals, consider cutting portions instead.    Diet combined with exercise helps maintain muscle while optimizing fat loss. Strength training is particularly important for building and maintaining muscle mass. Exercise helps reduce stress, increase energy, and improves fitness. Increasing exercise without diet control, however, may not burn enough calories to loose weight.       Start walking three days a week 10-20 minutes at a time    Work towards walking thirty minutes five days a week     Eventually, increase the speed of your walking for 1-2 minutes at time    In addition, we recommend that you review healthy lifestyles and methods for weight loss available through the National Institutes of Health patient information sites:  http://win.niddk.nih.gov/publications/index.htm    And look into health and wellness programs that may be available through your health insurance provider, employer, local community center, or [employer].    Weight management plan: Discussed healthy diet and exercise guidelines

## 2019-09-06 ENCOUNTER — TELEPHONE (OUTPATIENT)
Dept: AUDIOLOGY | Facility: CLINIC | Age: 74
End: 2019-09-06

## 2019-09-06 ENCOUNTER — OFFICE VISIT (OUTPATIENT)
Dept: FAMILY MEDICINE | Facility: CLINIC | Age: 74
End: 2019-09-06
Payer: COMMERCIAL

## 2019-09-06 VITALS
HEIGHT: 68 IN | OXYGEN SATURATION: 97 % | TEMPERATURE: 98.2 F | RESPIRATION RATE: 14 BRPM | DIASTOLIC BLOOD PRESSURE: 73 MMHG | HEART RATE: 49 BPM | WEIGHT: 238 LBS | BODY MASS INDEX: 36.07 KG/M2 | SYSTOLIC BLOOD PRESSURE: 127 MMHG

## 2019-09-06 DIAGNOSIS — R06.09 DOE (DYSPNEA ON EXERTION): ICD-10-CM

## 2019-09-06 DIAGNOSIS — G47.61 PLMD (PERIODIC LIMB MOVEMENT DISORDER): Primary | ICD-10-CM

## 2019-09-06 DIAGNOSIS — Z23 NEED FOR PROPHYLACTIC VACCINATION AND INOCULATION AGAINST INFLUENZA: ICD-10-CM

## 2019-09-06 PROCEDURE — G0008 ADMIN INFLUENZA VIRUS VAC: HCPCS | Performed by: PHYSICIAN ASSISTANT

## 2019-09-06 PROCEDURE — 99214 OFFICE O/P EST MOD 30 MIN: CPT | Mod: 25 | Performed by: PHYSICIAN ASSISTANT

## 2019-09-06 PROCEDURE — 90662 IIV NO PRSV INCREASED AG IM: CPT | Performed by: PHYSICIAN ASSISTANT

## 2019-09-06 ASSESSMENT — MIFFLIN-ST. JEOR: SCORE: 1784.06

## 2019-09-06 NOTE — PROGRESS NOTES
"Subjective     Cas Noriega is a 74 year old male who presents to clinic today for the following health issues:    HPI     * Follow-up:  Was told back in May that after he goes to see the cardiologist and have a sleep study to follow up.  Sleep study wasn't available until 7/28.      * Was taken off of carbidopa and put on Gabapentin.  Was going well until the last week or so and hasn't been able to sleep well.  Wondering if it needs to be increased.  Finds himself moving legs a lot and laying awake due to it.  No side effects with gabapentin so far.    Saw cardiology.  Had mild increased MENESES there, no cardiac concerns found.  Was to see me for possible change in his bronchodilator therapy, but he feels there is no issue.  He occasionally sits after going up stairs quickly but notes he does not have to sit, and can walk across entire [facility] without any symptoms.  He does not use his albuterol for his intermittent asthma.     BP Readings from Last 3 Encounters:   09/06/19 127/73   07/26/19 120/70   06/07/19 131/69    Wt Readings from Last 3 Encounters:   09/06/19 108 kg (238 lb)   07/26/19 105.2 kg (232 lb)   06/07/19 105.7 kg (233 lb)         Reviewed and updated as needed this visit by Provider  Tobacco  Allergies  Meds  Problems  Med Hx  Surg Hx  Fam Hx         Review of Systems   ROS COMP: Constitutional, cardiovascular, pulmonary, musculoskeletal, and psych systems are negative, except as otherwise noted.      Objective    /73 (BP Location: Right arm, Patient Position: Chair, Cuff Size: Adult Large)   Pulse (!) 49   Temp 98.2  F (36.8  C) (Oral)   Resp 14   Ht 1.727 m (5' 8\")   Wt 108 kg (238 lb)   SpO2 97%   BMI 36.19 kg/m    Body mass index is 36.19 kg/m .  Physical Exam   GENERAL: healthy, alert and no distress  RESP: lungs clear to auscultation - no rales, rhonchi or wheezes  CV: regular rate and rhythm, normal S1 S2, no S3 or S4, no murmur, click or rub, no " peripheral edema and peripheral pulses strong  PSYCH: mentation appears normal, affect normal/bright          Assessment & Plan       ICD-10-CM    1. PLMD (periodic limb movement disorder) G47.61    2. MENESES (dyspnea on exertion) R06.09    3. Need for prophylactic vaccination and inoculation against influenza Z23 FLU VACCINE, INCREASED ANTIGEN, PRESV FREE, AGE 65+ [17014]     ADMIN INFLUENZA (For MEDICARE Patients ONLY) []     Somewhat contradictory on how symptomatic MENESES is, but does not want to do anything about it at this time, and not using his albuterol.  Patient Instructions   Try increasing gabapentin to 4 pills per night.  Can go to 5 pills per night if necessary.  Make sure isn't making you too tired in the morning or dizzy.  Let me know what works so I can send next prescription.            Return in about 8 months (around 5/6/2020) for Routine Visit.    Sera Garcia PA-C  [facility]

## 2019-09-06 NOTE — NURSING NOTE
"Chief Complaint   Patient presents with     RECHECK       Initial /73 (BP Location: Right arm, Patient Position: Chair, Cuff Size: Adult Large)   Pulse (!) 49   Temp 98.2  F (36.8  C) (Oral)   Resp 14   Ht 1.727 m (5' 8\")   Wt 108 kg (238 lb)   SpO2 97%   BMI 36.19 kg/m   Estimated body mass index is 36.19 kg/m  as calculated from the following:    Height as of this encounter: 1.727 m (5' 8\").    Weight as of this encounter: 108 kg (238 lb).    Patient presents to the clinic using No DME    Health Maintenance that is potentially due pending provider review:  NONE        Is there anyone who you would like to be able to receive your results? No  If yes have patient fill out JACE      "

## 2019-09-06 NOTE — TELEPHONE ENCOUNTER
[facility] Call Center    Phone Message    May a detailed message be left on voicemail: yes    Reason for Call: Other: Patients wife calling stating patient lost his right cochlear implant and can not hear, Patient is scheduled for 10/1/19 which is the next available and placed on a wait list but patients wife if requesting a return call to discuss the situation. please call thank you.      Action Taken: Message routed to:  [facility] ([facility] Audiology

## 2019-09-06 NOTE — PATIENT INSTRUCTIONS
Try increasing gabapentin to 4 pills per night.  Can go to 5 pills per night if necessary.  Make sure isn't making you too tired in the morning or dizzy.  Let me know what works so I can send next prescription.

## 2019-09-08 ENCOUNTER — MYC MEDICAL ADVICE (OUTPATIENT)
Dept: FAMILY MEDICINE | Facility: CLINIC | Age: 74
End: 2019-09-08

## 2019-09-09 NOTE — TELEPHONE ENCOUNTER
Returned call to patient's wife Yojana.  Cas lost the right cochlear implant sound processor.  He is wearing his backup processor but the older batteries have a shorter battery life than the battery that was lost with the processor.  Patient/wife will contact [employer] to check status of loss warranty.  If out of warranty, Yojana reports they may simply replace the batteries for the right CI rather than ordering a replacement device, since the lost one is not yet 5 years old.      Patient's left cochlear implant has been malfunctioning as well, which can be discussed with [employer] for troubleshooting support.     Patient will keep the 10/1/19 appointment for the time being and will call the clinic with an update on whether resolution of issues is achieved after talking with [employer] or if we should still meet on 10/1/19.  My direct call back #[phone] was provided.    Yojana expressed understanding and agreement with this plan.      Isidro Fink, CCC-A  Licensed Audiologist  [place] #[id number]

## 2019-09-11 ENCOUNTER — MYC MEDICAL ADVICE (OUTPATIENT)
Dept: SLEEP MEDICINE | Facility: CLINIC | Age: 74
End: 2019-09-11

## 2019-09-11 NOTE — TELEPHONE ENCOUNTER
"MTM response to question on this --  Surinder Zamora,     Thanks for reaching out.  The 2015 guidelines do advise that antibiotic use can be considered in \"patients with a history of complications associated with their joint replacement surgery who are undergoing dental procedures that include gingival manipulation or mucosal incision, prophylactic antibiotics should only be considered after consultation with the patient and orthopedic surgeon; in cases where antibiotics are deemed necessary, it is most appropriate that the orthopedic surgeon recommend the appropriate antibiotic regimen and, when reasonable, write the prescription.\"     Has orthopedics given any insight on this?  I'm not able to find any specific dosing recs for cefadroxil for dental prophylaxis, but treatment dosing should cover prophylaxis as well.     Naye Saunders, PharmD, BCACP   Medication Therapy Management Provider   "

## 2019-09-13 ENCOUNTER — TELEPHONE (OUTPATIENT)
Dept: FAMILY MEDICINE | Facility: CLINIC | Age: 74
End: 2019-09-13

## 2019-09-13 NOTE — TELEPHONE ENCOUNTER
Sera signed the note he brought in and it was placed at the .      [facility]  Clinic Station

## 2019-09-23 DIAGNOSIS — H90.3 SENSORINEURAL HEARING LOSS, BILATERAL: Primary | ICD-10-CM

## 2019-09-23 NOTE — TELEPHONE ENCOUNTER
Spoke to patient's wife.  He has not resolved his cochlear implant equipment issues with [employer], so he will be coming on 9/25/19 for equipment assistance.      Isidro Fink, CCC-A  Licensed Audiologist  [place] #[id number]

## 2019-09-24 NOTE — PROGRESS NOTES
AUDIOLOGY REPORT    BACKGROUND INFORMATION: Dr. Mickey Turpin implanted Cas Noriega with a right  [employer] 90k Advantage midscala MS cochlear implant (CI) on 12/17/2015 and a left HiRes 90k Ultra CI on 3/27/2017 due to bilateral severe to profound sensorineural hearing loss and lack of benefit from hearing aids. The patient is being seen for cochlear implant troubleshooting (50 minutes) on 9/25/2019 in Audiology at the [facility].  Today's visit was ordered by Edelmira Melo MD.     PATIENT REPORT: Patient reports that both of his left cochlear implant processors are malfunctioning.  Additionally, one of his right processors was lost so he tried using his backup which provides no sound.        TEST RESULTS:   Patient was wearing left Lydia CI Q90 processor [id number] (black dot) which often gives red light error.  Listening check was normal.  RMA [id number] submitted.    Backup left Lydia CI Q90 processor [id number] (green dot) sounds like static to the patient.  Listening check was normal in program 2 but only static was heard in program 1.  This did not resolve with new Tmic2.  Therefore RMA [id number] was submitted.      Right Lydia CI Q90 processor [id number] would not power on.  RMA [id number] was submitted.    Contacted [employer] to cancel two pending RMAs: [id number] and [id number].  They were submitted to [employer] by the patient over the phone prior to the visit.  They were for incorrect serial #s and incorrect ears because the patient could not read the #s off the devices.    Right Lydia CI Q90 processor [id number] (beige) was lost along with 3.5 inch beige cable, universal headpiece, beige color cap, Tmic2 (large) and 230 battery.  Patient will submit a letter to [employer] customer service to use the one time loss policy.  He is aware that he should now consider purchasing loss coverage since the existing coverage has been used.       The RMAs submitted today should arrive at patient's home in the next 1-2 days.  The lost processor may take longer to process.  Patient reports he will be traveling to [place] in the next few weeks and will spend the winter there.  Therefore, if the lost processor replacement is delayed significantly, he may have the replacement shipped to: [address] [place] [id number].  He was also shown how to search for clinics in [place] via [employer] website, in case he should need any support over the winter.      We also discussed that the patient may wish to consider getting new batteries since he is still using his original ones and battery life is significantly shorter than in the past.        SUMMARY AND RECOMMENDATIONS: RMAs were submitted for the patient's three faulty processors (two for left ear, one for right ear).  Replacements will ship to the patient's home and he will notify the clinic if there are any ongoing issues once the equipment arrives.    Patient will submit a letter to [employer] to have them process a loss claim for right processor [id number].  If this is processed by [employer] quickly, it will ship to the patient's home address.  If there are delays in processing, he provided an address for shipment to his address in [place].     Patient will contact [employer] should he wish to order new batteries.    Since the patient has not had functioning equipment bilaterally for an extended time and still does not, speech perception testing was not completed today.  Patient can return for speech perception testing with his managing audiologist Macrina Loomis at his convenience once his equipment issues are resolved.      The patient expressed understanding and agreement with this plan.      Isidro Fink, CCC-A  Licensed Audiologist  [place] #[id number]    Cc Macrina Loomis

## 2019-09-25 ENCOUNTER — OFFICE VISIT (OUTPATIENT)
Dept: AUDIOLOGY | Facility: CLINIC | Age: 74
End: 2019-09-25
Payer: COMMERCIAL

## 2019-09-25 ENCOUNTER — TELEPHONE (OUTPATIENT)
Dept: AUDIOLOGY | Facility: CLINIC | Age: 74
End: 2019-09-25

## 2019-09-25 DIAGNOSIS — H90.3 SENSORINEURAL HEARING LOSS, BILATERAL: Primary | ICD-10-CM

## 2019-09-25 NOTE — TELEPHONE ENCOUNTER
Received email from [employer] that they have processed patient's loss claim for right Lydia [id number].  RMA [id number] was completed for patient.  Replacement equipment will ship to patient's home in [place].  Patient updated via email.       Isidro Fink, CCC-A  Licensed Audiologist  [place] #[id number]

## 2019-09-26 ENCOUNTER — MYC MEDICAL ADVICE (OUTPATIENT)
Dept: FAMILY MEDICINE | Facility: CLINIC | Age: 74
End: 2019-09-26

## 2019-09-26 ENCOUNTER — APPOINTMENT (OUTPATIENT)
Dept: FAMILY MEDICINE | Facility: CLINIC | Age: 74
End: 2019-09-26
Payer: COMMERCIAL

## 2019-09-26 DIAGNOSIS — L57.0 ACTINIC KERATOSIS: ICD-10-CM

## 2019-09-26 NOTE — TELEPHONE ENCOUNTER
"Routing refill request to provider for review/approval because:  Drug not [place] [facility] [place]   Last ordered by Alex Wells PA-C      Requested Prescriptions   Pending Prescriptions Disp Refills     fluorouracil (EFUDEX) 5 % external cream 40 g 3     Sig: Apply topically 2 times daily       Efudex Protocol Failed - 9/26/2019  4:32 PM        Failed - Refills for this medication group require provider approval        Passed - Recent (12 mo) or future (30 days) visit within the authorizing provider's specialty     Patient had office visit in the last 12 months or has a visit in the next 30 days with authorizing provider or within the authorizing provider's specialty.  See \"Patient Info\" tab in inbasket, or \"Choose Columns\" in Meds & Orders section of the refill encounter.              Passed - Patient is age 18 or older        Last Written Prescription Date:  1/2/17  Last Fill Quantity: 40 g,  # refills: 3   Last office visit: 9/6/2019 with DEANN Garcia PA-C  Future Office Visit:      Imelda SEWELL RN      "

## 2019-09-27 RX ORDER — FLUOROURACIL 50 MG/G
CREAM TOPICAL 2 TIMES DAILY
Qty: 40 G | Refills: 3 | OUTPATIENT
Start: 2019-09-27

## 2019-09-27 NOTE — TELEPHONE ENCOUNTER
Call patient - Refill denied.  This medicine is not for long term use.  If has ongoing skin lesions, he needs to be seen.

## 2019-10-04 ENCOUNTER — HEALTH MAINTENANCE LETTER (OUTPATIENT)
Age: 74
End: 2019-10-04

## 2019-10-22 ENCOUNTER — MYC MEDICAL ADVICE (OUTPATIENT)
Dept: FAMILY MEDICINE | Facility: CLINIC | Age: 74
End: 2019-10-22

## 2019-10-22 DIAGNOSIS — G25.81 RESTLESS LEGS SYNDROME (RLS): ICD-10-CM

## 2019-10-23 RX ORDER — GABAPENTIN 400 MG/1
400 CAPSULE ORAL AT BEDTIME
Qty: 90 CAPSULE | Refills: 1 | Status: SHIPPED | OUTPATIENT
Start: 2019-10-23 | End: 2020-05-14

## 2019-10-30 ENCOUNTER — MYC MEDICAL ADVICE (OUTPATIENT)
Dept: FAMILY MEDICINE | Facility: CLINIC | Age: 74
End: 2019-10-30

## 2019-10-30 ENCOUNTER — MYC REFILL (OUTPATIENT)
Dept: FAMILY MEDICINE | Facility: CLINIC | Age: 74
End: 2019-10-30

## 2019-10-30 DIAGNOSIS — J45.20 INTERMITTENT ASTHMA, UNCOMPLICATED: ICD-10-CM

## 2019-10-30 RX ORDER — ALBUTEROL SULFATE 90 UG/1
2 AEROSOL, METERED RESPIRATORY (INHALATION) EVERY 6 HOURS PRN
Qty: 1 INHALER | Refills: 11 | Status: CANCELLED | OUTPATIENT
Start: 2019-10-30

## 2019-10-31 RX ORDER — ALBUTEROL SULFATE 90 UG/1
2 AEROSOL, METERED RESPIRATORY (INHALATION) EVERY 6 HOURS PRN
Qty: 3 INHALER | Refills: 0 | Status: SHIPPED | OUTPATIENT
Start: 2019-10-31 | End: 2019-11-04

## 2019-12-14 ENCOUNTER — TRANSFERRED RECORDS (OUTPATIENT)
Dept: HEALTH INFORMATION MANAGEMENT | Facility: CLINIC | Age: 74
End: 2019-12-14

## 2019-12-15 ENCOUNTER — TRANSFERRED RECORDS (OUTPATIENT)
Dept: HEALTH INFORMATION MANAGEMENT | Facility: CLINIC | Age: 74
End: 2019-12-15

## 2019-12-17 ENCOUNTER — TRANSFERRED RECORDS (OUTPATIENT)
Dept: HEALTH INFORMATION MANAGEMENT | Facility: CLINIC | Age: 74
End: 2019-12-17

## 2019-12-17 LAB — EJECTION FRACTION: NORMAL %

## 2020-01-31 ENCOUNTER — TRANSFERRED RECORDS (OUTPATIENT)
Dept: HEALTH INFORMATION MANAGEMENT | Facility: CLINIC | Age: 75
End: 2020-01-31

## 2020-02-08 ENCOUNTER — HEALTH MAINTENANCE LETTER (OUTPATIENT)
Age: 75
End: 2020-02-08

## 2020-02-14 DIAGNOSIS — G47.00 INSOMNIA, UNSPECIFIED TYPE: ICD-10-CM

## 2020-02-16 NOTE — TELEPHONE ENCOUNTER
"Requested Prescriptions   Pending Prescriptions Disp Refills     traZODone (DESYREL) 100 MG tablet [Pharmacy Med Name: TRAZODONE HYDROCHLORIDE 100 MG Tablet] 90 tablet 1     Sig: TAKE 1 TABLET AT BEDTIME       Serotonin Modulators Passed - 2/14/2020  9:01 PM        Passed - Recent (12 mo) or future (30 days) visit within the authorizing provider's specialty     Patient has had an office visit with the authorizing provider or a provider within the authorizing providers department within the previous 12 mos or has a future within next 30 days. See \"Patient Info\" tab in inbasket, or \"Choose Columns\" in Meds & Orders section of the refill encounter.              Passed - Medication is active on med list        Passed - Patient is age 18 or older        Last Written Prescription Date:  7/8/19  Last Fill Quantity: 90,  # refills: 1   Last office visit:  5/16/19  Future Office Visit:      "

## 2020-02-17 ENCOUNTER — MYC REFILL (OUTPATIENT)
Dept: FAMILY MEDICINE | Facility: CLINIC | Age: 75
End: 2020-02-17

## 2020-02-17 DIAGNOSIS — J45.20 INTERMITTENT ASTHMA, UNCOMPLICATED: ICD-10-CM

## 2020-02-17 DIAGNOSIS — G47.00 INSOMNIA, UNSPECIFIED TYPE: ICD-10-CM

## 2020-02-17 RX ORDER — TRAZODONE HYDROCHLORIDE 100 MG/1
TABLET ORAL
Qty: 90 TABLET | Refills: 0 | Status: SHIPPED | OUTPATIENT
Start: 2020-02-17 | End: 2020-04-20

## 2020-02-17 NOTE — TELEPHONE ENCOUNTER
"Requested Prescriptions   Pending Prescriptions Disp Refills     albuterol (PROAIR HFA/PROVENTIL HFA/VENTOLIN HFA) 108 (90 Base) MCG/ACT inhaler 54 g 1     Sig: Inhale 2 puffs into the lungs every 6 hours as needed for shortness of breath / dyspnea or wheezing       Asthma Maintenance Inhalers - Anticholinergics Failed - 2/17/2020 10:05 AM        Failed - Asthma control assessment score within normal limits in last 6 months     Please review ACT score.           Passed - Patient is age 12 years or older        Passed - Medication is active on med list        Passed - Recent (6 mo) or future (30 days) visit within the authorizing provider's specialty     Patient had office visit in the last 6 months or has a visit in the next 30 days with authorizing provider or within the authorizing provider's specialty.  See \"Patient Info\" tab in inbasket, or \"Choose Columns\" in Meds & Orders section of the refill encounter.            traZODone (DESYREL) 100 MG tablet 90 tablet 0     Sig: Take 1 tablet (100 mg) by mouth At Bedtime       Serotonin Modulators Passed - 2/17/2020 10:05 AM        Passed - Recent (12 mo) or future (30 days) visit within the authorizing provider's specialty     Patient has had an office visit with the authorizing provider or a provider within the authorizing providers department within the previous 12 mos or has a future within next 30 days. See \"Patient Info\" tab in inbasket, or \"Choose Columns\" in Meds & Orders section of the refill encounter.              Passed - Medication is active on med list        Passed - Patient is age 18 or older        albuterol (PROAIR HFA/PROVENTIL HFA/VENTOLIN HFA) 108 (90 Base) MCG/ACT inhaler  Last Written Prescription Date:  1104/2019  Last Fill Quantity: 54g,  # refills: 1   Last office visit: No previous visit found with prescribing provider:  09/06/2019 DEANN Garcia   Future Office Visit:      traZODone (DESYREL) 100 MG tablet  Last Written Prescription Date:  " 02/17/2020  Last Fill Quantity: 90 tablet,  # refills: 0   Last office visit: No previous visit found with prescribing provider:  09/06/2019  DEANN Garcia   Future Office Visit:      ACT Total Scores 1/12/2016 8/22/2017 5/16/2019   ACT TOTAL SCORE - - -   ASTHMA ER VISITS - - -   ASTHMA HOSPITALIZATIONS - - -   ACT TOTAL SCORE (Goal Greater than or Equal to 20) 24 22 23   In the past 12 months, how many times did you visit the emergency room for your asthma without being admitted to the hospital? 0 0 0   In the past 12 months, how many times were you hospitalized overnight because of your asthma? 0 0 0     Flores TOM (JOSÉ MIGUEL) (M)

## 2020-02-18 RX ORDER — TRAZODONE HYDROCHLORIDE 100 MG/1
100 TABLET ORAL AT BEDTIME
Qty: 90 TABLET | Refills: 0 | OUTPATIENT
Start: 2020-02-18

## 2020-02-18 NOTE — TELEPHONE ENCOUNTER
ACT sent to pt via [email or web address].  Last OV: Return in about 8 months (around 5/6/2020) for Routine Visit.    ACT Total Scores 1/12/2016 8/22/2017 5/16/2019   ACT TOTAL SCORE - - -   ASTHMA ER VISITS - - -   ASTHMA HOSPITALIZATIONS - - -   ACT TOTAL SCORE (Goal Greater than or Equal to 20) 24 22 23   In the past 12 months, how many times did you visit the emergency room for your asthma without being admitted to the hospital? 0 0 0   In the past 12 months, how many times were you hospitalized overnight because of your asthma? 0 0 0     Imelda SEWELL RN

## 2020-02-20 NOTE — TELEPHONE ENCOUNTER
Pt read 2/18/2020 [employer] message, asking him to complete ACT, did not complete.  I left a message on phone [phone] (M) for the patient to return my call, to complete ACT via phone.     ACT Total Scores 1/12/2016 8/22/2017 5/16/2019   ACT TOTAL SCORE - - -   ASTHMA ER VISITS - - -   ASTHMA HOSPITALIZATIONS - - -   ACT TOTAL SCORE (Goal Greater than or Equal to 20) 24 22 23   In the past 12 months, how many times did you visit the emergency room for your asthma without being admitted to the hospital? 0 0 0   In the past 12 months, how many times were you hospitalized overnight because of your asthma? 0 0 0       Imelda SEWELL RN

## 2020-02-24 RX ORDER — ALBUTEROL SULFATE 90 UG/1
2 AEROSOL, METERED RESPIRATORY (INHALATION) EVERY 6 HOURS PRN
Qty: 1 INHALER | Refills: 0 | Status: SHIPPED | OUTPATIENT
Start: 2020-02-24

## 2020-05-14 ENCOUNTER — OFFICE VISIT (OUTPATIENT)
Dept: FAMILY MEDICINE | Facility: CLINIC | Age: 75
End: 2020-05-14
Payer: COMMERCIAL

## 2020-05-14 VITALS
DIASTOLIC BLOOD PRESSURE: 84 MMHG | TEMPERATURE: 98.3 F | HEART RATE: 88 BPM | SYSTOLIC BLOOD PRESSURE: 130 MMHG | HEIGHT: 68 IN | BODY MASS INDEX: 34.56 KG/M2 | WEIGHT: 228 LBS | RESPIRATION RATE: 18 BRPM

## 2020-05-14 DIAGNOSIS — I25.10 CORONARY ARTERY DISEASE INVOLVING NATIVE HEART WITHOUT ANGINA PECTORIS, UNSPECIFIED VESSEL OR LESION TYPE: ICD-10-CM

## 2020-05-14 DIAGNOSIS — I10 ESSENTIAL HYPERTENSION: ICD-10-CM

## 2020-05-14 DIAGNOSIS — R41.0 CONFUSION: ICD-10-CM

## 2020-05-14 DIAGNOSIS — G40.909 SEIZURE DISORDER (H): Primary | ICD-10-CM

## 2020-05-14 DIAGNOSIS — G25.81 RESTLESS LEGS SYNDROME (RLS): ICD-10-CM

## 2020-05-14 DIAGNOSIS — T84.59XD INFECTION OF PROSTHETIC SHOULDER JOINT, SUBSEQUENT ENCOUNTER: ICD-10-CM

## 2020-05-14 DIAGNOSIS — R73.03 PREDIABETES: ICD-10-CM

## 2020-05-14 DIAGNOSIS — E78.5 HYPERLIPIDEMIA WITH TARGET LDL LESS THAN 70: ICD-10-CM

## 2020-05-14 DIAGNOSIS — H90.3 SENSORINEURAL HEARING LOSS, BILATERAL: ICD-10-CM

## 2020-05-14 DIAGNOSIS — G47.00 INSOMNIA, UNSPECIFIED TYPE: ICD-10-CM

## 2020-05-14 DIAGNOSIS — Z96.619 INFECTION OF PROSTHETIC SHOULDER JOINT, SUBSEQUENT ENCOUNTER: ICD-10-CM

## 2020-05-14 PROBLEM — E11.9 TYPE 2 DIABETES MELLITUS WITHOUT COMPLICATION, WITHOUT LONG-TERM CURRENT USE OF INSULIN (H): Status: ACTIVE | Noted: 2020-05-14

## 2020-05-14 LAB
ANION GAP SERPL CALCULATED.3IONS-SCNC: 3 MMOL/L (ref 3–14)
BASOPHILS # BLD AUTO: 0.1 10E9/L (ref 0–0.2)
BASOPHILS NFR BLD AUTO: 0.6 %
BUN SERPL-MCNC: 10 MG/DL (ref 7–30)
CALCIUM SERPL-MCNC: 9.4 MG/DL (ref 8.5–10.1)
CHLORIDE SERPL-SCNC: 106 MMOL/L (ref 94–109)
CHOLEST SERPL-MCNC: 133 MG/DL
CO2 SERPL-SCNC: 32 MMOL/L (ref 20–32)
CREAT SERPL-MCNC: 0.7 MG/DL (ref 0.66–1.25)
DIFFERENTIAL METHOD BLD: NORMAL
EOSINOPHIL # BLD AUTO: 0.1 10E9/L (ref 0–0.7)
EOSINOPHIL NFR BLD AUTO: 1.2 %
ERYTHROCYTE [DISTWIDTH] IN BLOOD BY AUTOMATED COUNT: 13.2 % (ref 10–15)
GFR SERPL CREATININE-BSD FRML MDRD: >90 ML/MIN/{1.73_M2}
GLUCOSE SERPL-MCNC: 192 MG/DL (ref 70–99)
HBA1C MFR BLD: 6.7 % (ref 0–5.6)
HCT VFR BLD AUTO: 46.4 % (ref 40–53)
HDLC SERPL-MCNC: 48 MG/DL
HGB BLD-MCNC: 15.3 G/DL (ref 13.3–17.7)
IMM GRANULOCYTES # BLD: 0.1 10E9/L (ref 0–0.4)
IMM GRANULOCYTES NFR BLD: 1.2 %
LDLC SERPL CALC-MCNC: 33 MG/DL
LYMPHOCYTES # BLD AUTO: 1.6 10E9/L (ref 0.8–5.3)
LYMPHOCYTES NFR BLD AUTO: 14 %
MCH RBC QN AUTO: 30.4 PG (ref 26.5–33)
MCHC RBC AUTO-ENTMCNC: 33 G/DL (ref 31.5–36.5)
MCV RBC AUTO: 92 FL (ref 78–100)
MONOCYTES # BLD AUTO: 1 10E9/L (ref 0–1.3)
MONOCYTES NFR BLD AUTO: 8.9 %
NEUTROPHILS # BLD AUTO: 8.2 10E9/L (ref 1.6–8.3)
NEUTROPHILS NFR BLD AUTO: 74.1 %
NONHDLC SERPL-MCNC: 85 MG/DL
NRBC # BLD AUTO: 0 10*3/UL
NRBC BLD AUTO-RTO: 0 /100
PLATELET # BLD AUTO: 239 10E9/L (ref 150–450)
POTASSIUM SERPL-SCNC: 4.1 MMOL/L (ref 3.4–5.3)
RBC # BLD AUTO: 5.03 10E12/L (ref 4.4–5.9)
SODIUM SERPL-SCNC: 141 MMOL/L (ref 133–144)
TRIGL SERPL-MCNC: 259 MG/DL
TSH SERPL DL<=0.005 MIU/L-ACNC: 1.21 MU/L (ref 0.4–4)
VIT B12 SERPL-MCNC: 1411 PG/ML (ref 193–986)
WBC # BLD AUTO: 11.1 10E9/L (ref 4–11)

## 2020-05-14 PROCEDURE — 99214 OFFICE O/P EST MOD 30 MIN: CPT | Performed by: PHYSICIAN ASSISTANT

## 2020-05-14 PROCEDURE — 36415 COLL VENOUS BLD VENIPUNCTURE: CPT | Performed by: PHYSICIAN ASSISTANT

## 2020-05-14 PROCEDURE — 84443 ASSAY THYROID STIM HORMONE: CPT | Performed by: PHYSICIAN ASSISTANT

## 2020-05-14 PROCEDURE — 83036 HEMOGLOBIN GLYCOSYLATED A1C: CPT | Performed by: PHYSICIAN ASSISTANT

## 2020-05-14 PROCEDURE — 85027 COMPLETE CBC AUTOMATED: CPT | Performed by: PHYSICIAN ASSISTANT

## 2020-05-14 PROCEDURE — 80048 BASIC METABOLIC PNL TOTAL CA: CPT | Performed by: PHYSICIAN ASSISTANT

## 2020-05-14 PROCEDURE — 83921 ORGANIC ACID SINGLE QUANT: CPT | Performed by: PHYSICIAN ASSISTANT

## 2020-05-14 PROCEDURE — 85004 AUTOMATED DIFF WBC COUNT: CPT | Performed by: PHYSICIAN ASSISTANT

## 2020-05-14 PROCEDURE — 82607 VITAMIN B-12: CPT | Performed by: PHYSICIAN ASSISTANT

## 2020-05-14 PROCEDURE — 80061 LIPID PANEL: CPT | Performed by: PHYSICIAN ASSISTANT

## 2020-05-14 RX ORDER — CEFADROXIL 500 MG/1
CAPSULE ORAL
Qty: 180 CAPSULE | Refills: 3 | Status: SHIPPED | OUTPATIENT
Start: 2020-05-14

## 2020-05-14 RX ORDER — TRAZODONE HYDROCHLORIDE 100 MG/1
100 TABLET ORAL AT BEDTIME
Qty: 90 TABLET | Refills: 0 | Status: SHIPPED | OUTPATIENT
Start: 2020-05-14 | End: 2020-11-23

## 2020-05-14 RX ORDER — GABAPENTIN 400 MG/1
400 CAPSULE ORAL AT BEDTIME
Qty: 90 CAPSULE | Refills: 1 | Status: SHIPPED | OUTPATIENT
Start: 2020-05-14 | End: 2020-07-13

## 2020-05-14 RX ORDER — ATENOLOL 50 MG/1
50 TABLET ORAL DAILY
Qty: 90 TABLET | Refills: 0 | Status: SHIPPED | OUTPATIENT
Start: 2020-05-14 | End: 2020-10-19

## 2020-05-14 RX ORDER — HYDROCHLOROTHIAZIDE 25 MG/1
25 TABLET ORAL EVERY MORNING
Qty: 90 TABLET | Refills: 0 | Status: SHIPPED | OUTPATIENT
Start: 2020-05-14 | End: 2020-07-08

## 2020-05-14 RX ORDER — ATORVASTATIN CALCIUM 80 MG/1
80 TABLET, FILM COATED ORAL DAILY
Qty: 90 TABLET | Refills: 3 | Status: SHIPPED | OUTPATIENT
Start: 2020-05-14

## 2020-05-14 RX ORDER — LISINOPRIL 40 MG/1
TABLET ORAL
Qty: 90 TABLET | Refills: 0 | Status: SHIPPED | OUTPATIENT
Start: 2020-05-14 | End: 2020-10-19

## 2020-05-14 ASSESSMENT — MIFFLIN-ST. JEOR: SCORE: 1738.7

## 2020-05-14 NOTE — PROGRESS NOTES
"Subjective     Cas Noriega is a 74 year old male who presents to clinic today for the following health issues:    HPI        Was in hospital in [place] for dizziness, treated for unclear infection.  Sounds like was septic.    Since then some confusion.   After this hospitalization, wife says he started being confused and no motivation.  She feels he is having mini seizures.  He dislikes the word \"seizure\", prefers to call them \"trips\".  He can tell when its going to happen, gets anxious.   Is sitting still, he goes all tingly, R wrist and R eyelid twitches.  Lasts 30 seconds.  Will not respond to wife during that time.  No loss of b/b.  He has confusion for a few seconds after.  Had EMG which didn't show anything.  Had brain CT, couldn't do MRI with his cochlear implant.  Did not see neurology.    Chronic R shoulder hardware infection.  See by infectious disease specialist while in hospital, sounds like no med change.    HTN - not monitoring.    MARTY severe, on CPAP.  RLS, on gabapentin, doing well.  Insomnia - doing well, not tired in morning.  Saw sleep specialist June 2019.  Also taking unisom.     CAD - asymptomatic.  Last cardiology July 2019. No chest pains, chest pressures, SOB or sudden change of endurance.  Still a bit out of breathe with stairs but this is unchanged.  HTN - checks occasionally, usually \"pretty close to 130-140/60-80\".     Prediabetes.  a1c 5.8 last yr.    BP Readings from Last 3 Encounters:   05/14/20 130/84   09/06/19 127/73   07/26/19 120/70    Wt Readings from Last 3 Encounters:   05/14/20 103.4 kg (228 lb)   09/06/19 108 kg (238 lb)   07/26/19 105.2 kg (232 lb)         Reviewed and updated as needed this visit by Provider         Review of Systems   Constitutional, HEENT, cardiovascular, pulmonary, GI, , musculoskeletal, neuro, and psych systems are negative, except as otherwise noted.      Objective    /84 (Cuff Size: Adult Large)   Pulse 88   Temp 98.3  F (36.8  C) " "(Tympanic)   Resp 18   Ht 1.727 m (5' 8\")   Wt 103.4 kg (228 lb)   BMI 34.67 kg/m    Body mass index is 34.67 kg/m .  Physical Exam   GENERAL: healthy, alert and no distress  RESP: lungs clear to auscultation - no rales, rhonchi or wheezes  CV: regular rate and rhythm, normal S1 S2, no S3 or S4, no murmur, click or rub  NEURO: mentation intact and speech normal.  I do witness a spell today which is exactly as wife described above.      Diagnostic Test Results:  Labs reviewed in Epic        Assessment & Plan       ICD-10-CM    1. Seizure disorder (H)  G40.909 NEUROLOGY ADULT REFERRAL     TSH with free T4 reflex     Vitamin B12     Methylmalonic Acid     CBC with platelets   2. Confusion  R41.0 TSH with free T4 reflex   3. Essential hypertension  I10 Basic metabolic panel  (Ca, Cl, CO2, Creat, Gluc, K, Na, BUN)     TSH with free T4 reflex     atenolol (TENORMIN) 50 MG tablet     hydrochlorothiazide (HYDRODIURIL) 25 MG tablet     lisinopril (ZESTRIL) 40 MG tablet   4. Sensorineural hearing loss, bilateral  H90.3 WBC Differential   5. Coronary artery disease involving native heart without angina pectoris, unspecified vessel or lesion type  I25.10 atorvastatin (LIPITOR) 80 MG tablet     Lipid panel reflex to direct LDL Non-fasting     CARDIOLOGY EVAL ADULT REFERRAL   6. Infection of prosthetic shoulder joint, subsequent encounter  T84.59XD cefadroxil (DURICEF) 500 MG capsule    Z96.619    7. Hyperlipidemia with target LDL less than 70  E78.5 atorvastatin (LIPITOR) 80 MG tablet   8. Insomnia, unspecified type  G47.00 traZODone (DESYREL) 100 MG tablet   9. Restless legs syndrome (RLS)  G25.81 gabapentin (NEURONTIN) 400 MG capsule   10. Prediabetes  R73.03 Hemoglobin A1c     Patient Instructions   Labs today     Sign for records  Set up neurology appt - referral given  Advise no driving until \"trips\" are figured out  Try to get a video of a \"trip\" to take to the neurologist    Get BP cuff.  Record BP and pulses.  Bring " to appt with me in 2 weeks.    Figure out what OTC sleep medicine he is taking - let me know at next appt.    Due for cardiology recheck in July            Return in about 2 weeks (around 5/28/2020).    Sera Garcia PA-C  [facility]

## 2020-05-14 NOTE — PATIENT INSTRUCTIONS
"Labs today     Sign for records  Set up neurology appt - referral given  Advise no driving until \"trips\" are figured out  Try to get a video of a \"trip\" to take to the neurologist    Get BP cuff.  Record BP and pulses.  Bring to appt with me in 2 weeks.    Figure out what OTC sleep medicine he is taking - let me know at next appt.    Due for cardiology recheck in July        "

## 2020-05-14 NOTE — RESULT ENCOUNTER NOTE
Cas,    Some labs are still in process.      I did send your other medications.  Sorry for delay on that.    Regarding sleep medication, I'd try to stop the unisom.  It can cause some confusion.  We could try increasing gabapentin or trazodone.  I would suggest increasing the gabapentin, since it can help sleep as well as actually helping some forms of seizures.  If you're ok with it, let me know.  I'd add a 100 mg capsule to the 400 mg capsule, for total dose of gabapentin 500 mg.    I'll be in touch when the other results are known.    Sera

## 2020-05-14 NOTE — RESULT ENCOUNTER NOTE
Cas,    1 more lab result.  Your sugars have worsened into diabetes.  It is under good control at this time, should not need medication currently.  We can discuss further at recheck appt in 2 wks.    See previous lab note, if you have not already done so.    Sera

## 2020-05-14 NOTE — NURSING NOTE
"Chief Complaint   Patient presents with     shaky     /84 (Cuff Size: Adult Large)   Pulse 88   Temp 98.3  F (36.8  C) (Tympanic)   Resp 18   Ht 1.727 m (5' 8\")   Wt 103.4 kg (228 lb)   BMI 34.67 kg/m   Estimated body mass index is 34.67 kg/m  as calculated from the following:    Height as of this encounter: 1.727 m (5' 8\").    Weight as of this encounter: 103.4 kg (228 lb).  Patient presents to the clinic using No DME      Health Maintenance that is potentially due pending provider review:    Health Maintenance Due   Topic Date Due     ZOSTER IMMUNIZATION (1 of 2) 08/02/1993     ASTHMA ACTION PLAN  12/17/2015     MEDICARE ANNUAL WELLNESS VISIT  03/20/2016     FALL RISK ASSESSMENT  08/22/2018     DTAP/TDAP/TD IMMUNIZATION (2 - Td) 10/18/2018     ASTHMA CONTROL TEST  11/16/2019     PHQ-9  11/16/2019                "

## 2020-05-21 LAB — METHYLMALONATE SERPL-SCNC: 0.17 UMOL/L (ref 0–0.4)

## 2020-06-01 ENCOUNTER — TELEPHONE (OUTPATIENT)
Dept: FAMILY MEDICINE | Facility: CLINIC | Age: 75
End: 2020-06-01

## 2020-06-01 ENCOUNTER — OFFICE VISIT (OUTPATIENT)
Dept: FAMILY MEDICINE | Facility: CLINIC | Age: 75
End: 2020-06-01
Payer: COMMERCIAL

## 2020-06-01 VITALS
SYSTOLIC BLOOD PRESSURE: 110 MMHG | TEMPERATURE: 98.7 F | HEIGHT: 68 IN | DIASTOLIC BLOOD PRESSURE: 72 MMHG | HEART RATE: 80 BPM | BODY MASS INDEX: 33.95 KG/M2 | WEIGHT: 224 LBS | RESPIRATION RATE: 20 BRPM

## 2020-06-01 DIAGNOSIS — I48.92 ATRIAL FLUTTER, UNSPECIFIED TYPE (H): ICD-10-CM

## 2020-06-01 DIAGNOSIS — R50.9 FEVER, UNKNOWN ORIGIN: ICD-10-CM

## 2020-06-01 DIAGNOSIS — J44.9 CHRONIC OBSTRUCTIVE PULMONARY DISEASE, UNSPECIFIED COPD TYPE (H): ICD-10-CM

## 2020-06-01 DIAGNOSIS — E11.9 TYPE 2 DIABETES MELLITUS WITHOUT COMPLICATION, WITHOUT LONG-TERM CURRENT USE OF INSULIN (H): ICD-10-CM

## 2020-06-01 DIAGNOSIS — R74.8 ELEVATED CK: ICD-10-CM

## 2020-06-01 DIAGNOSIS — R56.9 SEIZURES (H): Primary | ICD-10-CM

## 2020-06-01 DIAGNOSIS — G47.00 INSOMNIA, UNSPECIFIED TYPE: ICD-10-CM

## 2020-06-01 DIAGNOSIS — E53.8 LOW SERUM VITAMIN B12: ICD-10-CM

## 2020-06-01 PROCEDURE — 99214 OFFICE O/P EST MOD 30 MIN: CPT | Performed by: PHYSICIAN ASSISTANT

## 2020-06-01 RX ORDER — GABAPENTIN 100 MG/1
CAPSULE ORAL
Qty: 60 CAPSULE | Refills: 0 | Status: SHIPPED | OUTPATIENT
Start: 2020-06-01 | End: 2020-07-13

## 2020-06-01 ASSESSMENT — PATIENT HEALTH QUESTIONNAIRE - PHQ9
5. POOR APPETITE OR OVEREATING: NOT AT ALL
SUM OF ALL RESPONSES TO PHQ QUESTIONS 1-9: 1

## 2020-06-01 ASSESSMENT — ANXIETY QUESTIONNAIRES
5. BEING SO RESTLESS THAT IT IS HARD TO SIT STILL: NOT AT ALL
3. WORRYING TOO MUCH ABOUT DIFFERENT THINGS: NOT AT ALL
2. NOT BEING ABLE TO STOP OR CONTROL WORRYING: NOT AT ALL
1. FEELING NERVOUS, ANXIOUS, OR ON EDGE: NOT AT ALL
6. BECOMING EASILY ANNOYED OR IRRITABLE: NOT AT ALL

## 2020-06-01 ASSESSMENT — MIFFLIN-ST. JEOR: SCORE: 1720.56

## 2020-06-01 NOTE — PATIENT INSTRUCTIONS
Reprinting neurology referral options; have wife call - can ask for in person options, but may need to do video       BP look ok overall, no dose change    For sleep, will add 1-2 caps of gabapentin 100 mg, IN ADDITION to your gabapentin 400 mg.  Update me later this week on sleep.    Still set up cardiology recheck.  Due in July.    Diabetes -   Eventual urine lab test.  Need eye exam, at least every 2 years, but prefer every year.  Make sure they know you have diabetes.    No pills needed for now, just lab test every 3 months.  Eventually see diabetic ed.    Working on getting records still, will contact you with recommendations

## 2020-06-01 NOTE — NURSING NOTE
"Chief Complaint   Patient presents with     Hypertension     Follow up on Neuorologic problem       Initial /72   Pulse 80   Temp 98.7  F (37.1  C) (Tympanic)   Resp 20   Ht 1.727 m (5' 8\")   Wt 101.6 kg (224 lb)   BMI 34.06 kg/m   Estimated body mass index is 34.06 kg/m  as calculated from the following:    Height as of this encounter: 1.727 m (5' 8\").    Weight as of this encounter: 101.6 kg (224 lb).    Patient presents to the clinic using     Health Maintenance that is potentially due pending provider review:          Is there anyone who you would like to be able to receive your results?   If yes have patient fill out JACE    "

## 2020-06-01 NOTE — PROGRESS NOTES
"Subjective     Cas Noriega is a 74 year old male who presents to clinic today for the following health issues:    HPI   Consult on Neuro Appt.      Duration:     Description (location/character/radiation): still having 3-5 episodes per day    Intensity:      Accompanying signs and symptoms:     History (similar episodes/previous evaluation): seen in clinic and ER    Precipitating or alleviating factors: None    Therapies tried and outcome: Was to see Neurologist but was cancelled, told to try the [facility] and has not been able to get appt.     Having \"trips\" (seizures) 2-5 times per day.  No change in symptoms.  Does feel that bright ross days, if he gets warmer, he gets more episodes.    Sleep not better.  Did not get back to me about increase for gabapentin, so has not tried since it was not prescribed.      Hospital records received and rviewed after patient  Visit.  No formal discharge summary.  Acute hypoxic respiratory failure, resolved.  Unclear cause.  Fever unknown origin, cultures neg, procalcitonin mild elev, lactic acidosis, elev CPK, bacteriuria, acute encephalopathy, pos D dimer, HTN, A flutter and A fib.  B12 low.  No clear cause found for infection, some question if was recurrent shoulder hardware infection but ortho found no acute infection.    BP Readings from Last 3 Encounters:   06/01/20 110/72   05/14/20 130/84   09/06/19 127/73    Wt Readings from Last 3 Encounters:   06/01/20 101.6 kg (224 lb)   05/14/20 103.4 kg (228 lb)   09/06/19 108 kg (238 lb)           Reviewed and updated as needed this visit by Provider  Tobacco  Allergies  Meds  Problems  Med Hx  Surg Hx  Fam Hx         Review of Systems   Constitutional, cardiovascular, pulmonary, GI, , musculoskeletal, neuro, skin, endocrine and psych systems are negative, except as otherwise noted.      Objective    /72   Pulse 80   Temp 98.7  F (37.1  C) (Tympanic)   Resp 20   Ht 1.727 m (5' 8\")   Wt 101.6 kg (224 lb)   " BMI 34.06 kg/m    Body mass index is 34.06 kg/m .  Physical Exam   GENERAL: healthy, alert and no distress  RESP: lungs clear to auscultation - no rales, rhonchi or wheezes  CV: regular rate and rhythm, normal S1 S2, no S3 or S4, no murmur, click or rub, no peripheral edema and peripheral pulses strong  PSYCH: mentation appears normal, affect normal/bright    BP log - variable, 105//92    Diagnostic Test Results:  Labs reviewed in Epic        Assessment & Plan       ICD-10-CM    1. Seizures (H)  R56.9 gabapentin (NEURONTIN) 100 MG capsule   2. Type 2 diabetes mellitus without complication, without long-term current use of insulin (H)  E11.9 Albumin Random Urine Quantitative with Creat Ratio   3. Atrial flutter, unspecified type (H)  I48.92 Zio Patch Holter Adult Pediatric Greater than 48 hrs     apixaban ANTICOAGULANT (ELIQUIS) 5 MG tablet   4. Insomnia, unspecified type  G47.00 gabapentin (NEURONTIN) 100 MG capsule   5. Fever, unknown origin  R50.9    6. Elevated CK  R74.8 CK total   7. Chronic obstructive pulmonary disease, unspecified COPD type (H)  J44.9    8. Low serum vitamin B12  E53.8       A flutter and A fib noted during Dec 2019 hospitalization in [place].  Was to be on eliquis.  Unclear to me if this all could have been related to his FUO, question of sepsis.  Will have do zio and have addressed at July cardiology follow-up.    Stressed to patient need for no driving until seizures figured out by neurology  Patient Instructions   Reprinting neurology referral options; have wife call - can ask for in person options, but may need to do video       BP look ok overall, no dose change    For sleep, will add 1-2 caps of gabapentin 100 mg, IN ADDITION to your gabapentin 400 mg.  Update me later this week on sleep.    Still set up cardiology recheck.  Due in July.    Diabetes -   Eventual urine lab test.  Need eye exam, at least every 2 years, but prefer every year.  Make sure they know you have diabetes.    No  pills needed for now, just lab test every 3 months.  Eventually see diabetic ed.    Working on getting records still, will contact you with recommendations      No follow-ups on file.    Sera Garcia PA-C  [facility]

## 2020-06-02 ASSESSMENT — ASTHMA QUESTIONNAIRES: ACT_TOTALSCORE: 23

## 2020-06-02 NOTE — TELEPHONE ENCOUNTER
Patient hard of hearing, wife also somewhat hard of hearing.  Communicate the following, but may need letter to make communication clear.    I did finally receive records from [place] hospitalization.  There was no clear source for infection, presumed possibly from shoulder replacement again.    I did place a CK lab to recheck one of abnormal hospital labs.  It might impact his cholesterol medication dosing.  You can set up lab appt.      Also during hospitalization, he had not normal heart rhythm called atrial fibrillation with atrial flutter.  This has higher risk for stroke, so hospital discharged on eliquis 5 mg twice daily.  I do not see that he is on this right now, but I sent prescription.  He should not be on aspirn anymore, but on eliquis.  This can be expensive, so let us know if cost of this medication is too high.  I do not know if this was related to the illness at the time, so I suggest heart monitor test to see if this is ongoing, then cardiology can decide if needs the eliquis long term.  You still need cardiology appt for July.

## 2020-06-15 ENCOUNTER — HOSPITAL ENCOUNTER (OUTPATIENT)
Dept: CARDIOLOGY | Facility: CLINIC | Age: 75
Discharge: HOME | End: 2020-06-15
Attending: PHYSICIAN ASSISTANT | Admitting: PHYSICIAN ASSISTANT
Payer: COMMERCIAL

## 2020-06-15 DIAGNOSIS — I48.92 ATRIAL FLUTTER, UNSPECIFIED TYPE (H): ICD-10-CM

## 2020-06-15 PROCEDURE — 0296T ZIO PATCH HOLTER ADULT PEDIATRIC GREATER THAN 48 HRS: CPT

## 2020-06-15 PROCEDURE — 0298T ZIO PATCH HOLTER ADULT PEDIATRIC GREATER THAN 48 HRS: CPT | Performed by: INTERNAL MEDICINE

## 2020-07-01 PROBLEM — I48.20 CHRONIC ATRIAL FIBRILLATION (H): Status: ACTIVE | Noted: 2020-07-01

## 2020-07-01 NOTE — RESULT ENCOUNTER NOTE
Cas,    Your heart monitor shows that you continue to have atrial fibrillation/atrial flutter.  This is the altered heartbeat that requires you to continue the eliquis medication.  Discuss further at your cardiology follow up appointment.    Sera

## 2020-07-08 ENCOUNTER — MYC REFILL (OUTPATIENT)
Dept: FAMILY MEDICINE | Facility: CLINIC | Age: 75
End: 2020-07-08

## 2020-07-08 DIAGNOSIS — I10 ESSENTIAL HYPERTENSION: ICD-10-CM

## 2020-07-09 RX ORDER — HYDROCHLOROTHIAZIDE 25 MG/1
25 TABLET ORAL EVERY MORNING
Qty: 90 TABLET | Refills: 0 | Status: SHIPPED | OUTPATIENT
Start: 2020-07-09

## 2020-07-09 NOTE — TELEPHONE ENCOUNTER
"Prescription approved per [facility] Refill Protocol.      Requested Prescriptions   Pending Prescriptions Disp Refills     hydrochlorothiazide (HYDRODIURIL) 25 MG tablet 90 tablet 0     Sig: Take 1 tablet (25 mg) by mouth every morning       Diuretics (Including Combos) Protocol Passed - 7/8/2020  8:49 PM        Passed - Blood pressure under 140/90 in past 12 months     BP Readings from Last 3 Encounters:   06/01/20 110/72   05/14/20 130/84   09/06/19 127/73                 Passed - Recent (12 mo) or future (30 days) visit within the authorizing provider's specialty     Patient has had an office visit with the authorizing provider or a provider within the authorizing providers department within the previous 12 mos or has a future within next 30 days. See \"Patient Info\" tab in inbasket, or \"Choose Columns\" in Meds & Orders section of the refill encounter.              Passed - Medication is active on med list        Passed - Patient is age 18 or older        Passed - Normal serum creatinine on file in past 12 months     Recent Labs   Lab Test 05/14/20  1006   CR 0.70              Passed - Normal serum potassium on file in past 12 months     Recent Labs   Lab Test 05/14/20  1006   POTASSIUM 4.1                    Passed - Normal serum sodium on file in past 12 months     Recent Labs   Lab Test 05/14/20  1006                    Imelda SEWELL RN, BSN      "

## 2020-07-13 ENCOUNTER — OFFICE VISIT (OUTPATIENT)
Dept: FAMILY MEDICINE | Facility: CLINIC | Age: 75
End: 2020-07-13
Payer: COMMERCIAL

## 2020-07-13 VITALS
SYSTOLIC BLOOD PRESSURE: 118 MMHG | HEART RATE: 88 BPM | WEIGHT: 221 LBS | DIASTOLIC BLOOD PRESSURE: 80 MMHG | BODY MASS INDEX: 33.49 KG/M2 | HEIGHT: 68 IN | TEMPERATURE: 98.1 F | RESPIRATION RATE: 18 BRPM

## 2020-07-13 DIAGNOSIS — E66.01 MORBID OBESITY (H): ICD-10-CM

## 2020-07-13 DIAGNOSIS — F32.4 MAJOR DEPRESSIVE DISORDER IN PARTIAL REMISSION, UNSPECIFIED WHETHER RECURRENT (H): ICD-10-CM

## 2020-07-13 DIAGNOSIS — J44.9 CHRONIC OBSTRUCTIVE PULMONARY DISEASE, UNSPECIFIED COPD TYPE (H): ICD-10-CM

## 2020-07-13 DIAGNOSIS — G25.81 RESTLESS LEGS SYNDROME (RLS): ICD-10-CM

## 2020-07-13 DIAGNOSIS — I25.10 CORONARY ARTERY DISEASE INVOLVING NATIVE HEART WITHOUT ANGINA PECTORIS, UNSPECIFIED VESSEL OR LESION TYPE: ICD-10-CM

## 2020-07-13 DIAGNOSIS — I48.92 ATRIAL FLUTTER, UNSPECIFIED TYPE (H): Primary | ICD-10-CM

## 2020-07-13 PROCEDURE — 99214 OFFICE O/P EST MOD 30 MIN: CPT | Performed by: PHYSICIAN ASSISTANT

## 2020-07-13 RX ORDER — GABAPENTIN 800 MG/1
TABLET ORAL
Qty: 90 TABLET | Refills: 1 | Status: SHIPPED | OUTPATIENT
Start: 2020-07-13

## 2020-07-13 RX ORDER — ZONISAMIDE 25 MG/1
25 CAPSULE ORAL DAILY
COMMUNITY

## 2020-07-13 RX ORDER — GABAPENTIN 800 MG/1
TABLET ORAL
Qty: 15 TABLET | Refills: 0 | Status: SHIPPED | OUTPATIENT
Start: 2020-07-13 | End: 2020-07-13

## 2020-07-13 ASSESSMENT — MIFFLIN-ST. JEOR: SCORE: 1706.95

## 2020-07-13 NOTE — PROGRESS NOTES
"Subjective     Cas Noriega is a 74 year old male who presents to clinic today for the following health issues:    HPI     Saw neurologist elsewhere.  Started zonisaide last week, has reduced spells \"trips\" some.  They note this new medication has helped, not perfectly but has reduced.  They note typically would have a lot of trips with physical exertion, but has had few.  Thinks 70% decrease.    Patient is here to follow up with his eliquis.  Cost issues.  Moving to [place] soon, for 9 mo of the year, will be here in summers in .      BP Readings from Last 3 Encounters:   07/13/20 118/80   06/01/20 110/72   05/14/20 130/84    Wt Readings from Last 3 Encounters:   07/13/20 100.2 kg (221 lb)   06/01/20 101.6 kg (224 lb)   05/14/20 103.4 kg (228 lb)         Reviewed and updated as needed this visit by Provider  Tobacco  Allergies  Meds  Problems  Med Hx  Surg Hx  Fam Hx         Review of Systems   Constitutional, HEENT, cardiovascular, pulmonary, GI, , musculoskeletal, neuro, skin, endocrine and psych systems are negative, except as otherwise noted.      Objective    /80 (BP Location: Right arm, Cuff Size: Adult Large)   Pulse 88   Temp 98.1  F (36.7  C) (Tympanic)   Resp 18   Ht 1.727 m (5' 8\")   Wt 100.2 kg (221 lb)   BMI 33.60 kg/m    Body mass index is 33.6 kg/m .  Physical Exam   GENERAL: healthy, alert and no distress  RESP: lungs clear to auscultation - no rales, rhonchi or wheezes  CV: regular rate and rhythm, normal S1 S2, no S3 or S4, no murmur, click or rub, no peripheral edema and peripheral pulses strong  PSYCH: mentation appears normal, affect normal/bright    Diagnostic Test Results:  Labs reviewed in Epic        Assessment & Plan       ICD-10-CM    1. Atrial flutter, unspecified type (H)  I48.92 apixaban ANTICOAGULANT (ELIQUIS) 5 MG tablet     DISCONTINUED: apixaban ANTICOAGULANT (ELIQUIS) 5 MG tablet   2. Morbid obesity (H)  E66.01    3. Major depressive disorder in partial " "remission, unspecified whether recurrent (H)  F32.4    4. Chronic obstructive pulmonary disease, unspecified COPD type (H)  J44.9 COPD ACTION PLAN   5. Restless legs syndrome (RLS)  G25.81 gabapentin (NEURONTIN) 800 MG tablet     DISCONTINUED: gabapentin (NEURONTIN) 800 MG tablet   6. Coronary artery disease involving native heart without angina pectoris, unspecified vessel or lesion type  I25.10        Patient Instructions   Spells -  Call neurology to update them on your \"trips\" - they may want to adjust dosing    Sleep -  Mix up in dose, you were taking 900 mg  Try 800 mg to see if do well enough - single pill   Update me if not doing well enough  Can try off trazodone to see if you really need it    A fib -  Decided to stay on eliquis for now  Can get me your drug formulary if want to check for cheaper costs.  Discussed cheap coumadin option, but not great option especially with upcoming move     If here, recheck in 6 months if doing well       Return in about 6 months (around 1/13/2021).    Sera Garcia PA-C  [facility]      "

## 2020-07-13 NOTE — LETTER
My COPD Action Plan     Name: Cas Noriega    YOB: 1945   Date: 7/13/2020    My doctor: Sera Garcia PA-C   My clinic: [address] [address]  [phone]  My Controller Medicine:    Dose:      My Rescue Medicine:    Dose:      My Flare Up Medicine:    Dose:      My COPD Severity:       Use of Oxygen: Oxygen Not Prescribed      Make sure you've had your pneumonia   vaccines.          GREEN ZONE       Doing well today      Usual level of activity and exercise    Usual amount of cough and mucus    No shortness of breath    Usual level of health (thinking clearly, sleeping well, feel like eating) Actions:      Take daily medicines    Use oxygen as prescribed    Follow regular exercise and diet plan    Avoid cigarette smoke and other irritants that harm the lungs           YELLOW ZONE          Having a bad day or flare up      Short of breath more than usual    A lot more sputum (mucus) than usual    Sputum looks yellow, green, tan, brown or bloody    More coughing or wheezing    Fever or chills    Less energy; trouble completing activities    Trouble thinking or focusing    Using quick relief inhaler or nebulizer more often    Poor sleep; symptoms wake me up    Do not feel like eating Actions:      Get plenty of rest    Take daily medicines    Use quick relief inhaler every 4 hours    If you use oxygen, call you doctor to see if you should adjust your oxygen    Do breathing exercises or other things to help you relax    Let a loved one, friend or neighbor know you are feeling worse    Call your care team if you have 2 or more symptoms.  Start taking steroids or antibiotics if directed by your care team           RED ZONE       Need medical care now      Severe shortness of breath (feel you can't breathe)    Fever, chills    Not enough breath to do any activity    Trouble coughing up mucus, walking or talking    Blood in  mucus    Frequent coughing   Rescue medicines are not working    Not able to sleep because of breathing    Feel confused or drowsy    Chest pain    Actions:      Call your health care team.  If you cannot reach your care team, call 911 or go to the emergency room.        Annual Reminders:  Meet with Care Team, Flu Shot every Fall  Pharmacy:    [facility] - [place], [place] - [address]   [facility]  [facility]ALCOLM POOLE  [facility] [address] [place] [address] - [address] [facility] MAIL ORDER  [place], [place] - [address] [facility]  RIGHT SOURCE FAX ONLY - REFILL RX ONLY  [facility], [place] - [address] [place] [address] PHARMACY #[id number] - [place], [place] - [address]  WRITTEN PRESCRIPTION REQUESTED  [facility] MAIL DELIVERY - [place], [place] - [address]

## 2020-07-14 ENCOUNTER — TELEPHONE (OUTPATIENT)
Dept: FAMILY MEDICINE | Facility: CLINIC | Age: 75
End: 2020-07-14

## 2020-07-27 ENCOUNTER — TELEPHONE (OUTPATIENT)
Dept: FAMILY MEDICINE | Facility: CLINIC | Age: 75
End: 2020-07-27

## 2020-07-27 NOTE — TELEPHONE ENCOUNTER
Notify that I did look through his drug formulary and all medications similar to eliquis (savyasa, pradaxa, xarelto) are same cost or more expensive.  Only cheaper option is to do coumadin, which we discussed had ongoing blood testing.    Perhaps give Steffany John phone number for them to see if she can get any  assistance?    And do they want drug formulary back?  We can't keep, placed in green folder.

## 2020-07-28 NOTE — TELEPHONE ENCOUNTER
Patient came to clinic. Spoke with him due to [place]. Gave him a print out of Sera's note and phone number to call. Gave him is formulary back.     Leah Rivera, CMA

## 2020-08-11 ENCOUNTER — TELEPHONE (OUTPATIENT)
Dept: FAMILY MEDICINE | Facility: CLINIC | Age: 75
End: 2020-08-11

## 2020-08-11 NOTE — TELEPHONE ENCOUNTER
CARMENCITAI~ Aug. 11, 2020 I spoke with Yojana, on behalf of Tay, he is in need of financial assistance for medication.    We reviewed the Prescription Assistance Program for manfacturer brand name assistance programs, gross income, insurance and Rx list.    Tay is over income for the Pharmacy Assistance Fund $500.     assistance applications will be completed for Eliquis & Ventolin HFA. When approved, Cas will receive this medication at no cost for the remainder of 2020.    Steffany Lopez  Prescription   Pharmacy Assistance  [phone]

## 2020-09-29 ENCOUNTER — TELEPHONE (OUTPATIENT)
Dept: FAMILY MEDICINE | Facility: CLINIC | Age: 75
End: 2020-09-29

## 2020-09-29 NOTE — TELEPHONE ENCOUNTER
Cas is no longer eligible for the Prescription Assistance Program.  He has moved to [place].    I have closed his case and archived his file.    Steffany Lopez  Prescription   Pharmacy Assistance  [phone]

## 2020-10-19 DIAGNOSIS — I10 ESSENTIAL HYPERTENSION: ICD-10-CM

## 2020-10-19 RX ORDER — LISINOPRIL 40 MG/1
TABLET ORAL
Qty: 90 TABLET | Refills: 0 | Status: SHIPPED | OUTPATIENT
Start: 2020-10-19 | End: 2021-01-04

## 2020-10-19 RX ORDER — ATENOLOL 50 MG/1
TABLET ORAL
Qty: 90 TABLET | Refills: 0 | Status: SHIPPED | OUTPATIENT
Start: 2020-10-19 | End: 2021-01-04

## 2020-11-08 ENCOUNTER — HEALTH MAINTENANCE LETTER (OUTPATIENT)
Age: 75
End: 2020-11-08

## 2020-11-23 DIAGNOSIS — G47.00 INSOMNIA, UNSPECIFIED TYPE: ICD-10-CM

## 2020-11-23 RX ORDER — TRAZODONE HYDROCHLORIDE 100 MG/1
100 TABLET ORAL AT BEDTIME
Qty: 90 TABLET | Refills: 1 | Status: SHIPPED | OUTPATIENT
Start: 2020-11-23

## 2020-12-29 ENCOUNTER — TELEPHONE (OUTPATIENT)
Dept: AUDIOLOGY | Facility: CLINIC | Age: 75
End: 2020-12-29

## 2020-12-29 NOTE — TELEPHONE ENCOUNTER
[facility] Call Center    Phone Message    May a detailed message be left on voicemail: yes     Reason for Call: Other:   Dr Iveth Ghosh, Audiologist in [place] is calling to speak to Linus or clinic team in regards to pt's cochlear implant. Dr Ghosh mentioned that Pt has been having mini seizures and pt's PCP seem to think it's related to the CI. Pt also received a recall letter as well (from CI ?). Unable to reach clinic to connect with. PT IS IN THE OFFICE RIGHT NOW WITH DR AN.     Action Taken: Other:  ENT    Travel Screening: Not Applicable

## 2020-12-29 NOTE — TELEPHONE ENCOUNTER
Spoke to Dr. Murguia. She has not heard of any link between seizures and CI. There is no neurologic symptoms related to the device. Is recommending he needs to be evaluated down there. Spoke to the audiologist and she will let Dr. Castellanos know.     Abbi Estes LPN

## 2020-12-29 NOTE — TELEPHONE ENCOUNTER
Called patient and let him know we are forwarding his concern to the medical team and someone will get back to him in the next 24 to 48 hours.        Isidro Louie, [facility]  Licensed Audiologist  [place] #[id number]

## 2020-12-30 ENCOUNTER — TELEPHONE (OUTPATIENT)
Dept: AUDIOLOGY | Facility: CLINIC | Age: 75
End: 2020-12-30

## 2020-12-30 NOTE — TELEPHONE ENCOUNTER
Returned call to [facility].  They would like patient's SoundWave cochlear implant programming file emailed to [email or web address]. We do not have a release of information form on file for [employer].  Ex[employer].in message was sent to patient asking him to sign release form and then we can send records to [employer].  [employer] reports they do not need the programming file until next week since the provider is out of clinic.      Isidro Fink, CCC-A, [facility]  Licensed Audiologist  [place] #[id number]

## 2021-01-02 DIAGNOSIS — I10 ESSENTIAL HYPERTENSION: ICD-10-CM

## 2021-01-04 RX ORDER — ATENOLOL 50 MG/1
TABLET ORAL
Qty: 90 TABLET | Refills: 0 | Status: SHIPPED | OUTPATIENT
Start: 2021-01-04 | End: 2021-03-22

## 2021-01-04 RX ORDER — LISINOPRIL 40 MG/1
TABLET ORAL
Qty: 90 TABLET | Refills: 0 | Status: SHIPPED | OUTPATIENT
Start: 2021-01-04 | End: 2021-03-22

## 2021-01-20 ENCOUNTER — TELEPHONE (OUTPATIENT)
Dept: AUDIOLOGY | Facility: CLINIC | Age: 76
End: 2021-01-20

## 2021-01-20 NOTE — TELEPHONE ENCOUNTER
[facility] [phone] submitted on behalf of the patient.    Peña Childress, [facility]  Licensed Audiologist  [place] License #[id number]

## 2021-03-11 ENCOUNTER — TRANSFERRED RECORDS (OUTPATIENT)
Dept: HEALTH INFORMATION MANAGEMENT | Facility: CLINIC | Age: 76
End: 2021-03-11

## 2021-03-20 DIAGNOSIS — I10 ESSENTIAL HYPERTENSION: ICD-10-CM

## 2021-03-22 RX ORDER — ATENOLOL 50 MG/1
TABLET ORAL
Qty: 90 TABLET | Refills: 0 | Status: SHIPPED | OUTPATIENT
Start: 2021-03-22 | End: 2021-06-04

## 2021-03-22 RX ORDER — LISINOPRIL 40 MG/1
TABLET ORAL
Qty: 90 TABLET | Refills: 0 | Status: SHIPPED | OUTPATIENT
Start: 2021-03-22 | End: 2021-06-04

## 2021-03-27 ENCOUNTER — HEALTH MAINTENANCE LETTER (OUTPATIENT)
Age: 76
End: 2021-03-27

## 2021-05-28 ENCOUNTER — RECORDS - HEALTHEAST (OUTPATIENT)
Dept: ADMINISTRATIVE | Facility: CLINIC | Age: 76
End: 2021-05-28

## 2021-06-04 DIAGNOSIS — I10 ESSENTIAL HYPERTENSION: ICD-10-CM

## 2021-06-08 RX ORDER — LISINOPRIL 40 MG/1
TABLET ORAL
Qty: 90 TABLET | Refills: 0 | Status: SHIPPED | OUTPATIENT
Start: 2021-06-08

## 2021-06-08 RX ORDER — ATENOLOL 50 MG/1
TABLET ORAL
Qty: 90 TABLET | Refills: 0 | Status: SHIPPED | OUTPATIENT
Start: 2021-06-08

## 2021-06-16 ENCOUNTER — ALLIED HEALTH/NURSE VISIT (OUTPATIENT)
Dept: FAMILY MEDICINE | Facility: CLINIC | Age: 76
End: 2021-06-16
Payer: COMMERCIAL

## 2021-06-16 DIAGNOSIS — G47.33 OSA (OBSTRUCTIVE SLEEP APNEA): Primary | ICD-10-CM

## 2021-06-16 PROCEDURE — 99207 PR NO CHARGE NURSE ONLY: CPT

## 2021-06-16 NOTE — PROGRESS NOTES
Patient here for new C-Pap machine.  Patient unable to hear, reads lips.  Used lip reader mask to communicate with patient.  Advised patient that Dr. Recinos and sleep clinic is no longer in [facility].  Gave phone numbers and addresses for [facility] locations.  Patient also asking for [place] [facility].  Located that number as well.    Karin Graham RN

## 2021-07-17 ENCOUNTER — HEALTH MAINTENANCE LETTER (OUTPATIENT)
Age: 76
End: 2021-07-17

## 2021-09-06 NOTE — PROGRESS NOTES
HISTORY OF PRESENT ILLNESS:  Mr. Noriega is a 71-year-old man who has a right [employer] cochlear implant and says that he is doing great with it but that he still misses things, that he has trouble hearing, particularly in background and that if people speak too quickly that he has to ask them to repeat.  The patient is anxious to get another cochlear implant for the left side.  Angelina Jerome has seen him.  He has gone over the issues with him and he is a candidate.  His scans are from 2015 and they look normal.  There is no contraindication here.  Medically he has an extensive medical history including the fact that he has sleep apnea and that he has hypertension.  He has had a history of an acute MI.  He has restless leg.  He has major depression.  He has had back problems and has had shoulder problems.  The patient is on a long list of medications, which I have gone over today.  He appears to be stable medically.  He certainly is able to walk up and down steps and has been living in active life.      PHYSICAL EXAMINATION:  Examination today shows that his ears are normal.  Facial nerves are House-Brackmann I bilaterally.  The right cochlear implant is in place.  The incision line is clean and dry.  I do not see any other abnormalities.       IMPRESSION AND PLAN:  My overall impression is that he is a good candidate for a left [employer] cochlear implant.  We talked about the risks.  He did not have any questions today because he said he remembers the discussion from the first time through.  He is anxious to go ahead and get it scheduled now so that he can get his rehab done before next winter.         LY/lzEzequiel       
DISPLAY PLAN FREE TEXT

## 2021-09-11 ENCOUNTER — HEALTH MAINTENANCE LETTER (OUTPATIENT)
Age: 76
End: 2021-09-11

## 2021-11-06 ENCOUNTER — HEALTH MAINTENANCE LETTER (OUTPATIENT)
Age: 76
End: 2021-11-06

## 2022-02-26 ENCOUNTER — HEALTH MAINTENANCE LETTER (OUTPATIENT)
Age: 77
End: 2022-02-26

## 2022-04-23 ENCOUNTER — HEALTH MAINTENANCE LETTER (OUTPATIENT)
Age: 77
End: 2022-04-23

## 2022-05-19 NOTE — TELEPHONE ENCOUNTER
FUTURE VISIT INFORMATION      FUTURE VISIT INFORMATION:    Date: 22    Time: 10AM    [place] [facility] [place] INFORMATION:    Referring provider:      Referring providers clinic:      Reason for visit/diagnosis  Consult -Patient needs Cochlear Magnet Removed from Implant to get an MRI done for reoccurring seizures. Magnet is NOT MRI Compatible. Patient wants to know if there are MRI Compatible Implants. Patient was last seen with Dr. Turpin.-Appt Per PT    RECORDS REQUESTED FROM:       Clinic name Comments Records Status Imaging Status   [facility] [place] 19 note from Sruthi Loomis  10/18/17 audiogram   17 note from Dr Turpin    3/27/17 Left Cochlear Implant [employer]  12/17/15 Right [employer] Cochlear Implant Epic    Imaging 11/4/15 CT Temporal bone  Epic PACS   [place] [address] [place] [address]   Ph. [phone]  Fx. [phone] ([facility]), [phone] ([place]) 19 CT Brain  19 CT Brain     *trackin   Scanned in Epic req 22 - received 22 12:43PM sent a fax to [employer] to mail out images - Amay   22 10:27AM disc received and sent to upload - Amay

## 2022-05-24 ENCOUNTER — PRE VISIT (OUTPATIENT)
Dept: OTOLARYNGOLOGY | Facility: CLINIC | Age: 77
End: 2022-05-24

## 2022-05-24 ENCOUNTER — OFFICE VISIT (OUTPATIENT)
Dept: OTOLARYNGOLOGY | Facility: CLINIC | Age: 77
End: 2022-05-24
Payer: COMMERCIAL

## 2022-05-24 ENCOUNTER — OFFICE VISIT (OUTPATIENT)
Dept: AUDIOLOGY | Facility: CLINIC | Age: 77
End: 2022-05-24
Payer: COMMERCIAL

## 2022-05-24 VITALS
WEIGHT: 239 LBS | HEART RATE: 67 BPM | SYSTOLIC BLOOD PRESSURE: 100 MMHG | BODY MASS INDEX: 36.34 KG/M2 | TEMPERATURE: 97.9 F | DIASTOLIC BLOOD PRESSURE: 72 MMHG

## 2022-05-24 DIAGNOSIS — Z96.21 COCHLEAR IMPLANT IN PLACE: ICD-10-CM

## 2022-05-24 DIAGNOSIS — H90.3 SENSORY HEARING LOSS, BILATERAL: Primary | ICD-10-CM

## 2022-05-24 DIAGNOSIS — G40.909 SEIZURE DISORDER (H): ICD-10-CM

## 2022-05-24 DIAGNOSIS — H90.3 SENSORINEURAL HEARING LOSS, BILATERAL: Primary | ICD-10-CM

## 2022-05-24 PROCEDURE — 99204 OFFICE O/P NEW MOD 45 MIN: CPT | Mod: GC | Performed by: OTOLARYNGOLOGY

## 2022-05-24 PROCEDURE — 92700 UNLISTED ORL SERVICE/PX: CPT | Performed by: AUDIOLOGIST

## 2022-05-24 ASSESSMENT — PAIN SCALES - GENERAL: PAINLEVEL: NO PAIN (0)

## 2022-05-24 NOTE — LETTER
2022       RE: Cas Noriega  [address]  [address]  [facility] [address]     Dear Colleague,    Thank you for referring your patient, Cas Noriega, to the [facility] [place] at [facility]. Please see a copy of my visit note below.      Neurotology Clinic      Name: Cas Noriega  MRN: [id number]  Age: 76 year old  : 1945  Referring provider: Dr. Turpin  2022      Chief Complaint:   Consultation    History of Present Illness:   Cas Noriega is a 76 year old male with a history of bilateral sensorineural hearing loss s/p right [employer] cochlear implant (2015- HR90K Advantage ms #[id number] ) and left [employer] cochlear implant (3/27/2017 by Dr Turpin - AB Harris Ultra high focus mid-airam electrode  #[id number], V1) unfortunately his left device was part of an AB recall and he underwent explant with reimplantation in [place], [place] 1 year ago and his wife was told the new CI is also not MRI compatible (serial #: [id number], waiting on confirmation from AB so status not confirmed).   Mr. Noriega is being referred back to our department for evaluation for magnet removal due to concerns for focal epilepsy that may require MRI.    Per chart review of [facility] [place] [facility] Neurology note by Dr. Love, Cas has had several seizures daily over the last three years and has been diagnosed with focal epilepsy with several recorded events. CT findings have been unremarkable and although there is low suspicion for malignancy, the ideal imaging modality would be an MRI.     The appointment was conducted using a laptop-based speech to text transcription service. The patient reports that his right cochlear implant makes a high squeaky sound so he is unable to understand the sounds he hears. He states he has had several issues with the left cochlear implant as the processor has been broken and  other processors are not compatible. He was informed that [employer] was to email them and send over new processors and batteries, but he has not received any contact from them.     Cas presents with his wife and states that he has greatly improved since being put on Keppra, and if possible they would ideally avoid any surgery to remove the magnet if it is not necessary. However, they will meet with the Neurologist in the next month to discuss if the MRI is needed or if they may stay on the current treatment plan.     Review of Systems:   Pertinent items are noted in HPI or as in patient entered ROS below, remainder of complete ROS is negative.   No flowsheet data found.     Active Medications:     Current Outpatient Medications:      albuterol (PROAIR HFA/PROVENTIL HFA/VENTOLIN HFA) 108 (90 Base) MCG/ACT inhaler, Inhale 2 puffs into the lungs every 6 hours as needed for shortness of breath / dyspnea or wheezing, Disp: 1 Inhaler, Rfl: 0     apixaban ANTICOAGULANT (ELIQUIS) 5 MG tablet, Take 1 tablet (5 mg) by mouth 2 times daily, Disp: 180 tablet, Rfl: 1     atenolol (TENORMIN) 50 MG tablet, TAKE 1 TABLET EVERY DAY, Disp: 90 tablet, Rfl: 0     atorvastatin (LIPITOR) 80 MG tablet, Take 1 tablet (80 mg) by mouth daily, Disp: 90 tablet, Rfl: 3     cefadroxil (DURICEF) 500 MG capsule, TAKE 1 CAPSULE TWICE DAILY, Disp: 180 capsule, Rfl: 3     fluticasone (FLONASE) 50 MCG/ACT nasal spray, USE ONE TO TWO SPRAYS INTO BOTH NOSTRILS ONCE DAILY, Disp: 48 g, Rfl: 0     gabapentin (NEURONTIN) 800 MG tablet, 1 tab at bedtime., Disp: 90 tablet, Rfl: 1     hydrochlorothiazide (HYDRODIURIL) 25 MG tablet, Take 1 tablet (25 mg) by mouth every morning, Disp: 90 tablet, Rfl: 0     Lactobacillus (FLORAJEN ACIDOPHILUS) CAPS, Take 1 capsule by mouth daily, Disp: 90 capsule, Rfl: 3     lactobacillus rhamnosus, GG, (CULTURELL) capsule, Take 1 capsule by mouth daily, Disp: 90 capsule, Rfl: 11     lisinopril (ZESTRIL) 40 MG tablet,  TAKE 1 TABLET EVERY DAY, Disp: 90 tablet, Rfl: 0     nitroGLYcerin (NITROSTAT) 0.4 MG sublingual tablet, Place 1 tablet (0.4 mg) under the tongue every 5 minutes as needed for chest pain May repeat x 3. If no relief, call 911., Disp: 25 tablet, Rfl: 3     order for DME, Equipment being ordered: CPAP Cas Noriega received a Resmed AirSense 10 CPAP. Pressures were set at Auto 11 - 15 cm H2O., Disp: , Rfl:      ORDER FOR DME, one digital automatic Home blood pressure machine ( per insurance), Disp: 1 Device, Rfl: 0     traZODone (DESYREL) 100 MG tablet, Take 1 tablet (100 mg) by mouth At Bedtime, Disp: 90 tablet, Rfl: 1     zonisamide (ZONEGRAN) 25 MG capsule, Take 25 mg by mouth daily, Disp: , Rfl:       Allergies:   Nka [no known allergies]      Past Medical History:  Past Medical History:   Diagnosis Date     Actinic keratosis      Acute myocardial infarction of other inferior wall, episode of care unspecified      Arthritis 1992     CAD (coronary artery disease) 2/27/2013     COPD (chronic obstructive pulmonary disease) (H) 1994     Diverticulitis of small intestine (without mention of hemorrhage) 2003    recovered     Generalized anxiety disorder 4/28/2010     Hyperlipidemia LDL goal < 70      Hypertension goal BP (blood pressure) < 140/90      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  RLS      Prediabetes 5/16/2019     Restless legs syndrome (RLS)      Rotator cuff disorder 6/23/2010    H/o rotator cuff repair on right-6/05 L shoulder surgery pending-Dr. Hartley until insurance changes       S/P shoulder joint replacement 2/26/2013     Senile nuclear sclerosis 6/3/2012     Sensorineural hearing loss, bilateral 7/18/2012     Type 2 diabetes mellitus without complication, without long-term current use of insulin (H) 5/14/2020     Patient Active Problem List   Diagnosis     Essential hypertension     Acute myocardial infarction of other inferior wall, episode of care unspecified     Hyperlipidemia with target LDL less than 70     Restless legs syndrome (RLS)     Insomnia     Generalized anxiety disorder     Mild major depression (H)     Lumbago     Rotator cuff disorder     COPD (chronic obstructive pulmonary disease) (H)     Advance Care Planning     Senile nuclear sclerosis     Sensorineural hearing loss, bilateral     S/P shoulder joint replacement     CAD (coronary artery disease)     SEVERE MARTY (obstructive sleep apnea)     Infection of prosthetic shoulder joint (H)     Cochlear implant in place     Encounter for long-term (current) use of antibiotics     Obesity (BMI 35.0-39.9) with comorbidity (H)     Type 2 diabetes mellitus without complication, without long-term current use of insulin (H)     Low serum vitamin B12     Atrial flutter, unspecified type (H)     Chronic atrial fibrillation (H)        Past Surgical History:  Past Surgical History:   Procedure Laterality Date     ARTHROPLASTY SHOULDER  2/26/2013    Procedure: ARTHROPLASTY SHOULDER;  Left Total Shoulder Arthropasty;  Surgeon: Xu Snell MD;  Location: [facility]     BACK SURGERY  1992    l5-s1 decompression & fusion w/instermintation     BREAST SURGERY  1990    benigen lump rmvd l brest     CARDIAC SURGERY  2000     CHOLECYSTECTOMY, LAPOROSCOPIC  3/2007    Cholecystectomy, Laparoscopic     COLONOSCOPY  8/2009    Benign polyp     COLONOSCOPY N/A 3/25/2015    Procedure: COLONOSCOPY;  Surgeon: Deejay Marie MD;  Location: [facility] GI     IMPLANT COCHLEA WITH NERVE INTEGRITY MONITOR Right 12/17/2015    Procedure: IMPLANT COCHLEA  WITH NERVE INTEGRITY MONITOR;  Surgeon: Mickey Turpin MD;  Location: [facility]     IMPLANT COCHLEA WITH NERVE INTEGRITY MONITOR Left 3/27/2017    Procedure: IMPLANT COCHLEA WITH NERVE INTEGRITY MONITOR;  Surgeon: Mickey Turpin MD;  Location: [facility]     PHACOEMULSIFICATION WITH STANDARD INTRAOCULAR LENS IMPLANT  6/4/2012    Procedure:PHACOEMULSIFICATION WITH STANDARD INTRAOCULAR LENS IMPLANT; Left kelman phacoemulsification with intraocular lens implant; Surgeon:DAYDAY HILL; Location:[facility]     PHACOEMULSIFICATION WITH STANDARD INTRAOCULAR LENS IMPLANT  7/26/2012    Procedure: PHACOEMULSIFICATION WITH STANDARD INTRAOCULAR LENS IMPLANT;  Right Kelman phacoemulsification with intraocular lens implant;  Surgeon: Dayday Hill MD;  Location: [facility] OR     RELEASE CARPAL TUNNEL  10/23/2012    Procedure: RELEASE CARPAL TUNNEL;  Right carpal tunnel release;  Surgeon: Xu Snell MD;  Location: [facility]     RELEASE CARPAL TUNNEL  11/9/2012    Procedure: RELEASE CARPAL TUNNEL;  Left carpal tunnel release;  Surgeon: Xu Snell MD;  Location: [facility]     REMOVE HARDWARE ARTHROPLASTY SHOULDER  5/2/15    attempted and ended up doing bacteria removal     SURGICAL HISTORY OF -   3/1985    Bilateral Inguinal Herniorrhaphy     SURGICAL HISTORY OF -   5/1997    Breast Lumpectomy-Left-Benign     SURGICAL HISTORY OF -   6/10/2005    Rotator Cuff Repair     SURGICAL HISTORY OF -   10/2005    L5-S1 decompression and fusion with intrumentation     SURGICAL HISTORY OF -   2005    Right Shoulder Arthroplasty     SURGICAL HISTORY OF -   2006    PTCA with stent RCA     SURGICAL HISTORY OF -       Coronary Artery Bypass Graft     VASCULAR SURGERY  1992    2 stents b4 bypass       Family History:   Family History   Problem Relation Age of Onset     Hypertension Mother      Cancer Father         bone cancer     Diabetes Son         2015     Asthma No family hx of         1992     Obesity No family hx of          Social  History:   Social History     Tobacco Use     Smoking status: Former Smoker     Packs/day: 1.00     Years: 40.00     Pack years: 40.00     Types: Cigarettes     Start date: 1958     Quit date: 1998     Years since quittin.4     Smokeless tobacco: Never Used   Substance Use Topics     Alcohol use: Yes     Alcohol/week: 0.0 standard drinks     Comment: twice yearly     Drug use: No        Physical Exam:     Constitutional:  The patient was accompanied by a family member, well-groomed, and in no acute distress.     Skin: Normal:  warm and pink without rash   Neurologic: Alert and oriented x 3.  CN's III-XII within normal limits.  Voice normal.    Psychiatric: The patient's affect was calm, cooperative, and appropriate.     Communication:  Normal; communicates verbally, normal voice quality.   Respiratory: Breathing comfortably without stridor or exertion of accessory muscles.    Head/Face:  No lesions or scars. No sinus tenderness.    Salivary glands -  Normal size, no tenderness, swelling, or palpable masses   Eyes: Pupils were equal and reactive.  Extraocular movement intact.     Ears: Pinnae and tragus non-tender.  Very strong magnet on the right side, left side with significant indentation on the mastoid, device is palpable through healthy skin      Imagin2019 CT Brain WO Contrast:  Impression:  No acute intracranial abnormality is identified.     2015 CT Temporal Orbital sella w/o Contrast:  Impression:  1. Right temporal bone: no abnormality is identified.  2. Left temporal bone: no abnormality is identified.     Outside records from [facility] ENT and Audiology [facility] were independently reviewed.       Assessment and Plan:  Cas Noriega is a 76 year old male with a history of profound bilateral sensorineural hearing loss s/p right [employer] cochlear implant (2015- HR90K Advantage ms #[id number] ) and left [employer] cochlear implant (3/27/2017 by   Turpin - AB Harris Ultra high focus mid-airam electrode  #[id number]) s/p explant with reimplantation in [place], [place] due to recall (AB, unknown model, serial # [id number]) who has newly diagnosed focal seizures that may require further evaluation with an MRI. Mr. Noriega has initiated Keppra anti-epileptic medication and noticed a substantial decrease in the number of events, and will be meeting with his Neurologist in the next month to determine if the need for an MRI persists or if they may continue the course of treatment with his current AED. Mr. Noriega will be meeting with audiology next to discuss if the left processor may be replaced and to understand if the CI is working, and he will contact us after meeting with his Neurologist to determine if removal of the magnet is necessary.    We discussed the procedure for magnet removal and replacement following completion of the MRI (2 procedures) and the risks associated with magnet removal and later replacement, most notably that it may cause damage to the cochlear implant, lead to infection, and possible need for explantation. We will await word on whether both magnets would require removal or if this only impacts the right device that he feels he does not benefit as much from compared to the left.        Rachel Dash MD  Otolaryngology Head and Neck Surgery Resident  [facility]    Yovana Murguia MD  Otology & Neurotology  [facility]        Scribe Preparation Attestation:  Lydia COX, a scribe, prepared the chart for today's encounter.     Yovana COX MD, saw this patient with the resident/fellow and agree with the resident's findings and plan of care as documented in the resident's/fellow's note.

## 2022-05-24 NOTE — PATIENT INSTRUCTIONS
You were seen in the ENT Clinic today by Dr. Murguia. If you have any questions or concerns after your appointment, please contact us (see below)      2.   Please continue your followup plan with your neurology doctor. If the neurology doctor still needs the MRI, then please call or message us if you are needing to get surgery.       3.   Continue with your audiology appointment today        How to Contact Us:  Send a Flowity message to your provider. Our team will respond to you via Flowity. Occasionally, we will need to call you to get further information.  For urgent matters (Monday-Friday), call the ENT Clinic: [phone] and speak with a call center team member - they will route your call appropriately.   If you'd like to speak directly with a nurse, please find our contact information below. We do our best to check voicemail frequently throughout the day, and will work to call you back within 1-2 days. For urgent matters, please use the general clinic phone numbers listed above.      Galilea PRADHAN RN  ENT RN Care Coordinator  Direct: [phone]    Abbi KING LPN  Direct: [phone]

## 2022-05-24 NOTE — PROGRESS NOTES
Neurotology Clinic      Name: Cas Noriega  MRN: [id number]  Age: 76 year old  : 1945  Referring provider: Dr. Turpin  2022      Chief Complaint:   Consultation    History of Present Illness:   Cas Noriega is a 76 year old male with a history of bilateral sensorineural hearing loss s/p right [employer] cochlear implant (2015- HR90K Advantage ms #[id number] ) and left [employer] cochlear implant (3/27/2017 by Dr Turpin - AB Harris Ultra high focus mid-airam electrode  #[id number], V1) unfortunately his left device was part of an AB recall and he underwent explant with reimplantation in [place], [place] 1 year ago and his wife was told the new CI is also not MRI compatible (serial #: [id number], waiting on confirmation from AB so status not confirmed).   Mr. Noriega is being referred back to our department for evaluation for magnet removal due to concerns for focal epilepsy that may require MRI.    Per chart review of [facility] Neurology note by Dr. Love, Cas has had several seizures daily over the last three years and has been diagnosed with focal epilepsy with several recorded events. CT findings have been unremarkable and although there is low suspicion for malignancy, the ideal imaging modality would be an MRI.     The appointment was conducted using a laptop-based speech to text transcription service. The patient reports that his right cochlear implant makes a high squeaky sound so he is unable to understand the sounds he hears. He states he has had several issues with the left cochlear implant as the processor has been broken and other processors are not compatible. He was informed that [employer] was to email them and send over new processors and batteries, but he has not received any contact from them.     Cas presents with his wife and states that he has greatly improved since being put on Keppra, and if possible they would ideally avoid any surgery to  remove the magnet if it is not necessary. However, they will meet with the Neurologist in the next month to discuss if the MRI is needed or if they may stay on the current treatment plan.     Review of Systems:   Pertinent items are noted in HPI or as in patient entered ROS below, remainder of complete ROS is negative.   No flowsheet data found.     Active Medications:     Current Outpatient Medications:      albuterol (PROAIR HFA/PROVENTIL HFA/VENTOLIN HFA) 108 (90 Base) MCG/ACT inhaler, Inhale 2 puffs into the lungs every 6 hours as needed for shortness of breath / dyspnea or wheezing, Disp: 1 Inhaler, Rfl: 0     apixaban ANTICOAGULANT (ELIQUIS) 5 MG tablet, Take 1 tablet (5 mg) by mouth 2 times daily, Disp: 180 tablet, Rfl: 1     atenolol (TENORMIN) 50 MG tablet, TAKE 1 TABLET EVERY DAY, Disp: 90 tablet, Rfl: 0     atorvastatin (LIPITOR) 80 MG tablet, Take 1 tablet (80 mg) by mouth daily, Disp: 90 tablet, Rfl: 3     cefadroxil (DURICEF) 500 MG capsule, TAKE 1 CAPSULE TWICE DAILY, Disp: 180 capsule, Rfl: 3     fluticasone (FLONASE) 50 MCG/ACT nasal spray, USE ONE TO TWO SPRAYS INTO BOTH NOSTRILS ONCE DAILY, Disp: 48 g, Rfl: 0     gabapentin (NEURONTIN) 800 MG tablet, 1 tab at bedtime., Disp: 90 tablet, Rfl: 1     hydrochlorothiazide (HYDRODIURIL) 25 MG tablet, Take 1 tablet (25 mg) by mouth every morning, Disp: 90 tablet, Rfl: 0     Lactobacillus (FLORAJEN ACIDOPHILUS) CAPS, Take 1 capsule by mouth daily, Disp: 90 capsule, Rfl: 3     lactobacillus rhamnosus, GG, (CULTURELL) capsule, Take 1 capsule by mouth daily, Disp: 90 capsule, Rfl: 11     lisinopril (ZESTRIL) 40 MG tablet, TAKE 1 TABLET EVERY DAY, Disp: 90 tablet, Rfl: 0     nitroGLYcerin (NITROSTAT) 0.4 MG sublingual tablet, Place 1 tablet (0.4 mg) under the tongue every 5 minutes as needed for chest pain May repeat x 3. If no relief, call 911., Disp: 25 tablet, Rfl: 3     order for DME, Equipment being ordered: THUY Noriega received a Resmed  AirSense 10 CPAP. Pressures were set at Auto 11 - 15 cm H2O., Disp: , Rfl:      ORDER FOR DME, one digital automatic Home blood pressure machine ( per insurance), Disp: 1 Device, Rfl: 0     traZODone (DESYREL) 100 MG tablet, Take 1 tablet (100 mg) by mouth At Bedtime, Disp: 90 tablet, Rfl: 1     zonisamide (ZONEGRAN) 25 MG capsule, Take 25 mg by mouth daily, Disp: , Rfl:       Allergies:   Nka [no known allergies]      Past Medical History:  Past Medical History:   Diagnosis Date     Actinic keratosis      Acute myocardial infarction of other inferior wall, episode of care unspecified      Arthritis 1992     CAD (coronary artery disease) 2/27/2013     COPD (chronic obstructive pulmonary disease) (H) 1994     Diverticulitis of small intestine (without mention of hemorrhage) 2003    recovered     Generalized anxiety disorder 4/28/2010     Hyperlipidemia LDL goal < 70      Hypertension goal BP (blood pressure) < 140/90      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  RLS      Prediabetes 5/16/2019     Restless legs syndrome (RLS)      Rotator cuff disorder 6/23/2010    H/o rotator cuff repair on right-6/05 L shoulder surgery pending-Dr. Hartley until insurance changes      S/P shoulder joint replacement 2/26/2013     Senile nuclear sclerosis 6/3/2012     Sensorineural hearing loss, bilateral 7/18/2012     Type 2 diabetes mellitus without complication, without long-term current use of insulin (H) 5/14/2020     Patient Active Problem List   Diagnosis     Essential hypertension     Acute myocardial infarction  of other inferior wall, episode of care unspecified     Hyperlipidemia with target LDL less than 70     Restless legs syndrome (RLS)     Insomnia     Generalized anxiety disorder     Mild major depression (H)     Lumbago     Rotator cuff disorder     COPD (chronic obstructive pulmonary disease) (H)     Advance Care Planning     Senile nuclear sclerosis     Sensorineural hearing loss, bilateral     S/P shoulder joint replacement     CAD (coronary artery disease)     SEVERE MARTY (obstructive sleep apnea)     Infection of prosthetic shoulder joint (H)     Cochlear implant in place     Encounter for long-term (current) use of antibiotics     Obesity (BMI 35.0-39.9) with comorbidity (H)     Type 2 diabetes mellitus without complication, without long-term current use of insulin (H)     Low serum vitamin B12     Atrial flutter, unspecified type (H)     Chronic atrial fibrillation (H)        Past Surgical History:  Past Surgical History:   Procedure Laterality Date     ARTHROPLASTY SHOULDER  2/26/2013    Procedure: ARTHROPLASTY SHOULDER;  Left Total Shoulder Arthropasty;  Surgeon: Xu Snell MD;  Location: [facility] OR     BACK SURGERY  1992    l5-s1 decompression & fusion w/instermintation     BREAST SURGERY  1990    benigen lump rmvd l brest     CARDIAC SURGERY  2000     CHOLECYSTECTOMY, LAPOROSCOPIC  3/2007    Cholecystectomy, Laparoscopic     COLONOSCOPY  8/2009    Benign polyp     COLONOSCOPY N/A 3/25/2015    Procedure: COLONOSCOPY;  Surgeon: Deejay Marie MD;  Location: [facility] GI     IMPLANT COCHLEA WITH NERVE INTEGRITY MONITOR Right 12/17/2015    Procedure: IMPLANT COCHLEA WITH NERVE INTEGRITY MONITOR;  Surgeon: Mickey Turpin MD;  Location:  OR     IMPLANT COCHLEA WITH NERVE INTEGRITY MONITOR Left 3/27/2017    Procedure: IMPLANT COCHLEA WITH NERVE INTEGRITY MONITOR;  Surgeon: Mickey Turpin MD;  Location:  OR     PHACOEMULSIFICATION WITH STANDARD INTRAOCULAR LENS IMPLANT  6/4/2012     Procedure:PHACOEMULSIFICATION WITH STANDARD INTRAOCULAR LENS IMPLANT; Left kelman phacoemulsification with intraocular lens implant; Surgeon:DAYDAY HILL; Location:[facility] OR     PHACOEMULSIFICATION WITH STANDARD INTRAOCULAR LENS IMPLANT  2012    Procedure: PHACOEMULSIFICATION WITH STANDARD INTRAOCULAR LENS IMPLANT;  Right Kelman phacoemulsification with intraocular lens implant;  Surgeon: Dayday Hill MD;  Location: [facility] OR     RELEASE CARPAL TUNNEL  10/23/2012    Procedure: RELEASE CARPAL TUNNEL;  Right carpal tunnel release;  Surgeon: Xu Snell MD;  Location: [facility] OR     RELEASE CARPAL TUNNEL  2012    Procedure: RELEASE CARPAL TUNNEL;  Left carpal tunnel release;  Surgeon: Xu Snell MD;  Location: [facility] OR     REMOVE HARDWARE ARTHROPLASTY SHOULDER  5/2/15    attempted and ended up doing bacteria removal     SURGICAL HISTORY OF -   3/1985    Bilateral Inguinal Herniorrhaphy     SURGICAL HISTORY OF -   1997    Breast Lumpectomy-Left-Benign     SURGICAL HISTORY OF -   6/10/2005    Rotator Cuff Repair     SURGICAL HISTORY OF -   10/2005    L5-S1 decompression and fusion with intrumentation     SURGICAL HISTORY OF -       Right Shoulder Arthroplasty     SURGICAL HISTORY OF -       PTCA with stent RCA     SURGICAL HISTORY OF -       Coronary Artery Bypass Graft     VASCULAR SURGERY      2 stents b4 bypass       Family History:   Family History   Problem Relation Age of Onset     Hypertension Mother      Cancer Father         bone cancer     Diabetes Son         2015     Asthma No family hx of              Obesity No family hx of          Social History:   Social History     Tobacco Use     Smoking status: Former Smoker     Packs/day: 1.00     Years: 40.00     Pack years: 40.00     Types: Cigarettes     Start date: 1958     Quit date: 1998     Years since quittin.4     Smokeless tobacco: Never Used   Substance Use Topics     Alcohol use: Yes     Alcohol/week:  0.0 standard drinks     Comment: twice yearly     Drug use: No        Physical Exam:     Constitutional:  The patient was accompanied by a family member, well-groomed, and in no acute distress.     Skin: Normal:  warm and pink without rash   Neurologic: Alert and oriented x 3.  CN's III-XII within normal limits.  Voice normal.    Psychiatric: The patient's affect was calm, cooperative, and appropriate.     Communication:  Normal; communicates verbally, normal voice quality.   Respiratory: Breathing comfortably without stridor or exertion of accessory muscles.    Head/Face:  No lesions or scars. No sinus tenderness.    Salivary glands -  Normal size, no tenderness, swelling, or palpable masses   Eyes: Pupils were equal and reactive.  Extraocular movement intact.     Ears: Pinnae and tragus non-tender.  Very strong magnet on the right side, left side with significant indentation on the mastoid, device is palpable through healthy skin      Imagin2019 CT Brain WO Contrast:  Impression:  No acute intracranial abnormality is identified.     2015 CT Temporal Orbital sella w/o Contrast:  Impression:  1. Right temporal bone: no abnormality is identified.  2. Left temporal bone: no abnormality is identified.     Outside records from [facility] ENT and Audiology [facility] were independently reviewed.       Assessment and Plan:  Cas Noriega is a 76 year old male with a history of profound bilateral sensorineural hearing loss s/p right [employer] cochlear implant (2015- HR90K Advantage ms #[id number] ) and left [employer] cochlear implant (3/27/2017 by Dr Turpin - AB Harris Ultra high focus mid-airam electrode  #[id number]) s/p explant with reimplantation in [place], [place] due to recall (AB, unknown model, serial # [id number]) who has newly diagnosed focal seizures that may require further evaluation with an MRI. Mr. Noriega has initiated Keppra anti-epileptic medication and noticed a  substantial decrease in the number of events, and will be meeting with his Neurologist in the next month to determine if the need for an MRI persists or if they may continue the course of treatment with his current AED. Mr. Noriega will be meeting with audiology next to discuss if the left processor may be replaced and to understand if the CI is working, and he will contact us after meeting with his Neurologist to determine if removal of the magnet is necessary.    We discussed the procedure for magnet removal and replacement following completion of the MRI (2 procedures) and the risks associated with magnet removal and later replacement, most notably that it may cause damage to the cochlear implant, lead to infection, and possible need for explantation. We will await word on whether both magnets would require removal or if this only impacts the right device that he feels he does not benefit as much from compared to the left.        Rachel Dash MD  Otolaryngology Head and Neck Surgery Resident  [facility]    Yovana Murguia MD  Otology & Neurotology  [facility]        Scribe Preparation Attestation:  Lydia COX, a scribe, prepared the chart for today's encounter.     Yovana COX MD, saw this patient with the resident/fellow and agree with the resident's findings and plan of care as documented in the resident's/fellow's note.

## 2022-06-07 ENCOUNTER — TELEPHONE (OUTPATIENT)
Dept: FAMILY MEDICINE | Facility: CLINIC | Age: 77
End: 2022-06-07
Payer: COMMERCIAL

## 2022-06-07 NOTE — TELEPHONE ENCOUNTER
Patient Quality Outreach    Patient is due for the following:   Diabetes -  A1C, LDL (Fasting), Eye Exam and Microalbumin  Depression  -  PHQ-9 Needed  Physical  - Due after 03/20/2016    NEXT STEPS:   Schedule a yearly physical    Type of outreach:    Sent [employer] message.      Questions for provider review:    None     Christina Gil, [facility]

## 2022-06-18 ENCOUNTER — HEALTH MAINTENANCE LETTER (OUTPATIENT)
Age: 77
End: 2022-06-18

## 2022-07-20 ENCOUNTER — OFFICE VISIT (OUTPATIENT)
Dept: FAMILY MEDICINE | Facility: CLINIC | Age: 77
End: 2022-07-20
Payer: COMMERCIAL

## 2022-07-20 VITALS
WEIGHT: 245 LBS | SYSTOLIC BLOOD PRESSURE: 102 MMHG | DIASTOLIC BLOOD PRESSURE: 60 MMHG | TEMPERATURE: 99.2 F | OXYGEN SATURATION: 94 % | BODY MASS INDEX: 37.13 KG/M2 | RESPIRATION RATE: 20 BRPM | HEART RATE: 91 BPM | HEIGHT: 68 IN

## 2022-07-20 DIAGNOSIS — I48.92 ATRIAL FLUTTER, UNSPECIFIED TYPE (H): ICD-10-CM

## 2022-07-20 DIAGNOSIS — G47.33 OSA (OBSTRUCTIVE SLEEP APNEA): ICD-10-CM

## 2022-07-20 DIAGNOSIS — J44.89 ASTHMA WITH CHRONIC OBSTRUCTIVE PULMONARY DISEASE (COPD) (H): ICD-10-CM

## 2022-07-20 DIAGNOSIS — E66.01 MORBID OBESITY (H): ICD-10-CM

## 2022-07-20 DIAGNOSIS — F32.4 MAJOR DEPRESSIVE DISORDER IN PARTIAL REMISSION, UNSPECIFIED WHETHER RECURRENT (H): Primary | ICD-10-CM

## 2022-07-20 DIAGNOSIS — Z20.822 EXPOSURE TO COVID-19 VIRUS: ICD-10-CM

## 2022-07-20 DIAGNOSIS — E11.8 TYPE 2 DIABETES MELLITUS WITH COMPLICATION, WITHOUT LONG-TERM CURRENT USE OF INSULIN (H): ICD-10-CM

## 2022-07-20 PROCEDURE — U0003 INFECTIOUS AGENT DETECTION BY NUCLEIC ACID (DNA OR RNA); SEVERE ACUTE RESPIRATORY SYNDROME CORONAVIRUS 2 (SARS-COV-2) (CORONAVIRUS DISEASE [COVID-19]), AMPLIFIED PROBE TECHNIQUE, MAKING USE OF HIGH THROUGHPUT TECHNOLOGIES AS DESCRIBED BY CMS-2020-01-R: HCPCS | Performed by: FAMILY MEDICINE

## 2022-07-20 PROCEDURE — 99214 OFFICE O/P EST MOD 30 MIN: CPT | Performed by: FAMILY MEDICINE

## 2022-07-20 PROCEDURE — U0005 INFEC AGEN DETEC AMPLI PROBE: HCPCS | Performed by: FAMILY MEDICINE

## 2022-07-20 RX ORDER — LANOLIN ALCOHOL/MO/W.PET/CERES
1000 CREAM (GRAM) TOPICAL DAILY
COMMUNITY

## 2022-07-20 RX ORDER — LEVETIRACETAM 1000 MG/1
2000 TABLET ORAL 2 TIMES DAILY
COMMUNITY
Start: 2022-07-15 | End: 2023-07-15

## 2022-07-20 ASSESSMENT — PAIN SCALES - GENERAL: PAINLEVEL: NO PAIN (0)

## 2022-07-20 NOTE — PATIENT INSTRUCTIONS
I printed the order for the Cpap    Instructions for Patients  You may have been exposed to COVID-19. We recommend getting tested for COVID-19. You should also follow the quarantine guidelines in these instructions.     Please follow these steps:  Please sign-in to, or sign-up for, [employer] to fill out a symptom .   Once finished, you can schedule a COVID test online.  If you don t have [employer], please call [phone] ([phone]).  Our testing team will send you your test results, usually within 2 to 3 days. You can also see your results in [employer].   If you start to have symptoms, contact your care team for next steps.      What are the symptoms of COVID-19?  Symptoms can include fever, cough, shortness of breath, chills, headache, muscle pain sore throat, fatigue, runny or stuffy nose, and loss of taste and smell. Other less common symptoms include nausea, vomiting, or diarrhea (watery stools).    Know when to call 911. Emergency warning signs include:  Trouble breathing or shortness of breath  Pain or pressure in the chest that doesn't go away  Feeling confused like you haven't felt before, or not being able to wake up  Bluish-colored lips or face    How can I take care of myself?  Get lots of rest. Drink extra fluids (unless a doctor has told you not to).  Take Tylenol (acetaminophen) for fever or pain. If you have liver or kidney problems, ask your family doctor if it's okay to take Tylenol   Adults:   650 mg (two 325 mg pills or tablets) every 4 to 6 hours, or...   1,000 mg (two 500 mg pills or tablets) every 8 hours as needed.  Note: Don't take more than 3,000 mg in one day. Acetaminophen is found in many medicines (both prescribed and over the counter). Read all labels to be sure you don't take too much.  For children, check the Tylenol bottle for the right dose. The dose is based on the child's age or weight.  Take over the counter medicines for your symptoms as needed. Talk to your  pharmacist.  If you have other health problems (like cancer, heart failure, an organ transplant, or severe kidney disease): Call your specialty clinic if you don't feel better in the next 2 days.    These guidelines are for isolating and quarantining before returning to work, school or .   For employers, schools and day cares: This is an official notice for this person s medical guidelines for returning in-person.   For health care sites: The CDC gives different isolation and quarantine guidelines for healthcare sites, please check with these sites before arriving.     How do I self-isolate?  You isolate when you have symptoms of COVID or a test shows you have COVID, even if you don t have symptoms.   If you DO have symptoms:  Stay home and away from others  For at least 5 days after your symptoms started, AND   You are fever free for 24 hours (with no medicine that reduces fever), AND  Your other symptoms are better.  Wear a mask for 10 full days any time you are around others.  If you DON T have symptoms:  Stay at home and away from others for at least 5 days after your positive test.  Wear a mask for 10 full days any time you are around others.    How and when do I quarantine?  You quarantine when you may have been exposed to the virus and DON T have symptoms.   Stay home and away from others.   You must quarantine for 5 days after your last contact with a person who has COVID.  Note: If you are fully vaccinated, you don t need to quarantine. You should still follow the steps below.   Wear a mask for 10 full days any time you re around others.  Get tested at least 5 days after you were exposed, even if you don t have symptoms.   If you start to have symptoms, isolate right away and get tested.    Where can I get more information?  [facility] COVID-19 Resource Hub: [email or web address]   CDC Quarantine & Isolation:  https://www.cdc.gov/coronavirus/2019-ncov/your-health/quarantine-isolation.html   CDC - What to Do If You're Sick: https://www.cdc.gov/coronavirus/2019-ncov/if-you-are-sick/index.html  [facility] clinical trials (COVID-19 research studies): [employer]affairs[facility]/n-clinical-trials  [place] Department of Health COVID-19 Public Hotline: 1-736.849.7159

## 2022-07-20 NOTE — PROGRESS NOTES
"  Assessment & Plan       Major depressive disorder in partial remission, unspecified whether recurrent (H)  Seen by primary in [place] at [place] who is managing, well controlled with current medication trazodone.    Asthma with chronic obstructive pulmonary disease (COPD) (H)  Seen by primary in [facility] who is managing, well controlled with current medication    Atrial flutter, unspecified type (H)  Seen by primary in [facility] who is managing, well controlled with current medication on eliquis.    Morbid obesity (H)  BMI 37 with comorbidity, working on healthy eating and exercise.    Type 2 diabetes mellitus with complication, without long-term current use of insulin (H)  I hadn't realized the patient was diabetic and don't see that the recent labs in [place] were done for diabetes.  So will have to call back with need for a lab only appt.    SEVERE MARTY (obstructive sleep apnea)  Patient brings in his recent stats and is needed the renewal order to continue his CPAP yearly.  PATIENT IS USING AND BENEFITING FROM CPAP MACHINE.   - CPAP/Comprehensive Sleep Order  - CPAP/Comprehensive Sleep Order    Exposure to COVID-19 virus  Day 5-6 with no symptoms today  Discussed testing here or with rapid home test, he would like to do both   - Asymptomatic COVID-19 Virus (Coronavirus) by PCR     BMI:   Estimated body mass index is 37.25 kg/m  as calculated from the following:    Height as of this encounter: 1.727 m (5' 8\").    Weight as of this encounter: 111.1 kg (245 lb).   Weight management plan: Discussed healthy diet and exercise guidelines    See Patient Instructions    No follow-ups on file.    Rolando Barrientos MD  [facility]    Flaco Cross is a 76 year old accompanied by his spouse, presenting for the following health issues:  No chief complaint on file.    - Patient and wife were exposed to COVID on Friday x 5 days ago. Neither of them have any symptoms. Patient wants to know how " "to get tested. Patient would also like a COVID booster if possible.  Friday went to [place] to visit cousin and got a text Saturday some one in house testing positive for COVID.  Has runny nose symptoms ongoing.  No new symptoms.    HPI     Est Care.     Patient sees Dr. Love (Neurology) from [place] and Dr. Murguia (ENT) from [facility] and also sees a doctor from [place].    Patient states he gets his medications from [facility].      CPAP  Needing new supplies  DME (Durable Medical Equipment) Orders and Documentation  No orders of the defined types were placed in this encounter.     The patient was assessed and it was determined the patient is in need of the following listed DME Supplies/Equipment. Please complete supporting documentation below to demonstrate medical necessity.              Review of Systems   Constitutional, HEENT, cardiovascular, pulmonary, gi and gu systems are negative, except as otherwise noted.      Objective    /60   Pulse 91   Temp 99.2  F (37.3  C) (Tympanic)   Resp 20   Ht 1.727 m (5' 8\")   Wt 111.1 kg (245 lb)   SpO2 94%   BMI 37.25 kg/m    Body mass index is 37.25 kg/m .  Physical Exam   GENERAL: healthy, alert and no distress  RESP: lungs clear to auscultation - no rales, rhonchi or wheezes  CV: regular rate and rhythm, normal S1 S2, no S3 or S4, no murmur, click or rub, no peripheral edema and peripheral pulses strong  MS: no gross musculoskeletal defects noted, no edema                .  ..  DME (Durable Medical Equipment) Orders and Documentation  No orders of the defined types were placed in this encounter.     The patient was assessed and it was determined the patient is in need of the following listed DME Supplies/Equipment. Please complete supporting documentation below to demonstrate medical necessity.      DME All Other Item(s) Documentation    List reason for need and supporting documentation for medical necessity below for each DME item.     1. Severe " Sleep Apnea          DME (Durable Medical Equipment) Orders and Documentation  No orders of the defined types were placed in this encounter.     The patient was assessed and it was determined the patient is in need of the following listed DME Supplies/Equipment. Please complete supporting documentation below to demonstrate medical necessity.      DME All Other Item(s) Documentation    List reason for need and supporting documentation for medical necessity below for each DME item.     1. Severe Sleep Apnea

## 2022-07-21 LAB — SARS-COV-2 RNA RESP QL NAA+PROBE: NEGATIVE

## 2022-08-04 ENCOUNTER — PREP FOR PROCEDURE (OUTPATIENT)
Dept: OTOLARYNGOLOGY | Facility: CLINIC | Age: 77
End: 2022-08-04

## 2022-08-04 DIAGNOSIS — R56.9 SEIZURES (H): Primary | ICD-10-CM

## 2022-08-04 DIAGNOSIS — Z96.21 COCHLEAR IMPLANT IN PLACE: ICD-10-CM

## 2022-08-04 RX ORDER — CEFUROXIME SODIUM 1.5 G/16ML
1.5 INJECTION, POWDER, FOR SOLUTION INTRAVENOUS
Status: CANCELLED | OUTPATIENT
Start: 2022-08-04

## 2022-08-04 RX ORDER — CEFUROXIME SODIUM 1.5 G/16ML
1.5 INJECTION, POWDER, FOR SOLUTION INTRAVENOUS SEE ADMIN INSTRUCTIONS
Status: CANCELLED | OUTPATIENT
Start: 2022-08-04

## 2022-08-04 RX ORDER — DEXAMETHASONE SODIUM PHOSPHATE 4 MG/ML
10 INJECTION, SOLUTION INTRA-ARTICULAR; INTRALESIONAL; INTRAMUSCULAR; INTRAVENOUS; SOFT TISSUE ONCE
Status: CANCELLED | OUTPATIENT
Start: 2022-08-04 | End: 2022-08-04

## 2022-08-05 ENCOUNTER — VIRTUAL VISIT (OUTPATIENT)
Dept: OTOLARYNGOLOGY | Facility: CLINIC | Age: 77
End: 2022-08-05
Payer: COMMERCIAL

## 2022-08-05 ENCOUNTER — HOSPITAL ENCOUNTER (OUTPATIENT)
Facility: AMBULATORY SURGERY CENTER | Age: 77
End: 2022-08-05
Attending: OTOLARYNGOLOGY | Admitting: OTOLARYNGOLOGY
Payer: COMMERCIAL

## 2022-08-05 VITALS — WEIGHT: 245 LBS | HEIGHT: 68 IN | BODY MASS INDEX: 37.13 KG/M2

## 2022-08-05 DIAGNOSIS — Z96.21 COCHLEAR IMPLANT IN PLACE: Primary | ICD-10-CM

## 2022-08-05 DIAGNOSIS — R56.9 SEIZURES (H): ICD-10-CM

## 2022-08-05 DIAGNOSIS — H90.3 SENSORINEURAL HEARING LOSS, BILATERAL: ICD-10-CM

## 2022-08-05 PROCEDURE — 99213 OFFICE O/P EST LOW 20 MIN: CPT | Mod: TEL | Performed by: OTOLARYNGOLOGY

## 2022-08-05 NOTE — LETTER
8/5/2022       RE: Cas Noriega  [address]  [address]  [facility] [address]     Dear Colleague,    Thank you for referring your patient, Cas Noriega, to the [facility] [place] [facility]. Please see a copy of my visit note below.    Tay is a 77 year old who is being evaluated via a billable telephone visit.      What phone number would you like to be contacted at? Home phone  How would you like to obtain your AVS? Shyam     I had the pleasure of meeting with Cas Noriega and his wife today by telephone to discuss removal of the magnets from his cochlear implants.  He has been suffering from seizures and imaging performed and reviewed by his neurologist has raised concern for a lesion that is incompletely characterized due to the artifact from the cochlear implant magnets.  Thus, to move forward with his evaluation and potential treatment for a suspected tumor, the magnets need to be removed from both sides.  We reviewed this today on the phone and he and his wife are in agreement and wished to proceed with the magnet removal to facilitate the MRI.    We also reviewed that he had an MRI recently at [place].  While initially there was some tenderness over the right implant, the devices continue to work well and he has experienced no further pain or changes in device performance.    We discussed the procedure to remove the cochlear implant magnets.  If possible, a nonmagnetic spacer can be placed, depending on the findings of the time of surgery and potential duration of needing to keep the magnets out of the implant.  We discussed that making an incision and manipulating the implant does in parol the device and it is possible that he could develop device malfunction, damage to the implant body itself, infection that would necessitate device explantation, or other changes that may cause the implant to stop  "functioning or need to be replaced.  We discussed the need to keep the site clean and postop care as well as that the  stimulator will not be able to be maintained in position without the use of fixatives which will be reviewed and determined by his audiologist.  This means that he will not have hearing of any sort during the initial healing timeframe.  He would prefer to have the procedure done with IV sedation and local anesthetic to avoid a full general anesthetic but will do what his anesthesiologist feels is best.  He is on Eliquis and will need to connect with his healthcare provider who prescribed this regarding the ability to hold this prior to the procedure and determine the number of days that they feel would be safe for Eliquis cessation.  They asked excellent questions and at the conclusion of the procedure we reviewed the proposed date of August 17 at our ambulatory surgery center, pending approval, and also confirmed that he has an audiology appointment 1 week before that.  His wife indicated that they did not have further questions and that they would like to proceed and they will contact the [facility] regarding arrangements for the MRI scan once the surgical date is confirmed.    Yovana Murguia MD  Otology & Neurotology  [facility]      Phone call duration: 21 minutes            Chief Complaint   Patient presents with     RECHECK     Discuss surgery     Height 1.727 m (5' 8\"), weight 111.1 kg (245 lb).    Abbi Estes LPN        Again, thank you for allowing me to participate in the care of your patient.      Sincerely,    Yovana Murguia MD      "

## 2022-08-05 NOTE — PROGRESS NOTES
Tay is a 77 year old who is being evaluated via a billable telephone visit.      What phone number would you like to be contacted at? Home phone  How would you like to obtain your AVS? Shyam     I had the pleasure of meeting with Cas Noriega and his wife today by telephone to discuss removal of the magnets from his cochlear implants.  He has been suffering from seizures and imaging performed and reviewed by his neurologist has raised concern for a lesion that is incompletely characterized due to the artifact from the cochlear implant magnets.  Thus, to move forward with his evaluation and potential treatment for a suspected tumor, the magnets need to be removed from both sides.  We reviewed this today on the phone and he and his wife are in agreement and wished to proceed with the magnet removal to facilitate the MRI.    We also reviewed that he had an MRI recently at [place].  While initially there was some tenderness over the right implant, the devices continue to work well and he has experienced no further pain or changes in device performance.    We discussed the procedure to remove the cochlear implant magnets.  If possible, a nonmagnetic spacer can be placed, depending on the findings of the time of surgery and potential duration of needing to keep the magnets out of the implant.  We discussed that making an incision and manipulating the implant does in parol the device and it is possible that he could develop device malfunction, damage to the implant body itself, infection that would necessitate device explantation, or other changes that may cause the implant to stop functioning or need to be replaced.  We discussed the need to keep the site clean and postop care as well as that the  stimulator will not be able to be maintained in position without the use of fixatives which will be reviewed and determined by his audiologist.  This means that he will not have hearing of any sort during the  initial healing timeframe.  He would prefer to have the procedure done with IV sedation and local anesthetic to avoid a full general anesthetic but will do what his anesthesiologist feels is best.  He is on Eliquis and will need to connect with his healthcare provider who prescribed this regarding the ability to hold this prior to the procedure and determine the number of days that they feel would be safe for Eliquis cessation.  They asked excellent questions and at the conclusion of the procedure we reviewed the proposed date of August 17 at our ambulatory surgery center, pending approval, and also confirmed that he has an audiology appointment 1 week before that.  His wife indicated that they did not have further questions and that they would like to proceed and they will contact the [facility] regarding arrangements for the MRI scan once the surgical date is confirmed.    Yovana Murguia MD  Otology & Neurotology  [facility]      Phone call duration: 21 minutes

## 2022-08-05 NOTE — PROGRESS NOTES
"Chief Complaint   Patient presents with     RECHECK     Discuss surgery     Height 1.727 m (5' 8\"), weight 111.1 kg (245 lb).    Abbi Estes LPN    "

## 2022-08-08 ENCOUNTER — TELEPHONE (OUTPATIENT)
Dept: OTOLARYNGOLOGY | Facility: CLINIC | Age: 77
End: 2022-08-08

## 2022-08-08 DIAGNOSIS — Z96.21 COCHLEAR IMPLANT IN PLACE: Primary | ICD-10-CM

## 2022-08-08 NOTE — TELEPHONE ENCOUNTER
In clinic scheduled surgery with Dr. Murguia    Date of Surgery: 8/17    Location of surgery: [facility]    Pre-Op H&P: PCP    Imaging Scheduled:  Yes    Discussed COVID-19 Testing: Yes    Post-Op Appt Date: 3 weeks - none available    Surgery Packet Mailed: in clinic      Additional comments: CROW Brewster on 8/8/2022 at 9:13 AM

## 2022-08-09 NOTE — PROGRESS NOTES
[facility] [address] [address]  Phone: [phone]  Fax: [phone]  Primary Provider: Sera Garcia  Pre-op Performing Provider: RADHA DELGADO      PREOPERATIVE EVALUATION:  Today's date: 8/10/2022    Cas Noriega is a 77 year old male who presents for a preoperative evaluation.    Surgical Information:  Surgery/Procedure: Bilateral magnet removal from cochlear implants, placement of nonmagnetic plugs  Surgery Location: [facility] OR  Surgeon: Yovana Murguia MD  Surgery Date: 08/17/2022  Time of Surgery: 12:55pm   Where patient plans to recover: At home with family  Fax number for surgical facility: Note does not need to be faxed, will be available electronically in Epic.    Type of Anesthesia Anticipated: General    Assessment & Plan     The proposed surgical procedure is considered INTERMEDIATE risk.    Pre-op exam  Check EKG and labs.   - EKG 12-lead complete w/read - Clinics  - CBC with platelets; Future  - Comprehensive metabolic panel; Future    Type 2 diabetes mellitus with complication, without long-term current use of insulin (H)  Update A1C.  - Hemoglobin A1c; Future    History of myocardial infarction  S/P PCI and CABG in 2007 per chart review. Denies frequent chest pain or NTG use. EKG Atrial flutter with variable A-V block with premature ventricular or aberrantly conducted complexes. Non-specific ST abnormality and prolonged QT. EKG appears consistent with 2019 EKG.         Risks and Recommendations:  The patient has the following additional risks and recommendations for perioperative complications:   - Consult Hospitalist / IM to assist with post-op medical management  Cardiovascular:   - Awaiting EKG.  Diabetes:  - Patient is not on insulin therapy: regular NPO guidelines can be followed.   Pulmonary:    - Incentive spirometry post-op  Obstructive Sleep Apnea:   Uses CPAP  nightly.    Medication Instructions:  Patient is to take all scheduled medications on the day of surgery EXCEPT for modifications listed below:   - apixaban (Eliquis), edoxaban (Savaysa), rivaroxaban (Xarelto): Bleeding risk is moderate or high for this procedure AND CrCl  (>=) 50 mL/min. HOLD 2 days before surgery.    - ACE/ARB: HOLD due to exceptional risk of hypotension during surgery.    - Beta Blockers: Continue taking on the day of surgery.   - Diuretics: HOLD on the day of surgery.   - Statins: Continue taking on the day of surgery.    - sulfonylurea (e.g. glyburide, glipizide): HOLD day of surgery   - Antiepileptics: Continue without modification.   - pregabalin, gabapentin: Continue without modification.   - rescue Inhaler: Continue PRN. Bring to hospital on the day of surgery.    RECOMMENDATION:  APPROVAL GIVEN to proceed with proposed procedure pending review of diagnostic evaluation.    Review of external notes as documented above     Subjective     HPI related to upcoming procedure:  77-year-old male presents with wife for pre-op exam. PMH includes MARTY, Asthma, HTN, COPD, CAD(MI 30 years ago), Patient has been suffering from seizures. His neurologist raised concern for a lesion that is incompletely characterized due to the artifact from the cochlear implant magnets. To have an MRI completed, he will need the magnets from his cochlear implants removed.  He is scheduled for a Bilateral magnet removal from cochlear implants, placement of nonmagnetic plugs with Dr. Murguia on 8/17/22.       Hx MI- per chart review, history of percutaneous coroanry intervention and CABG in 2007 for multivessel disease. He had a stress test in 2019 which was negative for ischemia. Pt/wife deny frequent chest pain, palpitations, use of nitrogycerin. Pt has chronic cough and SOB which he attributes to his COPD. No other acute concerns/symptoms at time of exam.    Preop Questions 8/10/2022   1. Have you ever had a heart attack or  stroke? YES - 30 years ago according to wife   2. Have you ever had surgery on your heart or blood vessels, such as a stent placement, a coronary artery bypass, or surgery on an artery in your head, neck, heart, or legs? YES - 30 years ago   3. Do you have chest pain with activity? No   4. Do you have a history of  heart failure? No   5. Do you currently have a cold, bronchitis or symptoms of other infection? No   6. Do you have a cough, shortness of breath, or wheezing? YES - History of cough/SOB   7. Do you or anyone in your family have previous history of blood clots? No   8. Do you or does anyone in your family have a serious bleeding problem such as prolonged bleeding following surgeries or cuts? No   9. Have you ever had problems with anemia or been told to take iron pills? No   10. Have you had any abnormal blood loss such as black, tarry or bloody stools? No   11. Have you ever had a blood transfusion? No   12. Are you willing to have a blood transfusion if it is medically needed before, during, or after your surgery? Yes   13. Have you or any of your relatives ever had problems with anesthesia? No   14. Do you have sleep apnea, excessive snoring or daytime drowsiness? YES - CPAP nightly   14a. Do you have a CPAP machine? Yes   15. Do you have any artifical heart valves or other implanted medical devices like a pacemaker, defibrillator, or continuous glucose monitor? No   16. Do you have artificial joints? YES - Bilateral shoulder replacement   17. Are you allergic to latex? No     Health Care Directive:  Patient does not have a Health Care Directive or Living Will: Discussed advance care planning with patient; however, patient declined at this time.    Preoperative Review of :   reviewed - no record of controlled substances prescribed.      Status of Chronic Conditions:  CAD - Patient has a longstanding history of moderate-severe CAD. Patient denies recent chest pain or NTG use, denies exercise  induced dyspnea or PND. Last Stress test 2019- negative, EKG will be completed today.     COPD - Patient has a longstanding history of moderate-severe COPD . Patient has been doing well overall noting SOB and COUGH and continues on medication regimen consisting of BERKLEY prn without adverse reactions or side effects.    DEPRESSION - Patient has a long history of Depression of moderate severity requiring medication for control with recent symptoms being stable..Current symptoms of depression include none.     HYPERLIPIDEMIA - Patient has a long history of significant Hyperlipidemia requiring medication for treatment with recent good control. Patient reports no problems or side effects with the medication.     HYPERTENSION - Patient has longstanding history of HTN , currently denies any symptoms referable to elevated blood pressure. Specifically denies chest pain, palpitations, dyspnea, orthopnea, PND or peripheral edema. Blood pressure readings have been in normal range. Current medication regimen is as listed below. Patient denies any side effects of medication.     SLEEP PROBLEM - Patient has a longstanding history of MARTY.. Patient has tried OTC medications with limited success.  Uses CPAP nightly.    Review of Systems  CONSTITUTIONAL: NEGATIVE for fever, chills, change in weight  INTEGUMENTARY/SKIN: NEGATIVE for worrisome rashes, moles or lesions  EYES: NEGATIVE for vision changes or irritation  ENT/MOUTH: NEGATIVE for ear, mouth and throat problems  RESP: Positive for Chronic cough and SOB  CV: NEGATIVE for chest pain, palpitations or peripheral edema  GI: NEGATIVE for nausea, abdominal pain, heartburn, or change in bowel habits  : NEGATIVE for frequency, dysuria, or hematuria  MUSCULOSKELETAL: NEGATIVE for significant arthralgias or myalgia  NEURO: NEGATIVE for weakness, dizziness or paresthesias.  ENDOCRINE: NEGATIVE for temperature intolerance, skin/hair changes  HEME: NEGATIVE for bleeding  problems  PSYCHIATRIC: NEGATIVE for changes in mood or affect      Patient Active Problem List    Diagnosis Date Noted     Seizures (H) 08/05/2022     Priority: Medium     Added automatically from request for surgery [id number]       Major depressive disorder in partial remission, unspecified whether recurrent (H) 07/20/2022     Priority: Medium     Chronic atrial fibrillation (H) 07/01/2020     Priority: Medium     Started in hospital Nov 2019.  Seen again on June 2020 zio.       Low serum vitamin B12 06/01/2020     Priority: Medium     During hospitalization Dec 2019.  Normal May 2020.       Atrial flutter, unspecified type (H) 06/01/2020     Priority: Medium     A flutter and A fib noted during Dec 2019 hospitalization in [place].  Was to be on eliquis.  Unclear to me if this all could have been related to his FUO, question of sepsis.  Will have do zio and have addressed at July cardiology Follow-up.       Type 2 diabetes mellitus without complication, without long-term current use of insulin (H) 05/14/2020     Priority: Medium     Diagnosis 5/14/2020.       Obesity (BMI 35.0-39.9) with comorbidity (H) 05/16/2019     Priority: Medium     Encounter for long-term (current) use of antibiotics 01/12/2016     Priority: Medium     Life long for shoulder joint infection post op-duricef       Cochlear implant in place 12/22/2015     Priority: Medium     Cochlear implant placed on 12/17/15 by Dr. Dominguez Turpin: [facility], [id number].    Cochlear implant placed on 3/27/17 by Dr. Dominguez Turpin: [facility], #[id number].       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       SEVERE MARTY (obstructive sleep apnea) 01/07/2015     Priority: Medium     Ena [facility] 8/30/2005 AHI 46.8 Wes 80% CPAP       CAD (coronary artery disease) 02/27/2013     Priority: Medium     NM Lexiscan 8/10/2015: Small apical ischemia. Basal  inferior nontransmural infarct with mild geoffrey-infarct ischemia. EF 50% with stress.    Cardiac echocardiogram 5/7/2014: Preserved LV systolic function, estimated EF 60-65%. No WMA. Trace MR. Trace to mild TR.    History of stent and CABG 2007.  Saw sleep specialist April 2017, severe MARTY felt stable, with plan of recheck in 2 yrs.  No chest pains, chest pressures, SOB or sudden change of endurance.   Notes some panting if doing >1 flight of stairs, but this is not a change.         S/P shoulder joint replacement 02/26/2013     Priority: Medium     Sensorineural hearing loss, bilateral 07/18/2012     Priority: Medium     Senile nuclear sclerosis 06/03/2012     Priority: Medium     Advance Care Planning 05/21/2012     Priority: Medium     Advance Care Planning 11/25/2015: Receipt of ACP document:  Received: Health Care Directive which was witnessed or notarized on 12-19-12.  Document previously scanned on 11-10-15.  Validation form completed and sent to be scanned.  Code Status reflects choices in most recent ACP document.  Confirmed/documented designated decision maker(s).  Phone number needed for alternate health care agent.  Added by NORY HUGGINS  Discussed advance care planning with patient; information given to patient to review. 5/21/2012          COPD (chronic obstructive pulmonary disease) (H) 12/20/2010     Priority: Medium     Per hospitalization Dec 2019, [place] records.  Prev diagnosis intermittent asthma.       Rotator cuff disorder 06/23/2010     Priority: Medium     H/o rotator cuff repair on right-6/05  L shoulder surgery pending-Dr. Hartley until insurance changes       Generalized anxiety disorder 04/28/2010     Priority: Medium     Mild major depression (H) 04/28/2010     Priority: Medium     Essential hypertension      Priority: Medium     Acute myocardial infarction of other inferior wall, episode of care unspecified      Priority: Medium     2007 and 2008. He then had an inferior wall  myocardial infarction and was taken emergently to [facility] were her received 2 coronary stents. He was then scheduled for bypass surgery. Underwent multi-bypass surgery with unfortunately no old records available.       Hyperlipidemia with target LDL less than 70      Priority: Medium     Diagnosis updated by automated process. Provider to review and confirm.       Restless legs syndrome (RLS)      Priority: Medium     Insomnia      Priority: Medium     Lumbago      Priority: Medium     decrease feeling in r foot,,epidural by spinal surgeon, no improvement,-2nd and third and fourth, stand long periods of time, shower, numb in left hip/upper thigh, gets back ache if stands too long  Recommended medication, pt did not want to use, recommended additional surgery, pt wants to hold        Past Medical History:   Diagnosis Date     Actinic keratosis      Acute myocardial infarction of other inferior wall, episode of care unspecified      Arthritis 1992     CAD (coronary artery disease) 2/27/2013     COPD (chronic obstructive pulmonary disease) (H) 1994     Diverticulitis of small intestine (without mention of hemorrhage) 2003    recovered     Generalized anxiety disorder 4/28/2010     Hyperlipidemia LDL goal < 70      Hypertension goal BP (blood pressure) < 140/90      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  RLS      Prediabetes 5/16/2019     Restless legs syndrome (RLS)      Rotator cuff disorder 6/23/2010    H/o rotator cuff repair on right-6/05 L shoulder surgery pending-Dr. Hartley  until insurance changes      S/P shoulder joint replacement 2/26/2013     Senile nuclear sclerosis 6/3/2012     Sensorineural hearing loss, bilateral 7/18/2012     Type 2 diabetes mellitus without complication, without long-term current use of insulin (H) 5/14/2020     Past Surgical History:   Procedure Laterality Date     ARTHROPLASTY SHOULDER  2/26/2013    Procedure: ARTHROPLASTY SHOULDER;  Left Total Shoulder Arthropasty;  Surgeon: Xu Snell MD;  Location: [facility] OR     BACK SURGERY  1992    l5-s1 decompression & fusion w/instermintation     BREAST SURGERY  1990    benigen lump rmvd l brest     CARDIAC SURGERY  2000     CHOLECYSTECTOMY, LAPOROSCOPIC  3/2007    Cholecystectomy, Laparoscopic     COLONOSCOPY  8/2009    Benign polyp     COLONOSCOPY N/A 3/25/2015    Procedure: COLONOSCOPY;  Surgeon: Deejay Marie MD;  Location: [facility] GI     IMPLANT COCHLEA WITH NERVE INTEGRITY MONITOR Right 12/17/2015    Procedure: IMPLANT COCHLEA WITH NERVE INTEGRITY MONITOR;  Surgeon: Mickey Turpin MD;  Location: [facility] OR     IMPLANT COCHLEA WITH NERVE INTEGRITY MONITOR Left 3/27/2017    Procedure: IMPLANT COCHLEA WITH NERVE INTEGRITY MONITOR;  Surgeon: Mickey Turpin MD;  Location: [facility] OR     PHACOEMULSIFICATION WITH STANDARD INTRAOCULAR LENS IMPLANT  6/4/2012    Procedure:PHACOEMULSIFICATION WITH STANDARD INTRAOCULAR LENS IMPLANT; Left kelman phacoemulsification with intraocular lens implant; Surgeon:DAYDAY HILL; Location:[facility] OR     PHACOEMULSIFICATION WITH STANDARD INTRAOCULAR LENS IMPLANT  7/26/2012    Procedure: PHACOEMULSIFICATION WITH STANDARD INTRAOCULAR LENS IMPLANT;  Right Kelman phacoemulsification with intraocular lens implant;  Surgeon: Dayday Hill MD;  Location: [facility] OR     RELEASE CARPAL TUNNEL  10/23/2012    Procedure: RELEASE CARPAL TUNNEL;  Right carpal tunnel release;  Surgeon: Xu Snell MD;  Location: [facility] OR     RELEASE CARPAL TUNNEL  11/9/2012    Procedure: RELEASE CARPAL  TUNNEL;  Left carpal tunnel release;  Surgeon: Xu Snell MD;  Location: [facility] OR     REMOVE HARDWARE ARTHROPLASTY SHOULDER  5/2/15    attempted and ended up doing bacteria removal     SURGICAL HISTORY OF -   3/1985    Bilateral Inguinal Herniorrhaphy     SURGICAL HISTORY OF -   5/1997    Breast Lumpectomy-Left-Benign     SURGICAL HISTORY OF -   6/10/2005    Rotator Cuff Repair     SURGICAL HISTORY OF -   10/2005    L5-S1 decompression and fusion with intrumentation     SURGICAL HISTORY OF -   2005    Right Shoulder Arthroplasty     SURGICAL HISTORY OF -   2006    PTCA with stent RCA     SURGICAL HISTORY OF -       Coronary Artery Bypass Graft     VASCULAR SURGERY  1992    2 stents b4 bypass     Current Outpatient Medications   Medication Sig Dispense Refill     albuterol (PROAIR HFA/PROVENTIL HFA/VENTOLIN HFA) 108 (90 Base) MCG/ACT inhaler Inhale 2 puffs into the lungs every 6 hours as needed for shortness of breath / dyspnea or wheezing 1 Inhaler 0     apixaban ANTICOAGULANT (ELIQUIS) 5 MG tablet Take 1 tablet (5 mg) by mouth 2 times daily 180 tablet 1     atenolol (TENORMIN) 50 MG tablet TAKE 1 TABLET EVERY DAY 90 tablet 0     atorvastatin (LIPITOR) 80 MG tablet Take 1 tablet (80 mg) by mouth daily 90 tablet 3     cefadroxil (DURICEF) 500 MG capsule TAKE 1 CAPSULE TWICE DAILY (Patient taking differently: 1,000 mg TAKE 1 CAPSULE TWICE DAILY. Patient states he takes 4 capsules before a dental procedures or surgery.) 180 capsule 3     cyanocobalamin (VITAMIN B-12) 1000 MCG tablet Take 1,000 mcg by mouth daily One on Mondays, wednesdays, and Fridays       gabapentin (NEURONTIN) 800 MG tablet 1 tab at bedtime. 90 tablet 1     hydrochlorothiazide (HYDRODIURIL) 25 MG tablet Take 1 tablet (25 mg) by mouth every morning 90 tablet 0     Lactobacillus (FLORAJEN ACIDOPHILUS) CAPS Take 1 capsule by mouth daily 90 capsule 3     levETIRAcetam (KEPPRA) 1000 MG tablet Take 2,000 mg by mouth 2 times daily       lisinopril  "(ZESTRIL) 40 MG tablet TAKE 1 TABLET EVERY DAY 90 tablet 0     nitroGLYcerin (NITROSTAT) 0.4 MG sublingual tablet Place 1 tablet (0.4 mg) under the tongue every 5 minutes as needed for chest pain May repeat x 3. If no relief, call 911. 27 tablet 3     order for DME Equipment being ordered: CPAP Cas Noriega received a Resmed AirSense 10 CPAP. Pressures were set at Auto 11 - 15 cm H2O.       ORDER FOR DME one digital automatic Home blood pressure machine ( per insurance) 1 Device 0     traZODone (DESYREL) 100 MG tablet Take 1 tablet (100 mg) by mouth At Bedtime 90 tablet 1     zonisamide (ZONEGRAN) 25 MG capsule Take 25 mg by mouth daily       fluticasone (FLONASE) 50 MCG/ACT nasal spray USE ONE TO TWO SPRAYS INTO BOTH NOSTRILS ONCE DAILY (Patient not taking: No sig reported) 48 g 0       Allergies   Allergen Reactions     Nka [No Known Allergies]         Social History     Tobacco Use     Smoking status: Former Smoker     Packs/day: 1.00     Years: 40.00     Pack years: 40.00     Types: Cigarettes, Cigarettes     Start date: 1956     Quit date: 1998     Years since quittin.6     Smokeless tobacco: Never Used   Substance Use Topics     Alcohol use: Yes     Comment: twice yearly       History   Drug Use No         Objective     Ht 1.727 m (5' 8\")   Wt 111.1 kg (245 lb)   BMI 37.25 kg/m      Physical Exam    GENERAL APPEARANCE: healthy, alert and no distress     EYES: EOMI,  PERRL     HENT: Bilateral cochlear implants present.nose and mouth without ulcers or lesions     NECK: no adenopathy, no asymmetry, masses, or scars and thyroid normal to palpation     RESP: lungs clear to auscultation - no rales, rhonchi or wheezes     CV: regular rates and rhythm, normal S1 S2, no S3 or S4 and no murmur, click or rub     ABDOMEN:  soft, nontender, no HSM or masses and bowel sounds normal     MS: extremities normal- no gross deformities noted, no evidence of inflammation in joints, FROM in all extremities.     " SKIN: no suspicious lesions or rashes     NEURO: Normal strength and tone, sensory exam grossly normal, mentation intact and speech normal     PSYCH: mentation appears normal. and affect normal/bright     LYMPHATICS: No cervical adenopathy    No results for input(s): HGB, PLT, INR, NA, POTASSIUM, CR, A1C in the last 30002 hours.     Diagnostics:  Labs pending at this time.  Results will be reviewed when available.   EKG required for known coronary heart disease and not completed in the last 90 days.    Awaiting EKG results.    Revised Cardiac Risk Index (RCRI):  The patient has the following serious cardiovascular risks for perioperative complications:   - Coronary Artery Disease (MI, positive stress test, angina, Qs on EKG) = 1 point     RCRI Interpretation: 1 point: Class II (low risk - 0.9% complication rate)           Signed Electronically by: GEOFFREY Yousif CNP  Copy of this evaluation report is provided to requesting physician.

## 2022-08-09 NOTE — H&P (VIEW-ONLY)
[facility] [address] [address]  Phone: [phone]  Fax: [phone]  Primary Provider: Sera Garcia  Pre-op Performing Provider: RADHA DELGADO      PREOPERATIVE EVALUATION:  Today's date: 8/10/2022    Cas Noriega is a 77 year old male who presents for a preoperative evaluation.    Surgical Information:  Surgery/Procedure: Bilateral magnet removal from cochlear implants, placement of nonmagnetic plugs  Surgery Location: [facility]  Surgeon: Yovana Murguia MD  Surgery Date: 08/17/2022  Time of Surgery: 12:55pm   Where patient plans to recover: At home with family  Fax number for surgical facility: Note does not need to be faxed, will be available electronically in Epic.    Type of Anesthesia Anticipated: General    Assessment & Plan     The proposed surgical procedure is considered INTERMEDIATE risk.    Pre-op exam  Check EKG and labs.   - EKG 12-lead complete w/read - Clinics  - CBC with platelets; Future  - Comprehensive metabolic panel; Future    Type 2 diabetes mellitus with complication, without long-term current use of insulin (H)  Update A1C.  - Hemoglobin A1c; Future    History of myocardial infarction  S/P PCI and CABG in 2007 per chart review. Denies frequent chest pain or NTG use. EKG Atrial flutter with variable A-V block with premature ventricular or aberrantly conducted complexes. Non-specific ST abnormality and prolonged QT. EKG appears consistent with 2019 EKG.         Risks and Recommendations:  The patient has the following additional risks and recommendations for perioperative complications:   - Consult Hospitalist / IM to assist with post-op medical management  Cardiovascular:   - Awaiting EKG.  Diabetes:  - Patient is not on insulin therapy: regular NPO guidelines can be followed.   Pulmonary:    - Incentive spirometry post-op  Obstructive Sleep Apnea:   Uses CPAP  nightly.    Medication Instructions:  Patient is to take all scheduled medications on the day of surgery EXCEPT for modifications listed below:   - apixaban (Eliquis), edoxaban (Savaysa), rivaroxaban (Xarelto): Bleeding risk is moderate or high for this procedure AND CrCl  (>=) 50 mL/min. HOLD 2 days before surgery.    - ACE/ARB: HOLD due to exceptional risk of hypotension during surgery.    - Beta Blockers: Continue taking on the day of surgery.   - Diuretics: HOLD on the day of surgery.   - Statins: Continue taking on the day of surgery.    - sulfonylurea (e.g. glyburide, glipizide): HOLD day of surgery   - Antiepileptics: Continue without modification.   - pregabalin, gabapentin: Continue without modification.   - rescue Inhaler: Continue PRN. Bring to hospital on the day of surgery.    RECOMMENDATION:  APPROVAL GIVEN to proceed with proposed procedure pending review of diagnostic evaluation.    Review of external notes as documented above     Subjective     HPI related to upcoming procedure:  77-year-old male presents with wife for pre-op exam. PMH includes MARTY, Asthma, HTN, COPD, CAD(MI 30 years ago), Patient has been suffering from seizures. His neurologist raised concern for a lesion that is incompletely characterized due to the artifact from the cochlear implant magnets. To have an MRI completed, he will need the magnets from his cochlear implants removed.  He is scheduled for a Bilateral magnet removal from cochlear implants, placement of nonmagnetic plugs with Dr. Murguia on 8/17/22.       Hx MI- per chart review, history of percutaneous coroanry intervention and CABG in 2007 for multivessel disease. He had a stress test in 2019 which was negative for ischemia. Pt/wife deny frequent chest pain, palpitations, use of nitrogycerin. Pt has chronic cough and SOB which he attributes to his COPD. No other acute concerns/symptoms at time of exam.    Preop Questions 8/10/2022   1. Have you ever had a heart attack or  stroke? YES - 30 years ago according to wife   2. Have you ever had surgery on your heart or blood vessels, such as a stent placement, a coronary artery bypass, or surgery on an artery in your head, neck, heart, or legs? YES - 30 years ago   3. Do you have chest pain with activity? No   4. Do you have a history of  heart failure? No   5. Do you currently have a cold, bronchitis or symptoms of other infection? No   6. Do you have a cough, shortness of breath, or wheezing? YES - History of cough/SOB   7. Do you or anyone in your family have previous history of blood clots? No   8. Do you or does anyone in your family have a serious bleeding problem such as prolonged bleeding following surgeries or cuts? No   9. Have you ever had problems with anemia or been told to take iron pills? No   10. Have you had any abnormal blood loss such as black, tarry or bloody stools? No   11. Have you ever had a blood transfusion? No   12. Are you willing to have a blood transfusion if it is medically needed before, during, or after your surgery? Yes   13. Have you or any of your relatives ever had problems with anesthesia? No   14. Do you have sleep apnea, excessive snoring or daytime drowsiness? YES - CPAP nightly   14a. Do you have a CPAP machine? Yes   15. Do you have any artifical heart valves or other implanted medical devices like a pacemaker, defibrillator, or continuous glucose monitor? No   16. Do you have artificial joints? YES - Bilateral shoulder replacement   17. Are you allergic to latex? No     Health Care Directive:  Patient does not have a Health Care Directive or Living Will: Discussed advance care planning with patient; however, patient declined at this time.    Preoperative Review of :   reviewed - no record of controlled substances prescribed.      Status of Chronic Conditions:  CAD - Patient has a longstanding history of moderate-severe CAD. Patient denies recent chest pain or NTG use, denies exercise  induced dyspnea or PND. Last Stress test 2019- negative, EKG will be completed today.     COPD - Patient has a longstanding history of moderate-severe COPD . Patient has been doing well overall noting SOB and COUGH and continues on medication regimen consisting of BERKLEY prn without adverse reactions or side effects.    DEPRESSION - Patient has a long history of Depression of moderate severity requiring medication for control with recent symptoms being stable..Current symptoms of depression include none.     HYPERLIPIDEMIA - Patient has a long history of significant Hyperlipidemia requiring medication for treatment with recent good control. Patient reports no problems or side effects with the medication.     HYPERTENSION - Patient has longstanding history of HTN , currently denies any symptoms referable to elevated blood pressure. Specifically denies chest pain, palpitations, dyspnea, orthopnea, PND or peripheral edema. Blood pressure readings have been in normal range. Current medication regimen is as listed below. Patient denies any side effects of medication.     SLEEP PROBLEM - Patient has a longstanding history of MARTY.. Patient has tried OTC medications with limited success.  Uses CPAP nightly.    Review of Systems  CONSTITUTIONAL: NEGATIVE for fever, chills, change in weight  INTEGUMENTARY/SKIN: NEGATIVE for worrisome rashes, moles or lesions  EYES: NEGATIVE for vision changes or irritation  ENT/MOUTH: NEGATIVE for ear, mouth and throat problems  RESP: Positive for Chronic cough and SOB  CV: NEGATIVE for chest pain, palpitations or peripheral edema  GI: NEGATIVE for nausea, abdominal pain, heartburn, or change in bowel habits  : NEGATIVE for frequency, dysuria, or hematuria  MUSCULOSKELETAL: NEGATIVE for significant arthralgias or myalgia  NEURO: NEGATIVE for weakness, dizziness or paresthesias.  ENDOCRINE: NEGATIVE for temperature intolerance, skin/hair changes  HEME: NEGATIVE for bleeding  problems  PSYCHIATRIC: NEGATIVE for changes in mood or affect      Patient Active Problem List    Diagnosis Date Noted     Seizures (H) 08/05/2022     Priority: Medium     Added automatically from request for surgery [id number]       Major depressive disorder in partial remission, unspecified whether recurrent (H) 07/20/2022     Priority: Medium     Chronic atrial fibrillation (H) 07/01/2020     Priority: Medium     Started in hospital Nov 2019.  Seen again on June 2020 zio.       Low serum vitamin B12 06/01/2020     Priority: Medium     During hospitalization Dec 2019.  Normal May 2020.       Atrial flutter, unspecified type (H) 06/01/2020     Priority: Medium     A flutter and A fib noted during Dec 2019 hospitalization in [place].  Was to be on eliquis.  Unclear to me if this all could have been related to his FUO, question of sepsis.  Will have do zio and have addressed at July cardiology Follow-up.       Type 2 diabetes mellitus without complication, without long-term current use of insulin (H) 05/14/2020     Priority: Medium     Diagnosis 5/14/2020.       Obesity (BMI 35.0-39.9) with comorbidity (H) 05/16/2019     Priority: Medium     Encounter for long-term (current) use of antibiotics 01/12/2016     Priority: Medium     Life long for shoulder joint infection post op-duricef       Cochlear implant in place 12/22/2015     Priority: Medium     Cochlear implant placed on 12/17/15 by Dr. Dominguez Turpin: [facility], [id number].    Cochlear implant placed on 3/27/17 by Dr. Dominguez Turpin: [id number]       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       SEVERE MARTY (obstructive sleep apnea) 01/07/2015     Priority: Medium     [facility]na [facility] 8/30/2005 AHI 46.8 Wes 80% CPAP       CAD (coronary artery disease) 02/27/2013     Priority: Medium     NM Lexiscan 8/10/2015: Small apical ischemia. Basal  inferior nontransmural infarct with mild geoffrey-infarct ischemia. EF 50% with stress.    Cardiac echocardiogram 5/7/2014: Preserved LV systolic function, estimated EF 60-65%. No WMA. Trace MR. Trace to mild TR.    History of stent and CABG 2007.  Saw sleep specialist April 2017, severe MARTY felt stable, with plan of recheck in 2 yrs.  No chest pains, chest pressures, SOB or sudden change of endurance.   Notes some panting if doing >1 flight of stairs, but this is not a change.         S/P shoulder joint replacement 02/26/2013     Priority: Medium     Sensorineural hearing loss, bilateral 07/18/2012     Priority: Medium     Senile nuclear sclerosis 06/03/2012     Priority: Medium     Advance Care Planning 05/21/2012     Priority: Medium     Advance Care Planning 11/25/2015: Receipt of ACP document:  Received: Health Care Directive which was witnessed or notarized on 12-19-12.  Document previously scanned on 11-10-15.  Validation form completed and sent to be scanned.  Code Status reflects choices in most recent ACP document.  Confirmed/documented designated decision maker(s).  Phone number needed for alternate health care agent.  Added by NORY HUGGINS  Discussed advance care planning with patient; information given to patient to review. 5/21/2012          COPD (chronic obstructive pulmonary disease) (H) 12/20/2010     Priority: Medium     Per hospitalization Dec 2019, [place] records.  Prev diagnosis intermittent asthma.       Rotator cuff disorder 06/23/2010     Priority: Medium     H/o rotator cuff repair on right-6/05  L shoulder surgery pending-Dr. Hartley until insurance changes       Generalized anxiety disorder 04/28/2010     Priority: Medium     Mild major depression (H) 04/28/2010     Priority: Medium     Essential hypertension      Priority: Medium     Acute myocardial infarction of other inferior wall, episode of care unspecified      Priority: Medium     2007 and 2008. He then had an inferior wall  myocardial infarction and was taken emergently to [facility] were her received 2 coronary stents. He was then scheduled for bypass surgery. Underwent multi-bypass surgery with unfortunately no old records available.       Hyperlipidemia with target LDL less than 70      Priority: Medium     Diagnosis updated by automated process. Provider to review and confirm.       Restless legs syndrome (RLS)      Priority: Medium     Insomnia      Priority: Medium     Lumbago      Priority: Medium     decrease feeling in r foot,,epidural by spinal surgeon, no improvement,-2nd and third and fourth, stand long periods of time, shower, numb in left hip/upper thigh, gets back ache if stands too long  Recommended medication, pt did not want to use, recommended additional surgery, pt wants to hold        Past Medical History:   Diagnosis Date     Actinic keratosis      Acute myocardial infarction of other inferior wall, episode of care unspecified      Arthritis 1992     CAD (coronary artery disease) 2/27/2013     COPD (chronic obstructive pulmonary disease) (H) 1994     Diverticulitis of small intestine (without mention of hemorrhage) 2003    recovered     Generalized anxiety disorder 4/28/2010     Hyperlipidemia LDL goal < 70      Hypertension goal BP (blood pressure) < 140/90      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  RLS      Prediabetes 5/16/2019     Restless legs syndrome (RLS)      Rotator cuff disorder 6/23/2010    H/o rotator cuff repair on right-6/05 L shoulder surgery pending-Dr. Hartley  until insurance changes      S/P shoulder joint replacement 2/26/2013     Senile nuclear sclerosis 6/3/2012     Sensorineural hearing loss, bilateral 7/18/2012     Type 2 diabetes mellitus without complication, without long-term current use of insulin (H) 5/14/2020     Past Surgical History:   Procedure Laterality Date     ARTHROPLASTY SHOULDER  2/26/2013    Procedure: ARTHROPLASTY SHOULDER;  Left Total Shoulder Arthropasty;  Surgeon: Xu Snell MD;  Location: [facility] OR     BACK SURGERY  1992    l5-s1 decompression & fusion w/instermintation     BREAST SURGERY  1990    benigen lump rmvd l brest     CARDIAC SURGERY  2000     CHOLECYSTECTOMY, LAPOROSCOPIC  3/2007    Cholecystectomy, Laparoscopic     COLONOSCOPY  8/2009    Benign polyp     COLONOSCOPY N/A 3/25/2015    Procedure: COLONOSCOPY;  Surgeon: Deejay Marie MD;  Location: [facility] GI     IMPLANT COCHLEA WITH NERVE INTEGRITY MONITOR Right 12/17/2015    Procedure: IMPLANT COCHLEA WITH NERVE INTEGRITY MONITOR;  Surgeon: Mickey Turpin MD;  Location: [facility]     IMPLANT COCHLEA WITH NERVE INTEGRITY MONITOR Left 3/27/2017    Procedure: IMPLANT COCHLEA WITH NERVE INTEGRITY MONITOR;  Surgeon: Mickey Turpin MD;  Location: [facility] OR     PHACOEMULSIFICATION WITH STANDARD INTRAOCULAR LENS IMPLANT  6/4/2012    Procedure:PHACOEMULSIFICATION WITH STANDARD INTRAOCULAR LENS IMPLANT; Left kelman phacoemulsification with intraocular lens implant; Surgeon:DAYDAY HILL; Location:[facility] OR     PHACOEMULSIFICATION WITH STANDARD INTRAOCULAR LENS IMPLANT  7/26/2012    Procedure: PHACOEMULSIFICATION WITH STANDARD INTRAOCULAR LENS IMPLANT;  Right Kelman phacoemulsification with intraocular lens implant;  Surgeon: Dayday Hill MD;  Location: [facility] OR     RELEASE CARPAL TUNNEL  10/23/2012    Procedure: RELEASE CARPAL TUNNEL;  Right carpal tunnel release;  Surgeon: Xu Snell MD;  Location: [facility]     RELEASE CARPAL TUNNEL  11/9/2012    Procedure: RELEASE CARPAL  TUNNEL;  Left carpal tunnel release;  Surgeon: Xu Snell MD;  Location: [facility]     REMOVE HARDWARE ARTHROPLASTY SHOULDER  5/2/15    attempted and ended up doing bacteria removal     SURGICAL HISTORY OF -   3/1985    Bilateral Inguinal Herniorrhaphy     SURGICAL HISTORY OF -   5/1997    Breast Lumpectomy-Left-Benign     SURGICAL HISTORY OF -   6/10/2005    Rotator Cuff Repair     SURGICAL HISTORY OF -   10/2005    L5-S1 decompression and fusion with intrumentation     SURGICAL HISTORY OF -   2005    Right Shoulder Arthroplasty     SURGICAL HISTORY OF -   2006    PTCA with stent RCA     SURGICAL HISTORY OF -       Coronary Artery Bypass Graft     VASCULAR SURGERY  1992    2 stents b4 bypass     Current Outpatient Medications   Medication Sig Dispense Refill     albuterol (PROAIR HFA/PROVENTIL HFA/VENTOLIN HFA) 108 (90 Base) MCG/ACT inhaler Inhale 2 puffs into the lungs every 6 hours as needed for shortness of breath / dyspnea or wheezing 1 Inhaler 0     apixaban ANTICOAGULANT (ELIQUIS) 5 MG tablet Take 1 tablet (5 mg) by mouth 2 times daily 180 tablet 1     atenolol (TENORMIN) 50 MG tablet TAKE 1 TABLET EVERY DAY 90 tablet 0     atorvastatin (LIPITOR) 80 MG tablet Take 1 tablet (80 mg) by mouth daily 90 tablet 3     cefadroxil (DURICEF) 500 MG capsule TAKE 1 CAPSULE TWICE DAILY (Patient taking differently: 1,000 mg TAKE 1 CAPSULE TWICE DAILY. Patient states he takes 4 capsules before a dental procedures or surgery.) 180 capsule 3     cyanocobalamin (VITAMIN B-12) 1000 MCG tablet Take 1,000 mcg by mouth daily One on Mondays, wednesdays, and Fridays       gabapentin (NEURONTIN) 800 MG tablet 1 tab at bedtime. 90 tablet 1     hydrochlorothiazide (HYDRODIURIL) 25 MG tablet Take 1 tablet (25 mg) by mouth every morning 90 tablet 0     Lactobacillus (FLORAJEN ACIDOPHILUS) CAPS Take 1 capsule by mouth daily 90 capsule 3     levETIRAcetam (KEPPRA) 1000 MG tablet Take 2,000 mg by mouth 2 times daily       lisinopril  "(ZESTRIL) 40 MG tablet TAKE 1 TABLET EVERY DAY 90 tablet 0     nitroGLYcerin (NITROSTAT) 0.4 MG sublingual tablet Place 1 tablet (0.4 mg) under the tongue every 5 minutes as needed for chest pain May repeat x 3. If no relief, call 911. 69 tablet 3     order for DME Equipment being ordered: CPAP Cas Noriega received a Resmed AirSense 10 CPAP. Pressures were set at Auto 11 - 15 cm H2O.       ORDER FOR DME one digital automatic Home blood pressure machine ( per insurance) 1 Device 0     traZODone (DESYREL) 100 MG tablet Take 1 tablet (100 mg) by mouth At Bedtime 90 tablet 1     zonisamide (ZONEGRAN) 25 MG capsule Take 25 mg by mouth daily       fluticasone (FLONASE) 50 MCG/ACT nasal spray USE ONE TO TWO SPRAYS INTO BOTH NOSTRILS ONCE DAILY (Patient not taking: No sig reported) 48 g 0       Allergies   Allergen Reactions     Nka [No Known Allergies]         Social History     Tobacco Use     Smoking status: Former Smoker     Packs/day: 1.00     Years: 40.00     Pack years: 40.00     Types: Cigarettes, Cigarettes     Start date: 1956     Quit date: 1998     Years since quittin.6     Smokeless tobacco: Never Used   Substance Use Topics     Alcohol use: Yes     Comment: twice yearly       History   Drug Use No         Objective     Ht 1.727 m (5' 8\")   Wt 111.1 kg (245 lb)   BMI 37.25 kg/m      Physical Exam    GENERAL APPEARANCE: healthy, alert and no distress     EYES: EOMI,  PERRL     HENT: Bilateral cochlear implants present.nose and mouth without ulcers or lesions     NECK: no adenopathy, no asymmetry, masses, or scars and thyroid normal to palpation     RESP: lungs clear to auscultation - no rales, rhonchi or wheezes     CV: regular rates and rhythm, normal S1 S2, no S3 or S4 and no murmur, click or rub     ABDOMEN:  soft, nontender, no HSM or masses and bowel sounds normal     MS: extremities normal- no gross deformities noted, no evidence of inflammation in joints, FROM in all extremities.     " SKIN: no suspicious lesions or rashes     NEURO: Normal strength and tone, sensory exam grossly normal, mentation intact and speech normal     PSYCH: mentation appears normal. and affect normal/bright     LYMPHATICS: No cervical adenopathy    No results for input(s): HGB, PLT, INR, NA, POTASSIUM, CR, A1C in the last 50978 hours.     Diagnostics:  Labs pending at this time.  Results will be reviewed when available.   EKG required for known coronary heart disease and not completed in the last 90 days.    Awaiting EKG results.    Revised Cardiac Risk Index (RCRI):  The patient has the following serious cardiovascular risks for perioperative complications:   - Coronary Artery Disease (MI, positive stress test, angina, Qs on EKG) = 1 point     RCRI Interpretation: 1 point: Class II (low risk - 0.9% complication rate)           Signed Electronically by: GEOFFREY Yousif CNP  Copy of this evaluation report is provided to requesting physician.

## 2022-08-10 ENCOUNTER — DOCUMENTATION ONLY (OUTPATIENT)
Dept: LAB | Facility: CLINIC | Age: 77
End: 2022-08-10

## 2022-08-10 ENCOUNTER — OFFICE VISIT (OUTPATIENT)
Dept: FAMILY MEDICINE | Facility: CLINIC | Age: 77
End: 2022-08-10
Payer: COMMERCIAL

## 2022-08-10 ENCOUNTER — LAB (OUTPATIENT)
Dept: LAB | Facility: CLINIC | Age: 77
End: 2022-08-10
Payer: COMMERCIAL

## 2022-08-10 ENCOUNTER — OFFICE VISIT (OUTPATIENT)
Dept: AUDIOLOGY | Facility: CLINIC | Age: 77
End: 2022-08-10
Payer: COMMERCIAL

## 2022-08-10 VITALS
HEIGHT: 68 IN | OXYGEN SATURATION: 98 % | BODY MASS INDEX: 37.13 KG/M2 | TEMPERATURE: 98.2 F | DIASTOLIC BLOOD PRESSURE: 74 MMHG | WEIGHT: 245 LBS | SYSTOLIC BLOOD PRESSURE: 124 MMHG | HEART RATE: 81 BPM

## 2022-08-10 DIAGNOSIS — Z01.818 PRE-OP EXAM: ICD-10-CM

## 2022-08-10 DIAGNOSIS — E11.8 TYPE 2 DIABETES MELLITUS WITH COMPLICATION, WITHOUT LONG-TERM CURRENT USE OF INSULIN (H): ICD-10-CM

## 2022-08-10 DIAGNOSIS — I25.2 HISTORY OF MYOCARDIAL INFARCTION: ICD-10-CM

## 2022-08-10 DIAGNOSIS — Z01.818 PRE-OP EXAM: Primary | ICD-10-CM

## 2022-08-10 DIAGNOSIS — H90.3 SENSORY HEARING LOSS, BILATERAL: Primary | ICD-10-CM

## 2022-08-10 LAB
ALBUMIN SERPL-MCNC: 4 G/DL (ref 3.4–5)
ALP SERPL-CCNC: 79 U/L (ref 40–150)
ALT SERPL W P-5'-P-CCNC: 47 U/L (ref 0–70)
ANION GAP SERPL CALCULATED.3IONS-SCNC: 8 MMOL/L (ref 3–14)
AST SERPL W P-5'-P-CCNC: 34 U/L (ref 0–45)
BILIRUB SERPL-MCNC: 0.6 MG/DL (ref 0.2–1.3)
BUN SERPL-MCNC: 9 MG/DL (ref 7–30)
CALCIUM SERPL-MCNC: 9.4 MG/DL (ref 8.5–10.1)
CHLORIDE BLD-SCNC: 107 MMOL/L (ref 94–109)
CHOLEST SERPL-MCNC: 129 MG/DL
CO2 SERPL-SCNC: 29 MMOL/L (ref 20–32)
CREAT SERPL-MCNC: 0.73 MG/DL (ref 0.66–1.25)
CREAT UR-MCNC: 95 MG/DL
ERYTHROCYTE [DISTWIDTH] IN BLOOD BY AUTOMATED COUNT: 12.9 % (ref 10–15)
FASTING STATUS PATIENT QL REPORTED: ABNORMAL
GFR SERPL CREATININE-BSD FRML MDRD: >90 ML/MIN/1.73M2
GLUCOSE BLD-MCNC: 86 MG/DL (ref 70–99)
HBA1C MFR BLD: 5.8 % (ref 0–5.6)
HCT VFR BLD AUTO: 49.7 % (ref 40–53)
HDLC SERPL-MCNC: 31 MG/DL
HGB BLD-MCNC: 16.8 G/DL (ref 13.3–17.7)
LDLC SERPL CALC-MCNC: 74 MG/DL
MCH RBC QN AUTO: 31.5 PG (ref 26.5–33)
MCHC RBC AUTO-ENTMCNC: 33.8 G/DL (ref 31.5–36.5)
MCV RBC AUTO: 93 FL (ref 78–100)
MICROALBUMIN UR-MCNC: 5 MG/L
MICROALBUMIN/CREAT UR: 5.26 MG/G CR (ref 0–17)
NONHDLC SERPL-MCNC: 98 MG/DL
PLATELET # BLD AUTO: 242 10E3/UL (ref 150–450)
POTASSIUM BLD-SCNC: 4.2 MMOL/L (ref 3.4–5.3)
PROT SERPL-MCNC: 7.4 G/DL (ref 6.8–8.8)
RBC # BLD AUTO: 5.33 10E6/UL (ref 4.4–5.9)
SODIUM SERPL-SCNC: 144 MMOL/L (ref 133–144)
TRIGL SERPL-MCNC: 119 MG/DL
WBC # BLD AUTO: 7.9 10E3/UL (ref 4–11)

## 2022-08-10 PROCEDURE — 99207 PR NON-BILLABLE SERV PER CHARTING: CPT | Performed by: AUDIOLOGIST

## 2022-08-10 PROCEDURE — 92700 UNLISTED ORL SERVICE/PX: CPT | Performed by: AUDIOLOGIST

## 2022-08-10 PROCEDURE — 99204 OFFICE O/P NEW MOD 45 MIN: CPT | Performed by: NURSE PRACTITIONER

## 2022-08-10 PROCEDURE — 92567 TYMPANOMETRY: CPT | Mod: 59 | Performed by: AUDIOLOGIST

## 2022-08-10 PROCEDURE — 80061 LIPID PANEL: CPT | Performed by: PATHOLOGY

## 2022-08-10 PROCEDURE — 92604 REPROGRAM COCHLEAR IMPLT 7/>: CPT | Mod: 50 | Performed by: AUDIOLOGIST

## 2022-08-10 PROCEDURE — 85027 COMPLETE CBC AUTOMATED: CPT | Performed by: PATHOLOGY

## 2022-08-10 PROCEDURE — 83036 HEMOGLOBIN GLYCOSYLATED A1C: CPT | Performed by: PATHOLOGY

## 2022-08-10 PROCEDURE — 80053 COMPREHEN METABOLIC PANEL: CPT | Performed by: PATHOLOGY

## 2022-08-10 PROCEDURE — 36415 COLL VENOUS BLD VENIPUNCTURE: CPT | Performed by: PATHOLOGY

## 2022-08-10 PROCEDURE — 82043 UR ALBUMIN QUANTITATIVE: CPT | Performed by: PATHOLOGY

## 2022-08-10 RX ORDER — GLIMEPIRIDE 2 MG/1
2 TABLET ORAL
COMMUNITY
Start: 2021-06-16

## 2022-08-10 NOTE — PATIENT INSTRUCTIONS
- apixaban (Eliquis), edoxaban (Savaysa), rivaroxaban (Xarelto): Bleeding risk is moderate or high for this procedure AND CrCl  (>=) 50 mL/min. HOLD 2 days before surgery.    - Lisinopril: HOLD due to exceptional risk of hypotension during surgery.    - Atenolol: Continue taking on the day of surgery.   - Hydrochlorotiazide: HOLD on the day of surgery.   - Atorvastatin: Continue taking on the day of surgery.    - Glimepiride: HOLD day of surgery   - Keppra/seizure medications: Continue without modification.   -  gabapentin: Continue without modification.   - Albuterol: Continue PRN. Bring to hospital on the day of surgery.

## 2022-08-10 NOTE — NURSING NOTE
Rapid Response Epic Documentation     Situation:  Patient found seated in lab accompanied by staff and his spouse.  Staff reports they called because the patient had an abnormal ECG showing Afib.  After further review the patient is found to have a history of this rhythm.     Assessment:    Patient found to be alert, calm  and answering questions appropriately.  Patient denies any chest pain or sob.  Patient reports he feels fine and has no medical complaints today.  Patient reports he would like to continue on with his appointment.  Patient left with staff and his spouse to continue on with his appointment.        Mental Status: Alert  CMS: Intact  Stroke Scale: Not Applicable  EKG: Not Performed        Location:   Lab       Disposition:      Stable (D/C home)

## 2022-08-11 ENCOUNTER — TELEPHONE (OUTPATIENT)
Dept: OTOLARYNGOLOGY | Facility: CLINIC | Age: 77
End: 2022-08-11

## 2022-08-11 ENCOUNTER — ANESTHESIA EVENT (OUTPATIENT)
Dept: SURGERY | Facility: CLINIC | Age: 77
End: 2022-08-11
Payer: COMMERCIAL

## 2022-08-11 NOTE — TELEPHONE ENCOUNTER
Called and spoke with Yojana, spouse, re-implant surgery was done at [facility], that is also where he saw the ENT. They do not have a printer at home, I will need to mail out an JACE for patient to sign for the records if [facility] is unable to release records to us without signed authorization.     Amay   Clinic      Records Requested  08/11/22 *no recs   Facility  [facility]  [address] [place] [address]  Phone: [phone]   Outcome Called facility, they are a primary care clinic. Spoke with medical records to see if they have a pre-op for his ear surgery. No pre-op on file for ear surgery.        Records Requested  08/11/22 *no recs   Facility  [facility]  [address] [place] [address]  Phone: [phone]  Med recs ph. [phone]  Med recs fx. [phone].    Outcome Sent an urgent fax for op report     12:15PM received a fax back stating no records. Called patient, spoke with Yojana (spouse) notified that they do not have records. She is not sure, all of patient's info is in [place], none of the records they have are here in [place]. - Amay      Records Requested  08/11/22 *no recs   Facility  [facility]  [address] [place] [address]  Phone: [phone]  Med recs fax. [phone]   Outcome Sent a fax for records - unsure if this is where recs are. Patient had previous imaging done at [facility]        Records Requested  08/11/22    Facility  [facility]  [address] [place] [address]  Phone: [phone]  Med recs Fax: [phone]   Outcome Sent a fax for records

## 2022-08-11 NOTE — TELEPHONE ENCOUNTER
Confirmed appt at the [facility] 8/13 at 9:30 am for a CT scan. No further questions.    Abbi Estes LPN

## 2022-08-11 NOTE — PROGRESS NOTES
AUDIOLOGY REPORT    BACKGROUND INFORMATION: Dr. Mickey Turpin implanted Cas Noriega with a right  Advanced [employer] HiRes 90k Advantage midscala MS cochlear implant (CI) on 12/17/2015 and a left HiRes 90k Ultra CI on 3/27/2017 due to bilateral severe to profound sensorineural hearing loss and lack of benefit from hearing aids. The patient is being seen for troubleshooting in Audiology at [facility]. 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     He will be undergoing surgery for magnet removal on 8/17/22 due to need for MRI for seizures and a brain lesion.       PATIENT REPORT:  Cas and his wife reported that they are holding off on upgrading his equipment at this time due to financial concerns. He reports that he is not hearing well. With both implants on he reported that he is able to understand more speech. It was difficult to get information from Tay today.    FITTING SESSION: Dr. Yovana Murguia, cochlear implant surgeon, ordered today's appointment. The patient came to the clinic for adjustment to the programs in the external speech processor and for assessment of the external components of the cochlear implant system. These components provide power and data to the internal device. Sound is only heard once the external portion is activated. Postoperative treatment, including device fitting and adjustment, audiologic assessments, and training are required at regular intervals. Testing can include electropsychophysical measures of threshold, comfort, and loudness balancing, which are completed to update the program.    Processor type: Lydia CI Q90  Headpiece type: UHP  Magnet strength: Right: 3, Left: 3    TEST RESULTS:   20 minutes were spent evaluating rehab status today. Speech perception testing was not completed.  Aided thresholds: Aided thresholds with right CI in mild hearing loss range. Prior  to programming aided thresholds with the left were in the mild range from 250-1k and then no responses from 1.5-6 kHz. After programming aided thresholds in the left were in the mild hearing loss range from 250-4k sloping to severe at 6 kHz.  Tympanometry: Normal bilaterally (note tracings were lost due to equipment issue)  Electrode Impedances: Right: Stable and within tolerances, Left: Electrodes 10-11 open  Neural Response Testing: Not tested today  Facial Stimulation: Absent  Tinnitus: Present  Balance Problems: Absent  Pain/Discomfort: Absent  Strategies Tried: Optima P, Optima S  Strategy Preference: Right: Optima P, Left: Optima S    Number of Channels per Program: Right: 14 (electrodes 15 and 16 disabled). Left: 9 (electrodes 10-16 disabled today due to open electrodes and no audibility 12-16)     COMMENTS: After aided testing I disabled electrodes 10-11 in the left ear and attempted to measure M levels 12-16 however Tay had no audibility on these electrodes therefore they were disabled. I measured M levels on 1-9 which increased slightly. In live voice he reported the volume was good, however was still not able to understand speech. I measured M levels using loudness scaling in the right ear. Low frequency electrodes remained stable, however there was significant increase in M levels in the higher frequencies. In live voice he reported that the volume was good and speech clarity was better. With both on he reported the volume was on the loud side therefore I globally decreased M's until he reported comfort.    We discussed the findings in the left ear and that I have concerns there are issues with the internal implant or concerns that there is a partial insertion. I will reach out to Dr. Murguia to determine if we can do a CT scan prior to his magnet removal to investigate the left implant placement. They were unsure today where the V1 explant and reimplant were done therefore I will reach out to [facility] for  assistance.     We discussed retention strategies for after the magnet removal. They were happy to hear that he would still be able to use the implants. I recommended toupee tape, a headband or a hat to keep them on. I am happy to assist with this following magnet removal.     SUMMARY AND RECOMMENDATIONS: Cas was seen for bilateral cochlear implant programming today. He will return in 6 weeks for follow up, sooner if concerns arise. Please call this clinic with questions regarding these results or recommendations.        Peña Childress, [facility]  Licensed Audiologist  [place] License #[id number]

## 2022-08-11 NOTE — TELEPHONE ENCOUNTER
Records Requested   Needs JACE - req 22 with JACE    Facility  [facility]  [address] [facility] [address], [place] [address]  Phone: [phone]  Fx. [phone] ATTN ROBEL Cornell Called surgery center, they have one person who processes records request. Sent an urgent fax for 21 op report   22 11:13AM received a call from [facility], they are unable to release records without a physical signature from patient. Unfortunately, patient is unable to sign an JACE at this time. - Amay    *JACE TRACKIN    22 7:13AM sent a fax for records with signed JACE - Amay

## 2022-08-11 NOTE — TELEPHONE ENCOUNTER
Spoke with wife that we need a CT completed prior to surgery. Need this completed before surgery, preferred location of [facility] per Dr. Murguia for surgery planning. Wife states they are OK with anytime for CT between now and surgery. She requests that our team schedule this for them, and they will make anything work.     Plan made for our team to schedule CT, and then will call wife back with date/time confirmation. No further questions.

## 2022-08-12 NOTE — TELEPHONE ENCOUNTER
Records Requested      Facility  [facility]  [facility], [address], [place] [address]  Phone: [phone]  Fx. [phone]  fax [phone]   Outcome Called and left a message with medical records to see if they can fax over the report as [facility] is needing a signed JACE to release records;  2/26/21 by Dr. Krzysztof Monroe        8/17/22 7:13AM sent a fax for records with signed JACE - Amay   *received 1PM, sent to scan - Amay

## 2022-08-13 ENCOUNTER — ANCILLARY PROCEDURE (OUTPATIENT)
Dept: CT IMAGING | Facility: CLINIC | Age: 77
End: 2022-08-13
Attending: OTOLARYNGOLOGY
Payer: COMMERCIAL

## 2022-08-13 DIAGNOSIS — Z96.21 COCHLEAR IMPLANT IN PLACE: ICD-10-CM

## 2022-08-13 PROCEDURE — 70480 CT ORBIT/EAR/FOSSA W/O DYE: CPT | Mod: GC | Performed by: RADIOLOGY

## 2022-08-14 LAB
ATRIAL RATE - MUSE: 254 BPM
DIASTOLIC BLOOD PRESSURE - MUSE: NORMAL MMHG
INTERPRETATION ECG - MUSE: NORMAL
P AXIS - MUSE: 109 DEGREES
PR INTERVAL - MUSE: NORMAL MS
QRS DURATION - MUSE: 112 MS
QT - MUSE: 456 MS
QTC - MUSE: 502 MS
R AXIS - MUSE: 63 DEGREES
SYSTOLIC BLOOD PRESSURE - MUSE: NORMAL MMHG
T AXIS - MUSE: 57 DEGREES
VENTRICULAR RATE- MUSE: 73 BPM

## 2022-08-15 NOTE — RESULT ENCOUNTER NOTE
Surinder Murguia and Praveen Calero: I conducted Tay's pre-op exam. His PMH includes previous MI and CABG. His EKG last week appears consistent with his previous EKG in 2019. His atrial flutter is rate controlled on atenolol and he is holding his eliquis. He denies Chest pain and recent nitroglycerin use during his pre-op exam.    Please reach out with any questions or concerns!  Sean

## 2022-08-16 ENCOUNTER — TELEPHONE (OUTPATIENT)
Dept: OTOLARYNGOLOGY | Facility: CLINIC | Age: 77
End: 2022-08-16

## 2022-08-16 NOTE — TELEPHONE ENCOUNTER
Spoke with pt's wife stating that after MD review, a faint line on the covid test means we have to assume the test is positive so we will need to cancel the pt's surgery tomorrow. Writer expressed apologies, but that we need to keep a safe environment for staff and pt, wife states she completely understands.     Once we find a new surgery date, we will update pt with this plan. His MRI at [facility] is planned for 8/26, and made aware that we likely will not be able have surgery before this date. Wife has my number with any questions and if she doesn't hear a date update by Friday.

## 2022-08-17 ENCOUNTER — ANESTHESIA (OUTPATIENT)
Dept: SURGERY | Facility: CLINIC | Age: 77
End: 2022-08-17
Payer: COMMERCIAL

## 2022-08-19 NOTE — TELEPHONE ENCOUNTER
Wife states that pt is having zero symptoms of covid. Denies cough, fever, lethargy, and all other sx. She states she tested him again today with a rapid test and it came back negative. Writer to update surgery scheduling and we will update pt with a date soon.

## 2022-08-23 NOTE — TELEPHONE ENCOUNTER
Called patient and spoke to patients wife regarding rescheduling   Date of Surgery: 08/31/2022  Approximate arrival time given:  Yes  Location of surgery: [facility]  Pre-Op H&P: Already completed   Post-Op Appt Date: TBD    Imaging needed:  No  Discussed COVID-19 Testing: Yes - no repeat covid-19 test. Patient tested positive already     Patient aware that pre-op RN will call 2-3 days prior to surgery with arrival time and instructions Yes  Packet sent out: No 08/23/22    All patients questions were answered and was instructed to review surgical packet and call back with any questions or concerns.       Franchesca Galvan on 8/23/2022 at 12:11 PM

## 2022-08-26 NOTE — TELEPHONE ENCOUNTER
"Spoke with wife regarding CT results, since they were not given the info as surgery was rescheduled.     Per Dr. Murguia, \"The CT shows that only half of the left cochlear implant electrodes are in the cochlea.      We will proceed with the magnet removal so he can get the brain tumor worked up and will decide what to do from there.     He either had:  1) partial insertion when surgeon in [place] did the replacement   2) OR, the electrode backed out of the cochlea after the [place] surgery, which can occur as well.    Wife states understanding, no further questions at this time.    "

## 2022-08-29 ENCOUNTER — DOCUMENTATION ONLY (OUTPATIENT)
Dept: SLEEP MEDICINE | Facility: CLINIC | Age: 77
End: 2022-08-29

## 2022-08-29 NOTE — PROGRESS NOTES
1ST ATTEMPT: 8/29/2022- JL- SENT STAFF MESSAGE TO ZAIDA RUIZ IN EPIC TO ADDEND VISIT NOTES FROM 7/20/2022  TO SAY PT IS USING AND BENEFITING FROM CPAP.

## 2022-08-31 ENCOUNTER — HOSPITAL ENCOUNTER (OUTPATIENT)
Facility: CLINIC | Age: 77
Discharge: HOME | End: 2022-08-31
Attending: OTOLARYNGOLOGY | Admitting: OTOLARYNGOLOGY
Payer: COMMERCIAL

## 2022-08-31 VITALS
BODY MASS INDEX: 35.44 KG/M2 | TEMPERATURE: 97.9 F | HEART RATE: 80 BPM | OXYGEN SATURATION: 94 % | DIASTOLIC BLOOD PRESSURE: 93 MMHG | SYSTOLIC BLOOD PRESSURE: 139 MMHG | WEIGHT: 247.58 LBS | HEIGHT: 70 IN | RESPIRATION RATE: 16 BRPM

## 2022-08-31 DIAGNOSIS — H90.3 SENSORINEURAL HEARING LOSS, BILATERAL: Primary | ICD-10-CM

## 2022-08-31 LAB
GLUCOSE BLDC GLUCOMTR-MCNC: 115 MG/DL (ref 70–99)
GLUCOSE BLDC GLUCOMTR-MCNC: 99 MG/DL (ref 70–99)

## 2022-08-31 PROCEDURE — 258N000003 HC RX IP 258 OP 636: Performed by: NURSE ANESTHETIST, CERTIFIED REGISTERED

## 2022-08-31 PROCEDURE — 250N000011 HC RX IP 250 OP 636: Performed by: NURSE ANESTHETIST, CERTIFIED REGISTERED

## 2022-08-31 PROCEDURE — 20670 REMOVAL IMPLANT SUPERFICIAL: CPT | Mod: XS | Performed by: OTOLARYNGOLOGY

## 2022-08-31 PROCEDURE — 272N000001 HC OR GENERAL SUPPLY STERILE: Performed by: OTOLARYNGOLOGY

## 2022-08-31 PROCEDURE — 250N000009 HC RX 250: Performed by: NURSE ANESTHETIST, CERTIFIED REGISTERED

## 2022-08-31 PROCEDURE — 710N000012 HC RECOVERY PHASE 2, PER MINUTE: Performed by: OTOLARYNGOLOGY

## 2022-08-31 PROCEDURE — 999N000141 HC STATISTIC PRE-PROCEDURE NURSING ASSESSMENT: Performed by: OTOLARYNGOLOGY

## 2022-08-31 PROCEDURE — 710N000009 HC RECOVERY PHASE 1, LEVEL 1, PER MIN: Performed by: OTOLARYNGOLOGY

## 2022-08-31 PROCEDURE — 360N000077 HC SURGERY LEVEL 4, PER MIN: Performed by: OTOLARYNGOLOGY

## 2022-08-31 PROCEDURE — 250N000011 HC RX IP 250 OP 636: Performed by: OTOLARYNGOLOGY

## 2022-08-31 PROCEDURE — 250N000009 HC RX 250: Performed by: OTOLARYNGOLOGY

## 2022-08-31 PROCEDURE — 82962 GLUCOSE BLOOD TEST: CPT

## 2022-08-31 PROCEDURE — 370N000017 HC ANESTHESIA TECHNICAL FEE, PER MIN: Performed by: OTOLARYNGOLOGY

## 2022-08-31 PROCEDURE — 278N000051 HC OR IMPLANT GENERAL: Performed by: OTOLARYNGOLOGY

## 2022-08-31 PROCEDURE — 250N000025 HC SEVOFLURANE, PER MIN: Performed by: OTOLARYNGOLOGY

## 2022-08-31 DEVICE — IMPLANTABLE DEVICE: Type: IMPLANTABLE DEVICE | Site: EAR | Status: FUNCTIONAL

## 2022-08-31 RX ORDER — HYDROMORPHONE HCL IN WATER/PF 6 MG/30 ML
0.2 PATIENT CONTROLLED ANALGESIA SYRINGE INTRAVENOUS EVERY 5 MIN PRN
Status: CANCELLED | OUTPATIENT
Start: 2022-08-31

## 2022-08-31 RX ORDER — HYDROCODONE BITARTRATE AND ACETAMINOPHEN 5; 325 MG/1; MG/1
1 TABLET ORAL
Status: CANCELLED | OUTPATIENT
Start: 2022-08-31

## 2022-08-31 RX ORDER — FENTANYL CITRATE 50 UG/ML
25 INJECTION, SOLUTION INTRAMUSCULAR; INTRAVENOUS
Status: CANCELLED | OUTPATIENT
Start: 2022-08-31

## 2022-08-31 RX ORDER — HYDRALAZINE HYDROCHLORIDE 20 MG/ML
2.5-5 INJECTION INTRAMUSCULAR; INTRAVENOUS EVERY 10 MIN PRN
Status: CANCELLED | OUTPATIENT
Start: 2022-08-31

## 2022-08-31 RX ORDER — PROPOFOL 10 MG/ML
INJECTION, EMULSION INTRAVENOUS PRN
Status: DISCONTINUED | OUTPATIENT
Start: 2022-08-31 | End: 2022-08-31

## 2022-08-31 RX ORDER — ACETAMINOPHEN 325 MG/1
650 TABLET ORAL
Status: CANCELLED | OUTPATIENT
Start: 2022-08-31

## 2022-08-31 RX ORDER — ONDANSETRON 2 MG/ML
INJECTION INTRAMUSCULAR; INTRAVENOUS PRN
Status: DISCONTINUED | OUTPATIENT
Start: 2022-08-31 | End: 2022-08-31

## 2022-08-31 RX ORDER — SODIUM CHLORIDE, SODIUM LACTATE, POTASSIUM CHLORIDE, CALCIUM CHLORIDE 600; 310; 30; 20 MG/100ML; MG/100ML; MG/100ML; MG/100ML
INJECTION, SOLUTION INTRAVENOUS CONTINUOUS
Status: CANCELLED | OUTPATIENT
Start: 2022-08-31

## 2022-08-31 RX ORDER — FENTANYL CITRATE 50 UG/ML
INJECTION, SOLUTION INTRAMUSCULAR; INTRAVENOUS PRN
Status: DISCONTINUED | OUTPATIENT
Start: 2022-08-31 | End: 2022-08-31

## 2022-08-31 RX ORDER — HALOPERIDOL 5 MG/ML
1 INJECTION INTRAMUSCULAR
Status: CANCELLED | OUTPATIENT
Start: 2022-08-31

## 2022-08-31 RX ORDER — ONDANSETRON 2 MG/ML
4 INJECTION INTRAMUSCULAR; INTRAVENOUS EVERY 30 MIN PRN
Status: CANCELLED | OUTPATIENT
Start: 2022-08-31

## 2022-08-31 RX ORDER — CEFUROXIME SODIUM 1.5 G/16ML
1.5 INJECTION, POWDER, FOR SOLUTION INTRAVENOUS
Status: COMPLETED | OUTPATIENT
Start: 2022-08-31 | End: 2022-08-31

## 2022-08-31 RX ORDER — LIDOCAINE 40 MG/G
CREAM TOPICAL
Status: DISCONTINUED | OUTPATIENT
Start: 2022-08-31 | End: 2022-08-31 | Stop reason: HOSPADM

## 2022-08-31 RX ORDER — FENTANYL CITRATE 50 UG/ML
25 INJECTION, SOLUTION INTRAMUSCULAR; INTRAVENOUS EVERY 5 MIN PRN
Status: CANCELLED | OUTPATIENT
Start: 2022-08-31

## 2022-08-31 RX ORDER — SODIUM CHLORIDE, SODIUM LACTATE, POTASSIUM CHLORIDE, CALCIUM CHLORIDE 600; 310; 30; 20 MG/100ML; MG/100ML; MG/100ML; MG/100ML
INJECTION, SOLUTION INTRAVENOUS CONTINUOUS PRN
Status: DISCONTINUED | OUTPATIENT
Start: 2022-08-31 | End: 2022-08-31

## 2022-08-31 RX ORDER — MEPERIDINE HYDROCHLORIDE 25 MG/ML
12.5 INJECTION INTRAMUSCULAR; INTRAVENOUS; SUBCUTANEOUS
Status: CANCELLED | OUTPATIENT
Start: 2022-08-31

## 2022-08-31 RX ORDER — ONDANSETRON 4 MG/1
4 TABLET, ORALLY DISINTEGRATING ORAL EVERY 30 MIN PRN
Status: CANCELLED | OUTPATIENT
Start: 2022-08-31

## 2022-08-31 RX ORDER — LIDOCAINE HYDROCHLORIDE 20 MG/ML
INJECTION, SOLUTION INFILTRATION; PERINEURAL PRN
Status: DISCONTINUED | OUTPATIENT
Start: 2022-08-31 | End: 2022-08-31

## 2022-08-31 RX ORDER — OXYCODONE HYDROCHLORIDE 5 MG/1
5 TABLET ORAL EVERY 4 HOURS PRN
Status: CANCELLED | OUTPATIENT
Start: 2022-08-31

## 2022-08-31 RX ORDER — DEXAMETHASONE SODIUM PHOSPHATE 4 MG/ML
INJECTION, SOLUTION INTRA-ARTICULAR; INTRALESIONAL; INTRAMUSCULAR; INTRAVENOUS; SOFT TISSUE PRN
Status: DISCONTINUED | OUTPATIENT
Start: 2022-08-31 | End: 2022-08-31

## 2022-08-31 RX ORDER — CEFUROXIME AXETIL 250 MG/1
250 TABLET ORAL 2 TIMES DAILY
Qty: 14 TABLET | Refills: 0 | Status: SHIPPED | OUTPATIENT
Start: 2022-08-31 | End: 2022-09-07

## 2022-08-31 RX ORDER — LIDOCAINE HYDROCHLORIDE AND EPINEPHRINE 10; 10 MG/ML; UG/ML
INJECTION, SOLUTION INFILTRATION; PERINEURAL PRN
Status: DISCONTINUED | OUTPATIENT
Start: 2022-08-31 | End: 2022-08-31 | Stop reason: HOSPADM

## 2022-08-31 RX ORDER — HYDROCODONE BITARTRATE AND ACETAMINOPHEN 5; 325 MG/1; MG/1
1 TABLET ORAL EVERY 6 HOURS PRN
Qty: 18 TABLET | Refills: 0 | Status: SHIPPED | OUTPATIENT
Start: 2022-08-31 | End: 2022-09-03

## 2022-08-31 RX ORDER — DIAZEPAM 10 MG/2ML
2.5 INJECTION, SOLUTION INTRAMUSCULAR; INTRAVENOUS
Status: CANCELLED | OUTPATIENT
Start: 2022-08-31

## 2022-08-31 RX ORDER — DEXAMETHASONE SODIUM PHOSPHATE 10 MG/ML
10 INJECTION, SOLUTION INTRAMUSCULAR; INTRAVENOUS ONCE
Status: DISCONTINUED | OUTPATIENT
Start: 2022-08-31 | End: 2022-08-31 | Stop reason: HOSPADM

## 2022-08-31 RX ORDER — CEFUROXIME SODIUM 1.5 G/16ML
1.5 INJECTION, POWDER, FOR SOLUTION INTRAVENOUS SEE ADMIN INSTRUCTIONS
Status: DISCONTINUED | OUTPATIENT
Start: 2022-08-31 | End: 2022-08-31 | Stop reason: HOSPADM

## 2022-08-31 RX ADMIN — CEFUROXIME 1.5 G: 1.5 INJECTION, POWDER, FOR SOLUTION INTRAVENOUS at 14:33

## 2022-08-31 RX ADMIN — PHENYLEPHRINE HYDROCHLORIDE 100 MCG: 10 INJECTION INTRAVENOUS at 14:37

## 2022-08-31 RX ADMIN — FENTANYL CITRATE 25 MCG: 50 INJECTION, SOLUTION INTRAMUSCULAR; INTRAVENOUS at 15:23

## 2022-08-31 RX ADMIN — PHENYLEPHRINE HYDROCHLORIDE 0.3 MCG/KG/MIN: 10 INJECTION INTRAVENOUS at 15:36

## 2022-08-31 RX ADMIN — FENTANYL CITRATE 25 MCG: 50 INJECTION, SOLUTION INTRAMUSCULAR; INTRAVENOUS at 15:21

## 2022-08-31 RX ADMIN — PROPOFOL 100 MG: 10 INJECTION, EMULSION INTRAVENOUS at 14:29

## 2022-08-31 RX ADMIN — FENTANYL CITRATE 25 MCG: 50 INJECTION, SOLUTION INTRAMUSCULAR; INTRAVENOUS at 17:23

## 2022-08-31 RX ADMIN — Medication 0.5 UNITS: at 15:46

## 2022-08-31 RX ADMIN — SODIUM CHLORIDE, POTASSIUM CHLORIDE, SODIUM LACTATE AND CALCIUM CHLORIDE: 600; 310; 30; 20 INJECTION, SOLUTION INTRAVENOUS at 14:28

## 2022-08-31 RX ADMIN — FENTANYL CITRATE 25 MCG: 50 INJECTION, SOLUTION INTRAMUSCULAR; INTRAVENOUS at 16:16

## 2022-08-31 RX ADMIN — Medication 0.5 UNITS: at 15:05

## 2022-08-31 RX ADMIN — FENTANYL CITRATE 25 MCG: 50 INJECTION, SOLUTION INTRAMUSCULAR; INTRAVENOUS at 15:14

## 2022-08-31 RX ADMIN — ONDANSETRON 4 MG: 2 INJECTION INTRAMUSCULAR; INTRAVENOUS at 17:13

## 2022-08-31 RX ADMIN — FENTANYL CITRATE 25 MCG: 50 INJECTION, SOLUTION INTRAMUSCULAR; INTRAVENOUS at 16:10

## 2022-08-31 RX ADMIN — PHENYLEPHRINE HYDROCHLORIDE 200 MCG: 10 INJECTION INTRAVENOUS at 14:42

## 2022-08-31 RX ADMIN — PHENYLEPHRINE HYDROCHLORIDE 150 MCG: 10 INJECTION INTRAVENOUS at 15:33

## 2022-08-31 RX ADMIN — Medication 0.5 UNITS: at 15:36

## 2022-08-31 RX ADMIN — DEXAMETHASONE SODIUM PHOSPHATE 10 MG: 4 INJECTION, SOLUTION INTRA-ARTICULAR; INTRALESIONAL; INTRAMUSCULAR; INTRAVENOUS; SOFT TISSUE at 14:30

## 2022-08-31 RX ADMIN — Medication 0.5 UNITS: at 15:09

## 2022-08-31 RX ADMIN — PROPOFOL 60 MG: 10 INJECTION, EMULSION INTRAVENOUS at 14:30

## 2022-08-31 RX ADMIN — Medication 0.5 UNITS: at 14:55

## 2022-08-31 RX ADMIN — Medication 0.5 UNITS: at 15:24

## 2022-08-31 RX ADMIN — FENTANYL CITRATE 25 MCG: 50 INJECTION, SOLUTION INTRAMUSCULAR; INTRAVENOUS at 14:47

## 2022-08-31 RX ADMIN — LIDOCAINE HYDROCHLORIDE 100 MG: 20 INJECTION, SOLUTION INFILTRATION; PERINEURAL at 14:28

## 2022-08-31 RX ADMIN — FENTANYL CITRATE 50 MCG: 50 INJECTION, SOLUTION INTRAMUSCULAR; INTRAVENOUS at 16:22

## 2022-08-31 RX ADMIN — PHENYLEPHRINE HYDROCHLORIDE 100 MCG: 10 INJECTION INTRAVENOUS at 15:06

## 2022-08-31 RX ADMIN — PROPOFOL 30 MG: 10 INJECTION, EMULSION INTRAVENOUS at 15:23

## 2022-08-31 RX ADMIN — PHENYLEPHRINE HYDROCHLORIDE 150 MCG: 10 INJECTION INTRAVENOUS at 14:48

## 2022-08-31 RX ADMIN — PHENYLEPHRINE HYDROCHLORIDE 150 MCG: 10 INJECTION INTRAVENOUS at 15:24

## 2022-08-31 ASSESSMENT — ACTIVITIES OF DAILY LIVING (ADL)
ADLS_ACUITY_SCORE: 35
ADLS_ACUITY_SCORE: 37

## 2022-08-31 NOTE — BRIEF OP NOTE
[facility] Brief Operative Note    Pre-operative diagnosis: Seizures (H) [R56.9]  Cochlear implant in place [Z96.21]   Post-operative diagnosis Cochlear implants in place   Procedure: Procedure(s):  Bilateral magnet removal from cochlear implants, placement of nonmagnetic plugs   Surgeon(s): Surgeon(s) and Role:     * Yovana Murguia MD - Primary   Estimated blood loss: 15 mL    Specimens: * No specimens in log *   Findings: Magnets removed, non-magnetic spacers placed.  Left: 3D model plug; Right: HiRes 90k Model dummy

## 2022-08-31 NOTE — ANESTHESIA CARE TRANSFER NOTE
Patient: Cas Noriega    Procedure: Procedure(s):  Bilateral magnet removal from cochlear implants, placement of nonmagnetic plugs       Diagnosis: Seizures (H) [R56.9]  Cochlear implant in place [Z96.21]  Diagnosis Additional Information: No value filed.    Anesthesia Type:   General     Note:    Oropharynx: oropharynx clear of all foreign objects and spontaneously breathing  Level of Consciousness: drowsy  Oxygen Supplementation: face mask  Level of Supplemental Oxygen (L/min / FiO2): 6  Independent Airway: airway patency satisfactory and stable  Dentition: dentition unchanged  Vital Signs Stable: post-procedure vital signs reviewed and stable  Report to RN Given: handoff report given  Patient transferred to: PACU    Handoff Report: Identifed the Patient, Identified the Reponsible Provider, Reviewed the pertinent medical history, Discussed the surgical course, Reviewed Intra-OP anesthesia mangement and issues during anesthesia, Set expectations for post-procedure period and Allowed opportunity for questions and acknowledgement of understanding      Vitals:  Vitals Value Taken Time   /81    Temp     Pulse 82 08/31/22 1743   Resp 9 08/31/22 1743   SpO2 97 % 08/31/22 1743   Vitals shown include unvalidated device data.    Electronically Signed By: GEOFFREY Pendleton CRNA  August 31, 2022  5:44 PM

## 2022-08-31 NOTE — ANESTHESIA PREPROCEDURE EVALUATION
Anesthesia Pre-Procedure Evaluation    Patient: Cas Noriega   MRN: [id number] : 1945        Procedure : Procedure(s):  Bilateral magnet removal from cochlear implants, placement of nonmagnetic plugs          Past Medical History:   Diagnosis Date     Actinic keratosis      Acute myocardial infarction of other inferior wall, episode of care unspecified      Arthritis      CAD (coronary artery disease) 2013     COPD (chronic obstructive pulmonary disease) (H)      Diverticulitis of small intestine (without mention of hemorrhage)     recovered     Generalized anxiety disorder 2010     Hyperlipidemia LDL goal < 70      Hypertension goal BP (blood pressure) < 140/90      Infection of prosthetic shoulder joint 2015     Insomnia      Intermittent asthma 2010     Lumbago     decrease feeling in r foot,,epidural by spinal surgeon, no improvement     Mild major depression (H) 2010     MARTY (obstructive sleep apnea) 2005    AHI 46.5 CPAP     Other specified disorders of rotator cuff syndrome of shoulder and allied disorders     removed and replaced     Periodic limb movement disorder     dxd at sleep study.  RLS      Prediabetes 2019     Restless legs syndrome (RLS)      Rotator cuff disorder 2010    H/o rotator cuff repair on right- L shoulder surgery pending-Dr. Hartley until insurance changes      S/P shoulder joint replacement 2013     Senile nuclear sclerosis 6/3/2012     Sensorineural hearing loss, bilateral 2012     Type 2 diabetes mellitus without complication, without long-term current use of insulin (H) 2020      Past Surgical History:   Procedure Laterality Date     ARTHROPLASTY SHOULDER  2013    Procedure: ARTHROPLASTY SHOULDER;  Left Total Shoulder Arthropasty;  Surgeon: Xu Snell MD;  Location: [facility]     BACK SURGERY      l5-s1 decompression & fusion w/instermintation     BREAST SURGERY      benigen  lump rmvd l brest     CARDIAC SURGERY  2000     CHOLECYSTECTOMY, LAPOROSCOPIC  3/2007    Cholecystectomy, Laparoscopic     COLONOSCOPY  8/2009    Benign polyp     COLONOSCOPY N/A 3/25/2015    Procedure: COLONOSCOPY;  Surgeon: Deejay Marie MD;  Location: [facility]     IMPLANT COCHLEA WITH NERVE INTEGRITY MONITOR Right 12/17/2015    Procedure: IMPLANT COCHLEA WITH NERVE INTEGRITY MONITOR;  Surgeon: Mickey Turpin MD;  Location: [facility]     IMPLANT COCHLEA WITH NERVE INTEGRITY MONITOR Left 3/27/2017    Procedure: IMPLANT COCHLEA WITH NERVE INTEGRITY MONITOR;  Surgeon: Mickey Turpin MD;  Location: [facility]     PHACOEMULSIFICATION WITH STANDARD INTRAOCULAR LENS IMPLANT  6/4/2012    Procedure:PHACOEMULSIFICATION WITH STANDARD INTRAOCULAR LENS IMPLANT; Left kelman phacoemulsification with intraocular lens implant; Surgeon:DAYDAY HILL; Location:[facility]     PHACOEMULSIFICATION WITH STANDARD INTRAOCULAR LENS IMPLANT  7/26/2012    Procedure: PHACOEMULSIFICATION WITH STANDARD INTRAOCULAR LENS IMPLANT;  Right Kelman phacoemulsification with intraocular lens implant;  Surgeon: Dayday Hill MD;  Location: [facility]     RELEASE CARPAL TUNNEL  10/23/2012    Procedure: RELEASE CARPAL TUNNEL;  Right carpal tunnel release;  Surgeon: Xu Snell MD;  Location: [facility]     RELEASE CARPAL TUNNEL  11/9/2012    Procedure: RELEASE CARPAL TUNNEL;  Left carpal tunnel release;  Surgeon: Xu Snell MD;  Location: [facility]     REMOVE HARDWARE ARTHROPLASTY SHOULDER  5/2/15    attempted and ended up doing bacteria removal     SURGICAL HISTORY OF -   3/1985    Bilateral Inguinal Herniorrhaphy     SURGICAL HISTORY OF -   5/1997    Breast Lumpectomy-Left-Benign     SURGICAL HISTORY OF -   6/10/2005    Rotator Cuff Repair     SURGICAL HISTORY OF -   10/2005    L5-S1 decompression and fusion with intrumentation     SURGICAL HISTORY OF -   2005    Right Shoulder Arthroplasty     SURGICAL HISTORY OF -   2006    PTCA with stent  RCA     SURGICAL HISTORY OF -       Coronary Artery Bypass Graft     VASCULAR SURGERY      2 stents b4 bypass      Allergies   Allergen Reactions     Nka [No Known Allergies]       Social History     Tobacco Use     Smoking status: Former Smoker     Packs/day: 1.00     Years: 40.00     Pack years: 40.00     Types: Cigarettes, Cigarettes     Start date: 1956     Quit date: 1998     Years since quittin.6     Smokeless tobacco: Never Used   Substance Use Topics     Alcohol use: Yes     Comment: twice yearly      Wt Readings from Last 1 Encounters:   22 112.3 kg (247 lb 9.2 oz)        Anesthesia Evaluation   Pt has had prior anesthetic.     No history of anesthetic complications       ROS/MED HX  ENT/Pulmonary:     (+) sleep apnea, uses CPAP, Intermittent, asthma     Neurologic:       Cardiovascular:     (+) Dyslipidemia hypertension--CAD -past MI -stent-    METS/Exercise Tolerance: 1 - Eating, dressing    Hematologic:       Musculoskeletal:   (+) arthritis,     GI/Hepatic:  - neg GI/hepatic ROS     Renal/Genitourinary:       Endo:     (+) type II DM, Obesity,     Psychiatric/Substance Use:     (+) psychiatric history anxiety and depression     Infectious Disease:       Malignancy:       Other:      (+) , H/O Chronic Pain,other significant disability Deaf and Wheelchair bound,          Physical Exam    Airway        Mallampati: II   TM distance: > 3 FB   Neck ROM: full   Mouth opening: > 3 cm    Respiratory Devices and Support         Dental         B=Bridge, C=Chipped, L=Loose, M=Missing    Cardiovascular   cardiovascular exam normal          Pulmonary   pulmonary exam normal                OUTSIDE LABS:  CBC:   Lab Results   Component Value Date    WBC 7.9 08/10/2022    WBC 11.1 (H) 2020    HGB 16.8 08/10/2022    HGB 15.3 2020    HCT 49.7 08/10/2022    HCT 46.4 2020     08/10/2022     2020     BMP:   Lab Results   Component Value Date     08/10/2022      05/14/2020    POTASSIUM 4.2 08/10/2022    POTASSIUM 4.1 05/14/2020    CHLORIDE 107 08/10/2022    CHLORIDE 106 05/14/2020    CO2 29 08/10/2022    CO2 32 05/14/2020    BUN 9 08/10/2022    BUN 10 05/14/2020    CR 0.73 08/10/2022    CR 0.70 05/14/2020    GLC 99 08/31/2022    GLC 86 08/10/2022     COAGS: No results found for: PTT, INR, FIBR  POC: No results found for: BGM, HCG, HCGS  HEPATIC:   Lab Results   Component Value Date    ALBUMIN 4.0 08/10/2022    PROTTOTAL 7.4 08/10/2022    ALT 47 08/10/2022    AST 34 08/10/2022    ALKPHOS 79 08/10/2022    BILITOTAL 0.6 08/10/2022     OTHER:   Lab Results   Component Value Date    A1C 5.8 (H) 08/10/2022    TATUM 9.4 08/10/2022    TSH 1.21 05/14/2020    SED 5 02/26/2013       Anesthesia Plan    ASA Status:  3      Anesthesia Type: General.     - Airway: LMA   Induction: Intravenous, Propofol.   Maintenance: Balanced.        Consents    Anesthesia Plan(s) and associated risks, benefits, and realistic alternatives discussed. Questions answered and patient/representative(s) expressed understanding.    - Discussed:     - Discussed with:  Patient      - Extended Intubation/Ventilatory Support Discussed: No.      - Patient is DNR/DNI Status: No    Use of blood products discussed: No .     Postoperative Care    Pain management: IV analgesics.   PONV prophylaxis: Ondansetron (or other 5HT-3), Dexamethasone or Solumedrol     Comments:                Kwadwo Mendoza MD

## 2022-08-31 NOTE — INTERVAL H&P NOTE
"I have reviewed the surgical (or preoperative) H&P that is linked to this encounter, and examined the patient. There are no significant changes    Clinical Conditions Present on Arrival:  Clinically Significant Risk Factors Present on Admission                 # Coagulation Defect: home medication list includes an anticoagulant medication   # Obesity: Estimated body mass index is 35.52 kg/m  as calculated from the following:    Height as of this encounter: 1.778 m (5' 10\").    Weight as of this encounter: 112.3 kg (247 lb 9.2 oz).       "

## 2022-08-31 NOTE — ANESTHESIA PROCEDURE NOTES
Airway       Patient location during procedure: OR       Procedure Start/Stop Times: 8/31/2022 2:30 PM  Staff -        Anesthesiologist:  Kwadwo Mendoza MD       CRNA: Jody Valero APRN CRNA       Performed By: CRNAIndications and Patient Condition       Indications for airway management: geoffrey-procedural       Induction type:intravenous       Mask difficulty assessment: 2 - vent by mask + OA or adjuvant +/- NMBA    Final Airway Details       Final airway type: supraglottic airway    Supraglottic Airway Details        Type: LMA       Brand: I-Gel       LMA size: 5    Post intubation assessment        Placement verified by: capnometry, equal breath sounds and chest rise        Number of attempts at approach: 1       Number of other approaches attempted: 0       Secured with: silk tape       Ease of procedure: easy       Dentition: Intact and Unchanged    Medication(s) Administered   Medication Administration Time: 8/31/2022 2:30 PM

## 2022-08-31 NOTE — ANESTHESIA POSTPROCEDURE EVALUATION
Patient: Cas Noriega    Procedure: Procedure(s):  Bilateral magnet removal from cochlear implants, placement of nonmagnetic plugs       Anesthesia Type:  General    Note:  Disposition: Outpatient   Postop Pain Control: Uneventful            Sign Out: Well controlled pain   PONV: No   Neuro/Psych: Uneventful            Sign Out: Acceptable/Baseline neuro status   Airway/Respiratory: Uneventful            Sign Out: Acceptable/Baseline resp. status   CV/Hemodynamics: Uneventful            Sign Out: Acceptable CV status   Other NRE: NONE   DID A NON-ROUTINE EVENT OCCUR? No           Last vitals:  Vitals Value Taken Time   /73 08/31/22 1810   Temp 36.8  C (98.3  F) 08/31/22 1745   Pulse 82 08/31/22 1813   Resp 7 08/31/22 1813   SpO2 98 % 08/31/22 1813   Vitals shown include unvalidated device data.    Electronically Signed By: Naye Fabian MD  August 31, 2022  6:14 PM

## 2022-08-31 NOTE — DISCHARGE INSTRUCTIONS
ENT Post-Operative Care Instructions    You are going home with a few medications: pain medications and antibiotics  Please refrain from lifting greater than 10 pounds.  Keep the incisions in head dry until the follow-up.  Follow-up in clinic as scheduled in 1-2 weeks.     [facility]   Adult Discharge Orders & Instructions     For 24 hours after surgery    Get plenty of rest.  A responsible adult must stay with you for at least 24 hours after you leave the hospital.   Do not drive or use heavy equipment.  If you have weakness or tingling, don't drive or use heavy equipment until this feeling goes away.  Do not drink alcohol.  Avoid strenuous or risky activities.  Ask for help when climbing stairs.   You may feel lightheaded.  IF so, sit for a few minutes before standing.  Have someone help you get up.   If you have nausea (feel sick to your stomach): Drink only clear liquids such as apple juice, ginger ale, broth or 7-Up.  Rest may also help.  Be sure to drink enough fluids.  Move to a regular diet as you feel able.  You may have a slight fever. Call the doctor if your fever is over 100 F (37.7 C) (taken under the tongue) or lasts longer than 24 hours.  You may have a dry mouth, a sore throat, muscle aches or trouble sleeping.  These should go away after 24 hours.  Do not make important or legal decisions.   Call your doctor for any of the followin.  Signs of infection (fever, growing tenderness at the surgery site, a large amount of drainage or bleeding, severe pain, foul-smelling drainage, redness, swelling).    2. It has been over 8 to 10 hours since surgery and you are still not able to urinate (pass water).    3.  Headache for over 24 hours.    4.  Numbness, tingling or weakness the day after surgery (if you had spinal anesthesia).  To contact a doctor, call Dr Emerson's office at [phone]       After hours, call [phone] and ask for ENT resident on call.

## 2022-09-10 ENCOUNTER — APPOINTMENT (OUTPATIENT)
Dept: GENERAL RADIOLOGY | Facility: CLINIC | Age: 77
End: 2022-09-10
Attending: FAMILY MEDICINE
Payer: COMMERCIAL

## 2022-09-10 PROCEDURE — 99284 EMERGENCY DEPT VISIT MOD MDM: CPT | Performed by: EMERGENCY MEDICINE

## 2022-09-10 PROCEDURE — 73110 X-RAY EXAM OF WRIST: CPT | Mod: LT

## 2022-09-10 PROCEDURE — 99283 EMERGENCY DEPT VISIT LOW MDM: CPT | Performed by: EMERGENCY MEDICINE

## 2022-09-11 ENCOUNTER — HOSPITAL ENCOUNTER (EMERGENCY)
Facility: CLINIC | Age: 77
Discharge: HOME | End: 2022-09-11
Attending: EMERGENCY MEDICINE | Admitting: EMERGENCY MEDICINE
Payer: COMMERCIAL

## 2022-09-11 VITALS
DIASTOLIC BLOOD PRESSURE: 86 MMHG | TEMPERATURE: 97.8 F | HEART RATE: 88 BPM | RESPIRATION RATE: 22 BRPM | OXYGEN SATURATION: 97 % | SYSTOLIC BLOOD PRESSURE: 140 MMHG

## 2022-09-11 DIAGNOSIS — M25.532 LEFT WRIST PAIN: ICD-10-CM

## 2022-09-11 PROCEDURE — 250N000013 HC RX MED GY IP 250 OP 250 PS 637: Performed by: EMERGENCY MEDICINE

## 2022-09-11 RX ORDER — OXYCODONE HYDROCHLORIDE 5 MG/1
5 TABLET ORAL EVERY 6 HOURS PRN
Qty: 10 TABLET | Refills: 0 | Status: SHIPPED | OUTPATIENT
Start: 2022-09-11 | End: 2022-09-15

## 2022-09-11 RX ORDER — ACETAMINOPHEN 500 MG
1000 TABLET ORAL ONCE
Status: COMPLETED | OUTPATIENT
Start: 2022-09-11 | End: 2022-09-11

## 2022-09-11 RX ORDER — OXYCODONE HYDROCHLORIDE 5 MG/1
10 TABLET ORAL ONCE
Status: COMPLETED | OUTPATIENT
Start: 2022-09-11 | End: 2022-09-11

## 2022-09-11 RX ADMIN — ACETAMINOPHEN 1000 MG: 500 TABLET, FILM COATED ORAL at 01:06

## 2022-09-11 RX ADMIN — OXYCODONE HYDROCHLORIDE 10 MG: 5 TABLET ORAL at 01:06

## 2022-09-11 ASSESSMENT — ENCOUNTER SYMPTOMS
VOMITING: 0
JOINT SWELLING: 1

## 2022-09-11 NOTE — ED PROVIDER NOTES
History     Chief Complaint   Patient presents with     Wrist Pain     HPI  Cas Noriega is a 77 year old male who presents for left wrist pain.  History is obtained from the patient and his significant other.  Shortly prior to his arrival here he fell while going up the stairs and landed on his left outstretched hand.  He did not his head or have loss of consciousness.  He has been complaining of pain in the wrist since this happened, aching.  They tried oxycodone with minimal improvement.  No nausea or vomiting.    Allergies:  Allergies   Allergen Reactions     Nka [No Known Allergies]        Problem List:    Patient Active Problem List    Diagnosis Date Noted     Seizures (H) 08/05/2022     Priority: Medium     Added automatically from request for surgery [id number]       Major depressive disorder in partial remission, unspecified whether recurrent (H) 07/20/2022     Priority: Medium     Chronic atrial fibrillation (H) 07/01/2020     Priority: Medium     Started in hospital Nov 2019.  Seen again on June 2020 zio.       Low serum vitamin B12 06/01/2020     Priority: Medium     During hospitalization Dec 2019.  Normal May 2020.       Atrial flutter, unspecified type (H) 06/01/2020     Priority: Medium     A flutter and A fib noted during Dec 2019 hospitalization in [place].  Was to be on eliquis.  Unclear to me if this all could have been related to his FUO, question of sepsis.  Will have do zio and have addressed at July cardiology Follow-up.       Type 2 diabetes mellitus without complication, without long-term current use of insulin (H) 05/14/2020     Priority: Medium     Diagnosis 5/14/2020.       Obesity (BMI 35.0-39.9) with comorbidity (H) 05/16/2019     Priority: Medium     Encounter for long-term (current) use of antibiotics 01/12/2016     Priority: Medium     Life long for shoulder joint infection post op-duricef       Cochlear implant in place 12/22/2015     Priority: Medium     Cochlear implant placed on  12/17/15 by Dr. Dominguez Turpin: Right, [facility], [facility], #[id number].    Cochlear implant placed on 3/27/17 by Dr. Dominguez Turpin: Left, [employer], [facility], #[id number].       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       SEVERE MARTY (obstructive sleep apnea) 01/07/2015     Priority: Medium     [facility] 8/30/2005 AHI 46.8 Wes 80% CPAP       CAD (coronary artery disease) 02/27/2013     Priority: Medium     NM Lexiscan 8/10/2015: Small apical ischemia. Basal inferior nontransmural infarct with mild geoffrey-infarct ischemia. EF 50% with stress.    Cardiac echocardiogram 5/7/2014: Preserved LV systolic function, estimated EF 60-65%. No WMA. Trace MR. Trace to mild TR.    History of stent and CABG 2007.  Saw sleep specialist April 2017, severe MARTY felt stable, with plan of recheck in 2 yrs.  No chest pains, chest pressures, SOB or sudden change of endurance.   Notes some panting if doing >1 flight of stairs, but this is not a change.         S/P shoulder joint replacement 02/26/2013     Priority: Medium     Sensorineural hearing loss, bilateral 07/18/2012     Priority: Medium     Senile nuclear sclerosis 06/03/2012     Priority: Medium     Advance Care Planning 05/21/2012     Priority: Medium     Advance Care Planning 11/25/2015: Receipt of ACP document:  Received: Health Care Directive which was witnessed or notarized on 12-19-12.  Document previously scanned on 11-10-15.  Validation form completed and sent to be scanned.  Code Status reflects choices in most recent ACP document.  Confirmed/documented designated decision maker(s).  Phone number needed for alternate health care agent.  Added by NORY HUGGINS  Discussed advance care planning with patient; information given to patient to review. 5/21/2012          COPD (chronic obstructive pulmonary disease) (H) 12/20/2010     Priority: Medium     Per hospitalization Dec 2019, [place]  records.  Prev diagnosis intermittent asthma.       Rotator cuff disorder 06/23/2010     Priority: Medium     H/o rotator cuff repair on right-6/05  L shoulder surgery pending-Dr. Hartley until insurance changes       Generalized anxiety disorder 04/28/2010     Priority: Medium     Mild major depression (H) 04/28/2010     Priority: Medium     Essential hypertension      Priority: Medium     Acute myocardial infarction of other inferior wall, episode of care unspecified      Priority: Medium     2007 and 2008. He then had an inferior wall myocardial infarction and was taken emergently to [facility] were her received 2 coronary stents. He was then scheduled for bypass surgery. Underwent multi-bypass surgery with unfortunately no old records available.       Hyperlipidemia with target LDL less than 70      Priority: Medium     Diagnosis updated by automated process. Provider to review and confirm.       Restless legs syndrome (RLS)      Priority: Medium     Insomnia      Priority: Medium     Lumbago      Priority: Medium     decrease feeling in r foot,,epidural by spinal surgeon, no improvement,-2nd and third and fourth, stand long periods of time, shower, numb in left hip/upper thigh, gets back ache if stands too long  Recommended medication, pt did not want to use, recommended additional surgery, pt wants to hold          Past Medical History:    Past Medical History:   Diagnosis Date     Actinic keratosis      Acute myocardial infarction of other inferior wall, episode of care unspecified      Arthritis 1992     CAD (coronary artery disease) 2/27/2013     COPD (chronic obstructive pulmonary disease) (H) 1994     Diverticulitis of small intestine (without mention of hemorrhage) 2003     Generalized anxiety disorder 4/28/2010     Hyperlipidemia LDL goal < 70      Hypertension goal BP (blood pressure) < 140/90      Infection of prosthetic shoulder joint 4/1/2015     Insomnia      Intermittent asthma  12/20/2010     Lumbago      Mild major depression (H) 4/28/2010     MARTY (obstructive sleep apnea) 8/30/2005     Other specified disorders of rotator cuff syndrome of shoulder and allied disorders      Periodic limb movement disorder 2005     Prediabetes 5/16/2019     Restless legs syndrome (RLS)      Rotator cuff disorder 6/23/2010     S/P shoulder joint replacement 2/26/2013     Senile nuclear sclerosis 6/3/2012     Sensorineural hearing loss, bilateral 7/18/2012     Type 2 diabetes mellitus without complication, without long-term current use of insulin (H) 5/14/2020       Past Surgical History:    Past Surgical History:   Procedure Laterality Date     ARTHROPLASTY SHOULDER  2/26/2013    Procedure: ARTHROPLASTY SHOULDER;  Left Total Shoulder Arthropasty;  Surgeon: Xu Snell MD;  Location: [facility] OR     BACK SURGERY  1992    l5-s1 decompression & fusion w/instermintation     BREAST SURGERY  1990    benigen lump rmvd l brest     CARDIAC SURGERY  2000     CHOLECYSTECTOMY, LAPOROSCOPIC  3/2007    Cholecystectomy, Laparoscopic     COLONOSCOPY  8/2009    Benign polyp     COLONOSCOPY N/A 3/25/2015    Procedure: COLONOSCOPY;  Surgeon: Deejay Marie MD;  Location: [facility] GI     IMPLANT COCHLEA WITH NERVE INTEGRITY MONITOR Right 12/17/2015    Procedure: IMPLANT COCHLEA WITH NERVE INTEGRITY MONITOR;  Surgeon: Mickey Turpin MD;  Location:  OR     IMPLANT COCHLEA WITH NERVE INTEGRITY MONITOR Left 3/27/2017    Procedure: IMPLANT COCHLEA WITH NERVE INTEGRITY MONITOR;  Surgeon: Mickey Turpin MD;  Location:  OR     PHACOEMULSIFICATION WITH STANDARD INTRAOCULAR LENS IMPLANT  6/4/2012    Procedure:PHACOEMULSIFICATION WITH STANDARD INTRAOCULAR LENS IMPLANT; Left kelman phacoemulsification with intraocular lens implant; Surgeon:TEZ HILL; Location:[facility] OR     PHACOEMULSIFICATION WITH STANDARD INTRAOCULAR LENS IMPLANT  7/26/2012    Procedure: PHACOEMULSIFICATION WITH STANDARD INTRAOCULAR LENS IMPLANT;   Right Kelman phacoemulsification with intraocular lens implant;  Surgeon: Dayday Daley MD;  Location: [facility] OR     RELEASE CARPAL TUNNEL  10/23/2012    Procedure: RELEASE CARPAL TUNNEL;  Right carpal tunnel release;  Surgeon: Xu Snell MD;  Location: [facility] OR     RELEASE CARPAL TUNNEL  2012    Procedure: RELEASE CARPAL TUNNEL;  Left carpal tunnel release;  Surgeon: Xu Snell MD;  Location: [facility]     REMOVE COCHLEAR MAGNET Bilateral 2022    Procedure: Bilateral magnet removal from cochlear implants, placement of nonmagnetic plugs;  Surgeon: Yovana Murguia MD;  Location: [facility] OR     REMOVE HARDWARE ARTHROPLASTY SHOULDER  5/2/15    attempted and ended up doing bacteria removal     SURGICAL HISTORY OF -   3/1985    Bilateral Inguinal Herniorrhaphy     SURGICAL HISTORY OF -   1997    Breast Lumpectomy-Left-Benign     SURGICAL HISTORY OF -   6/10/2005    Rotator Cuff Repair     SURGICAL HISTORY OF -   10/2005    L5-S1 decompression and fusion with intrumentation     SURGICAL HISTORY OF -       Right Shoulder Arthroplasty     SURGICAL HISTORY OF -       PTCA with stent RCA     SURGICAL HISTORY OF -       Coronary Artery Bypass Graft     VASCULAR SURGERY      2 stents b4 bypass       Family History:    Family History   Problem Relation Age of Onset     Cancer Father         bone cancer     Diabetes Son         2015     Asthma No family hx of              Obesity No family hx of      Hypertension No family hx of        Social History:  Marital Status:   [2]  Social History     Tobacco Use     Smoking status: Former Smoker     Packs/day: 1.00     Years: 40.00     Pack years: 40.00     Types: Cigarettes, Cigarettes     Start date: 1956     Quit date: 1998     Years since quittin.7     Smokeless tobacco: Never Used   Vaping Use     Vaping Use: Never used   Substance Use Topics     Alcohol use: Yes     Comment: twice yearly     Drug use: No         Medications:    oxyCODONE (ROXICODONE) 5 MG tablet  albuterol (PROAIR HFA/PROVENTIL HFA/VENTOLIN HFA) 108 (90 Base) MCG/ACT inhaler  apixaban ANTICOAGULANT (ELIQUIS) 5 MG tablet  atenolol (TENORMIN) 50 MG tablet  atorvastatin (LIPITOR) 80 MG tablet  cefadroxil (DURICEF) 500 MG capsule  cyanocobalamin (VITAMIN B-12) 1000 MCG tablet  fluticasone (FLONASE) 50 MCG/ACT nasal spray  gabapentin (NEURONTIN) 800 MG tablet  glimepiride (AMARYL) 2 MG tablet  hydrochlorothiazide (HYDRODIURIL) 25 MG tablet  Lactobacillus (FLORAJEN ACIDOPHILUS) CAPS  levETIRAcetam (KEPPRA) 1000 MG tablet  lisinopril (ZESTRIL) 40 MG tablet  nitroGLYcerin (NITROSTAT) 0.4 MG sublingual tablet  order for DME  ORDER FOR DME  traZODone (DESYREL) 100 MG tablet  zonisamide (ZONEGRAN) 25 MG capsule          Review of Systems   Gastrointestinal: Negative for vomiting.   Musculoskeletal: Positive for joint swelling.       Physical Exam   BP: (!) 142/86  Pulse: 86  Temp: 97.8  F (36.6  C)  Resp: 18  SpO2: 96 %      Physical Exam  Constitutional:       General: He is not in acute distress.     Appearance: He is well-developed. He is not diaphoretic.   HENT:      Head: Normocephalic and atraumatic.   Eyes:      General: No scleral icterus.  Musculoskeletal:      Cervical back: Normal range of motion and neck supple.      Comments: Left wrist swollen and tender along the joint line   Skin:     General: Skin is warm and dry.      Findings: No rash.   Neurological:      Mental Status: He is alert and oriented to person, place, and time.         ED Course                 Procedures              Critical Care time:  none               Results for orders placed or performed during the hospital encounter of 09/10/22 (from the past 24 hour(s))   XR Wrist Left G/E 3 Views    Narrative    EXAM: XR WRIST LEFT G/E 3 VIEWS  LOCATION: [facility]  DATE/TIME: 9/10/2022 9:16 PM    INDICATION: Left wrist swelling pain  COMPARISON: None.       Impression    IMPRESSION: No fracture or dislocation. Advanced degenerative changes at the STT and first CMC joints. Mild widening of the scapholunate interval.       Medications   acetaminophen (TYLENOL) tablet 1,000 mg (1,000 mg Oral Given 9/11/22 0106)   oxyCODONE (ROXICODONE) tablet 10 mg (10 mg Oral Given 9/11/22 0106)       Assessments & Plan (with Medical Decision Making)   77-year-old male presents for pain and swelling of the left wrist after a fall.  Vital signs are reassuring.  He is given acetaminophen and oxycodone for the pain.  X-ray of the wrist obtained, images reviewed independently as well as radiology read reviewed, no signs of fracture dislocation.  Given the patient's degree of symptoms I do believe that it is appropriate to splint him and have him get rechecked in 1 week.  He is safe to discharge with a prescription for oxycodone and instructions to return if worse.  The patient and his spouse are in agreement with this plan.    I have reviewed the nursing notes.    I have reviewed the findings, diagnosis, plan and need for follow up with the patient.       New Prescriptions    OXYCODONE (ROXICODONE) 5 MG TABLET    Take 1 tablet (5 mg) by mouth every 6 hours as needed for severe pain       Final diagnoses:   Left wrist pain       9/10/2022   [facility] EMERGENCY DEPT     Sam Hernandez MD  09/11/22 0144

## 2022-09-11 NOTE — ED TRIAGE NOTES
Left wrist/hand pain.  Patient tripped going up the stairs and hurt left wrist.  No other injuries.     Triage Assessment     Row Name 09/10/22 2100       Triage Assessment (Adult)    Airway WDL WDL       Respiratory WDL    Respiratory WDL WDL       Skin Circulation/Temperature WDL    Skin Circulation/Temperature WDL WDL       Cardiac WDL    Cardiac WDL WDL       Peripheral/Neurovascular WDL    Peripheral Neurovascular WDL WDL       Cognitive/Neuro/Behavioral WDL    Cognitive/Neuro/Behavioral WDL WDL

## 2022-09-11 NOTE — DISCHARGE INSTRUCTIONS
Wear the wrist brace for comfort.  If not feeling better over the next week get rechecked for repeat x-rays.  Use ice for 10 to 15 minutes at a time every couple of hours while awake to help with the pain and swelling.  Use acetaminophen 650 mg and oxycodone 5 mg every 4-6 hours as needed for the pain.

## 2022-09-15 ENCOUNTER — HOSPITAL ENCOUNTER (EMERGENCY)
Facility: CLINIC | Age: 77
Discharge: HOME | End: 2022-09-15
Attending: NURSE PRACTITIONER | Admitting: NURSE PRACTITIONER
Payer: COMMERCIAL

## 2022-09-15 ENCOUNTER — APPOINTMENT (OUTPATIENT)
Dept: GENERAL RADIOLOGY | Facility: CLINIC | Age: 77
End: 2022-09-15
Attending: NURSE PRACTITIONER
Payer: COMMERCIAL

## 2022-09-15 VITALS
RESPIRATION RATE: 24 BRPM | TEMPERATURE: 99.1 F | BODY MASS INDEX: 36.3 KG/M2 | SYSTOLIC BLOOD PRESSURE: 114 MMHG | DIASTOLIC BLOOD PRESSURE: 73 MMHG | OXYGEN SATURATION: 96 % | WEIGHT: 253 LBS | HEART RATE: 72 BPM

## 2022-09-15 DIAGNOSIS — S69.92XA HAND INJURY, LEFT, INITIAL ENCOUNTER: ICD-10-CM

## 2022-09-15 PROCEDURE — 99213 OFFICE O/P EST LOW 20 MIN: CPT | Performed by: NURSE PRACTITIONER

## 2022-09-15 PROCEDURE — 73110 X-RAY EXAM OF WRIST: CPT | Mod: LT

## 2022-09-15 PROCEDURE — G0463 HOSPITAL OUTPT CLINIC VISIT: HCPCS | Performed by: NURSE PRACTITIONER

## 2022-09-15 PROCEDURE — 250N000013 HC RX MED GY IP 250 OP 250 PS 637: Performed by: NURSE PRACTITIONER

## 2022-09-15 RX ORDER — OXYCODONE HYDROCHLORIDE 5 MG/1
5 TABLET ORAL EVERY 6 HOURS PRN
Qty: 10 TABLET | Refills: 0 | Status: SHIPPED | OUTPATIENT
Start: 2022-09-15 | End: 2022-09-18

## 2022-09-15 RX ORDER — ACETAMINOPHEN 500 MG
1000 TABLET ORAL ONCE
Status: COMPLETED | OUTPATIENT
Start: 2022-09-15 | End: 2022-09-15

## 2022-09-15 RX ORDER — OXYCODONE HYDROCHLORIDE 5 MG/1
5 TABLET ORAL ONCE
Status: COMPLETED | OUTPATIENT
Start: 2022-09-15 | End: 2022-09-15

## 2022-09-15 RX ADMIN — OXYCODONE HYDROCHLORIDE 5 MG: 5 TABLET ORAL at 13:31

## 2022-09-15 RX ADMIN — ACETAMINOPHEN 1000 MG: 500 TABLET, FILM COATED ORAL at 13:30

## 2022-09-15 ASSESSMENT — ACTIVITIES OF DAILY LIVING (ADL): ADLS_ACUITY_SCORE: 37

## 2022-09-15 NOTE — ED PROVIDER NOTES
History     Chief Complaint   Patient presents with     Fall     Was seen in ED a few days ago after fall and LUE injury. Pain getting worse and swelling not improving      HPI  Cas Noriega is a 77 year old male who presents to urgent care for persistent left wrist and hand pain.  Patient was seen on September 11 due to a hand/wrist injury.  Patient states he was ascending stairs and tripped, subsequently fell forward on an outstretched hand.  Patient denies any subsequent loss of consciousness or neurological sequelae.  Patient was seen, x-rayed, and placed in a removable wrist splint for comfort and a sling.  Patient states he has not been using the hand or arm due to pain.  Patient describes his pain level as a 2-3 with intermittent sharp stabbing pains of 6-8.  Patient has been taking his oxycodone as prescribed and reports it is helpful.  Patient states associated symptoms of swelling.  Patient denies loss of sensation in the fingers or hand.    Patient denies any changes in eating, drinking, urinating, stooling, breathing.      Allergies:  Allergies   Allergen Reactions     Nka [No Known Allergies]        Problem List:    Patient Active Problem List    Diagnosis Date Noted     Seizures (H) 08/05/2022     Priority: Medium     Added automatically from request for surgery [id number]       Major depressive disorder in partial remission, unspecified whether recurrent (H) 07/20/2022     Priority: Medium     Chronic atrial fibrillation (H) 07/01/2020     Priority: Medium     Started in hospital Nov 2019.  Seen again on June 2020 deliao.       Low serum vitamin B12 06/01/2020     Priority: Medium     During hospitalization Dec 2019.  Normal May 2020.       Atrial flutter, unspecified type (H) 06/01/2020     Priority: Medium     A flutter and A fib noted during Dec 2019 hospitalization in [place].  Was to be on eliquis.  Unclear to me if this all could have been related to his FUO, question of sepsis.  Will have do zio  and have addressed at July cardiology Follow-up.       Type 2 diabetes mellitus without complication, without long-term current use of insulin (H) 05/14/2020     Priority: Medium     Diagnosis 5/14/2020.       Obesity (BMI 35.0-39.9) with comorbidity (H) 05/16/2019     Priority: Medium     Encounter for long-term (current) use of antibiotics 01/12/2016     Priority: Medium     Life long for shoulder joint infection post op-duricef       Cochlear implant in place 12/22/2015     Priority: Medium     Cochlear implant placed on 12/17/15 by Dr. Dominguez Turpin: Right, [facility], [facility], #[id number].    Cochlear implant placed on 3/27/17 by Dr. Dominguez Turpin: Left, [facility], [facility], #[id number].       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       SEVERE MARTY (obstructive sleep apnea) 01/07/2015     Priority: Medium     [facility] 8/30/2005 AHI 46.8 Wes 80% CPAP       CAD (coronary artery disease) 02/27/2013     Priority: Medium     NM Lexiscan 8/10/2015: Small apical ischemia. Basal inferior nontransmural infarct with mild geoffrey-infarct ischemia. EF 50% with stress.    Cardiac echocardiogram 5/7/2014: Preserved LV systolic function, estimated EF 60-65%. No WMA. Trace MR. Trace to mild TR.    History of stent and CABG 2007.  Saw sleep specialist April 2017, severe MARTY felt stable, with plan of recheck in 2 yrs.  No chest pains, chest pressures, SOB or sudden change of endurance.   Notes some panting if doing >1 flight of stairs, but this is not a change.         S/P shoulder joint replacement 02/26/2013     Priority: Medium     Sensorineural hearing loss, bilateral 07/18/2012     Priority: Medium     Senile nuclear sclerosis 06/03/2012     Priority: Medium     Advance Care Planning 05/21/2012     Priority: Medium     Advance Care Planning 11/25/2015: Receipt of ACP document:  Received: Health Care Directive which was witnessed or  notarized on 12-19-12.  Document previously scanned on 11-10-15.  Validation form completed and sent to be scanned.  Code Status reflects choices in most recent ACP document.  Confirmed/documented designated decision maker(s).  Phone number needed for alternate health care agent.  Added by NORY HUGGINS  Discussed advance care planning with patient; information given to patient to review. 5/21/2012          COPD (chronic obstructive pulmonary disease) (H) 12/20/2010     Priority: Medium     Per hospitalization Dec 2019, [place] records.  Prev diagnosis intermittent asthma.       Rotator cuff disorder 06/23/2010     Priority: Medium     H/o rotator cuff repair on right-6/05  L shoulder surgery pending-Dr. Hartley until insurance changes       Generalized anxiety disorder 04/28/2010     Priority: Medium     Mild major depression (H) 04/28/2010     Priority: Medium     Essential hypertension      Priority: Medium     Acute myocardial infarction of other inferior wall, episode of care unspecified      Priority: Medium     2007 and 2008. He then had an inferior wall myocardial infarction and was taken emergently to [facility] were her received 2 coronary stents. He was then scheduled for bypass surgery. Underwent multi-bypass surgery with unfortunately no old records available.       Hyperlipidemia with target LDL less than 70      Priority: Medium     Diagnosis updated by automated process. Provider to review and confirm.       Restless legs syndrome (RLS)      Priority: Medium     Insomnia      Priority: Medium     Lumbago      Priority: Medium     decrease feeling in r foot,,epidural by spinal surgeon, no improvement,-2nd and third and fourth, stand long periods of time, shower, numb in left hip/upper thigh, gets back ache if stands too long  Recommended medication, pt did not want to use, recommended additional surgery, pt wants to hold          Past Medical History:    Past Medical History:    Diagnosis Date     Actinic keratosis      Acute myocardial infarction of other inferior wall, episode of care unspecified      Arthritis 1992     CAD (coronary artery disease) 2/27/2013     COPD (chronic obstructive pulmonary disease) (H) 1994     Diverticulitis of small intestine (without mention of hemorrhage) 2003     Generalized anxiety disorder 4/28/2010     Hyperlipidemia LDL goal < 70      Hypertension goal BP (blood pressure) < 140/90      Infection of prosthetic shoulder joint 4/1/2015     Insomnia      Intermittent asthma 12/20/2010     Lumbago      Mild major depression (H) 4/28/2010     MARTY (obstructive sleep apnea) 8/30/2005     Other specified disorders of rotator cuff syndrome of shoulder and allied disorders      Periodic limb movement disorder 2005     Prediabetes 5/16/2019     Restless legs syndrome (RLS)      Rotator cuff disorder 6/23/2010     S/P shoulder joint replacement 2/26/2013     Senile nuclear sclerosis 6/3/2012     Sensorineural hearing loss, bilateral 7/18/2012     Type 2 diabetes mellitus without complication, without long-term current use of insulin (H) 5/14/2020       Past Surgical History:    Past Surgical History:   Procedure Laterality Date     ARTHROPLASTY SHOULDER  2/26/2013    Procedure: ARTHROPLASTY SHOULDER;  Left Total Shoulder Arthropasty;  Surgeon: Xu Snell MD;  Location: [facility] OR     BACK SURGERY  1992    l5-s1 decompression & fusion w/instermintation     BREAST SURGERY  1990    benigen lump rmvd l brest     CARDIAC SURGERY  2000     CHOLECYSTECTOMY, LAPOROSCOPIC  3/2007    Cholecystectomy, Laparoscopic     COLONOSCOPY  8/2009    Benign polyp     COLONOSCOPY N/A 3/25/2015    Procedure: COLONOSCOPY;  Surgeon: Deejay Marie MD;  Location: [facility]     IMPLANT COCHLEA WITH NERVE INTEGRITY MONITOR Right 12/17/2015    Procedure: IMPLANT COCHLEA WITH NERVE INTEGRITY MONITOR;  Surgeon: Mickey Turpin MD;  Location:  OR     IMPLANT COCHLEA WITH NERVE  INTEGRITY MONITOR Left 3/27/2017    Procedure: IMPLANT COCHLEA WITH NERVE INTEGRITY MONITOR;  Surgeon: Mickey Turpin MD;  Location: [facility]     PHACOEMULSIFICATION WITH STANDARD INTRAOCULAR LENS IMPLANT  6/4/2012    Procedure:PHACOEMULSIFICATION WITH STANDARD INTRAOCULAR LENS IMPLANT; Left kelman phacoemulsification with intraocular lens implant; Surgeon:DAYDAY HILL; Location:[facility]     PHACOEMULSIFICATION WITH STANDARD INTRAOCULAR LENS IMPLANT  7/26/2012    Procedure: PHACOEMULSIFICATION WITH STANDARD INTRAOCULAR LENS IMPLANT;  Right Kelman phacoemulsification with intraocular lens implant;  Surgeon: Dayday Hill MD;  Location: [facility] OR     RELEASE CARPAL TUNNEL  10/23/2012    Procedure: RELEASE CARPAL TUNNEL;  Right carpal tunnel release;  Surgeon: Xu Snell MD;  Location: [facility]     RELEASE CARPAL TUNNEL  11/9/2012    Procedure: RELEASE CARPAL TUNNEL;  Left carpal tunnel release;  Surgeon: Xu Snell MD;  Location: [facility]     REMOVE COCHLEAR MAGNET Bilateral 8/31/2022    Procedure: Bilateral magnet removal from cochlear implants, placement of nonmagnetic plugs;  Surgeon: Yovana Murguia MD;  Location: [facility]     REMOVE HARDWARE ARTHROPLASTY SHOULDER  5/2/15    attempted and ended up doing bacteria removal     SURGICAL HISTORY OF -   3/1985    Bilateral Inguinal Herniorrhaphy     SURGICAL HISTORY OF -   5/1997    Breast Lumpectomy-Left-Benign     SURGICAL HISTORY OF -   6/10/2005    Rotator Cuff Repair     SURGICAL HISTORY OF -   10/2005    L5-S1 decompression and fusion with intrumentation     SURGICAL HISTORY OF -   2005    Right Shoulder Arthroplasty     SURGICAL HISTORY OF -   2006    PTCA with stent RCA     SURGICAL HISTORY OF -       Coronary Artery Bypass Graft     VASCULAR SURGERY  1992    2 stents b4 bypass       Family History:    Family History   Problem Relation Age of Onset     Cancer Father         bone cancer     Diabetes Son         2015     Asthma No family hx  of              Obesity No family hx of      Hypertension No family hx of        Social History:  Marital Status:   [2]  Social History     Tobacco Use     Smoking status: Former Smoker     Packs/day: 1.00     Years: 40.00     Pack years: 40.00     Types: Cigarettes, Cigarettes     Start date: 1956     Quit date: 1998     Years since quittin.7     Smokeless tobacco: Never Used   Vaping Use     Vaping Use: Never used   Substance Use Topics     Alcohol use: Yes     Comment: twice yearly     Drug use: No        Medications:    oxyCODONE (ROXICODONE) 5 MG tablet  albuterol (PROAIR HFA/PROVENTIL HFA/VENTOLIN HFA) 108 (90 Base) MCG/ACT inhaler  apixaban ANTICOAGULANT (ELIQUIS) 5 MG tablet  atenolol (TENORMIN) 50 MG tablet  atorvastatin (LIPITOR) 80 MG tablet  cefadroxil (DURICEF) 500 MG capsule  cyanocobalamin (VITAMIN B-12) 1000 MCG tablet  fluticasone (FLONASE) 50 MCG/ACT nasal spray  gabapentin (NEURONTIN) 800 MG tablet  glimepiride (AMARYL) 2 MG tablet  hydrochlorothiazide (HYDRODIURIL) 25 MG tablet  Lactobacillus (FLORAJEN ACIDOPHILUS) CAPS  levETIRAcetam (KEPPRA) 1000 MG tablet  lisinopril (ZESTRIL) 40 MG tablet  nitroGLYcerin (NITROSTAT) 0.4 MG sublingual tablet  order for DME  ORDER FOR DME  traZODone (DESYREL) 100 MG tablet  zonisamide (ZONEGRAN) 25 MG capsule          Review of Systems  As mentioned above in the history present illness. All other systems were reviewed and are negative.    Physical Exam   BP: 114/73  Pulse: 72  Temp: 99.1  F (37.3  C)  Resp: 24  Weight: 114.8 kg (253 lb)  SpO2: 96 %      Physical Exam  Vitals and nursing note reviewed.   Constitutional:       General: He is not in acute distress.     Appearance: He is well-developed. He is obese. He is not ill-appearing, toxic-appearing or diaphoretic.   HENT:      Head: Normocephalic and atraumatic.      Right Ear: Hearing and external ear normal.      Left Ear: Hearing and external ear normal.      Nose: Nose normal.    Eyes:      General: No scleral icterus.        Right eye: No discharge.         Left eye: No discharge.      Conjunctiva/sclera: Conjunctivae normal.   Cardiovascular:      Rate and Rhythm: Normal rate and regular rhythm.      Heart sounds: Normal heart sounds. No murmur heard.    No friction rub. No gallop.   Pulmonary:      Effort: Pulmonary effort is normal. No respiratory distress.      Breath sounds: Normal breath sounds. No stridor. No wheezing or rales.   Musculoskeletal:      Left hand: Swelling (generalized hand swelling ), tenderness (over metacarpal 1-2) and bony tenderness (over mid metacarpal 2) present. No deformity or lacerations. Decreased range of motion (due to pain - flexion of wrist decreased). Normal sensation. Normal capillary refill. Normal pulse.   Skin:     General: Skin is warm.      Capillary Refill: Capillary refill takes less than 2 seconds.      Coloration: Skin is not jaundiced.      Findings: No bruising, erythema, lesion or rash.   Neurological:      Mental Status: He is alert and oriented to person, place, and time.   Psychiatric:         Behavior: Behavior is cooperative.         ED Course                 Procedures      Results for orders placed or performed during the hospital encounter of 09/15/22 (from the past 24 hour(s))   XR Wrist Left G/E 3 Views    Narrative    XR WRIST LEFT G/E 3 VIEWS 9/15/2022 1:43 PM    HISTORY: persistent pain post fall    COMPARISON: None.      Impression    IMPRESSION: No acute fracture or dislocation. Advanced degenerative  changes at the STT and first CMC joints. Slight widening of the  scapholunate interval.     LUCI COHN MD         SYSTEM ID:  [id number]       Medications   acetaminophen (TYLENOL) tablet 1,000 mg (1,000 mg Oral Given 9/15/22 1330)   oxyCODONE (ROXICODONE) tablet 5 mg (5 mg Oral Given 9/15/22 1331)       Assessments & Plan (with Medical Decision Making)     I have reviewed the nursing notes.    I have reviewed the  findings, diagnosis, plan and need for follow up with the patient.  77-year-old male presents to urgent care for persistent left hand and wrist pain after a FOOSH type injury that occurred on September 11.  Patient seen on September 11, evaluated, x-ray was negative for fracture at that point in time.  Today on examination generalized swelling is noted, tenderness over the first and second metacarpal.  No obvious bruising, patient displays normal sensation to soft and firm touch and there is pain with flexion of the wrist.  Discussed with patient and offered CT on an outpatient basis, orthopedic follow-up on an outpatient basis versus further emergency room evaluation today versus repeat x-ray today.  Patient requests repeat x-ray today and CT on outpatient basis and orthopedic follow-up on an outpatient basis.  Repeat x-ray today reveals still no persistent fracture.  Placed patient in a wrist splint with thumb.  Discussed with patient the benefits of the splint and encouraged ice and discussed Tylenol 1000 mg 3 times a day and when pain is more severe sparing use of narcotics.  Orthopedic follow-up placed, CT ordered on an outpatient basis.  Patient discharged in stable condition.    Discharge Medication List as of 9/15/2022  2:29 PM          Final diagnoses:   Hand injury, left, initial encounter       9/15/2022   [facility] EMERGENCY DEPT     Leah Squires APRN CNP  09/15/22 1610       Leah Squires APRN CNP  09/15/22 1610

## 2022-09-15 NOTE — DISCHARGE INSTRUCTIONS
I recommend taking Tylenol 1000 mg by mouth 3 times a day at 8 AM, 1 PM, and 8 PM.  When pain is more severe than this you may take either 1 oxycodone or 1 hydrocodone every 6-8 hours.  This is a narcotic and can cause severe constipation and drowsiness and is highly addictive will.  Please use stool softeners and use with caution.  Follow-up with orthopedics for further evaluations.  I have ordered a CT scan to evaluate more closely the injury to rule out a fracture that is not seen via x-ray.

## 2022-09-21 ENCOUNTER — HOSPITAL ENCOUNTER (OUTPATIENT)
Dept: CT IMAGING | Facility: CLINIC | Age: 77
Discharge: HOME | End: 2022-09-21
Attending: NURSE PRACTITIONER | Admitting: NURSE PRACTITIONER
Payer: COMMERCIAL

## 2022-09-21 DIAGNOSIS — M25.539: ICD-10-CM

## 2022-09-21 PROCEDURE — 73200 CT UPPER EXTREMITY W/O DYE: CPT | Mod: LT

## 2022-09-23 ENCOUNTER — OFFICE VISIT (OUTPATIENT)
Dept: ORTHOPEDICS | Facility: CLINIC | Age: 77
End: 2022-09-23
Attending: NURSE PRACTITIONER
Payer: COMMERCIAL

## 2022-09-23 VITALS — RESPIRATION RATE: 20 BRPM | HEART RATE: 92 BPM

## 2022-09-23 DIAGNOSIS — M19.032 PRIMARY OSTEOARTHRITIS OF LEFT WRIST: Primary | ICD-10-CM

## 2022-09-23 PROCEDURE — 99203 OFFICE O/P NEW LOW 30 MIN: CPT | Performed by: PEDIATRICS

## 2022-09-23 NOTE — LETTER
9/23/2022         RE: Cas Noriega  [address]  [address]  [facility] [place] [address]        Dear Colleague,    Thank you for referring your patient, Cas Noriega, to the [facility]. Please see a copy of my visit note below.    ASSESSMENT & PLAN    Cas was seen today for injury.    Diagnoses and all orders for this visit:    Primary osteoarthritis of left wrist    Other orders  -     Orthopedic  Referral      No fractures noted on imaging.  Discussed options; he prefers monitoring for now. Not interested in injection.  Questions answered. Discussed signs and symptoms that may indicate more serious issues; the patient was instructed to seek appropriate care if noted. Tay indicates understanding of these issues and agrees with the plan.        See Patient Instructions  Patient Instructions   X-rays and CT scan of the left wrist show primarily degenerative changes, or arthritis.  This is most prominent in the first CMC joint, STT articulation, and at the radiocarpal joint (wrist itself).  We reviewed options with supporting the wrist, symptom management, therapy, injections.  Okay to continue with bracing if comfortable, but not required.  Otherwise, icing, over-the-counter medication such as acetaminophen (Tylenol) as needed for pain.  Discussed role of therapy; home exercises reviewed today.  If interested in seeing a hand therapist, contact clinic.  Finally, we discussed potential for steroid injection, done for pain.  Deferred for now, but if interested in this in the future, contact clinic.  Would suggest arranging with imaging guidance, given potential radiocarpal and STT joint component.  Otherwise, we will leave follow-up here open-ended.  Contact clinic if any questions or concerns.      If you have any further questions for your physician or physician s care team you can call [phone] and use option 3 to leave a voice message. Calls  received during business hours will be returned same day.        Eben Preston DO  [facility]      CC: Leah Squires      -----  Chief Complaint   Patient presents with     Left Wrist - Injury       SUBJECTIVE  Cas Noriega is a/an 77 year old male who is seen in consultation at the request of  Leah Squires C.N.P. for evaluation of left wrist.  Fell while going up the stairs in his 5th wheel.  Unsure of exact date.  ER visit on 9/11/22.  X. Rays were taken.  Repeat x rays taken 10 days later and recent CT scan done.   Currently in a thumb spica wrist brace.   Patient very hard of hearing and must read lips to understand.      The patient is seen with their wife.  The patient is Right handed    Onset:9/11/22 12 day(s) ago. Reports insidious onset without acute precipitating event.  Location of Pain: left wrist   Worsened by: use  Better with: brace   Treatments tried: casting/splinting/bracing  Associated symptoms: swelling    Orthopedic/Surgical history: NO  Social History/Occupation: retired    No family history pertinent to patient's problem today.     **  No pain at rest in brace currently.  When present, pain more radial wrist. Can increase and spread through radial hand, to base of thumb.    **  Primarily waiting on upcoming brain biopsy through [place], and then plans to head to [place] for winter.      REVIEW OF SYSTEMS:  Review of Systems   All other systems reviewed and are negative.        OBJECTIVE:  Pulse 92   Resp 20    General: alert and in no distress  HEENT: no scleral icterus or conjunctival erythema  Skin: no suspicious lesions or rash. No jaundice.  CV: distal perfusion intact   Resp: normal respiratory effort without conversational dyspnea   Psych: normal mood and affect  Gait: wheelchair  Neuro: Normal light sensory exam of left upper extremity       Hand/wrist (left):    Inspection:  No deformity noted.  Mild diffuse wrist swelling.  No ecchymosis.    Motion:  Forearm rotation without change in symptoms.  Wrist flexion mod limitation  Wrist extension mod limitation  Digit motion some discomfort with thumb motion, otherwise no change    Sensation:  Grossly intact light touch.    Radial pulses normal, +2/4, capillary refill brisk.    Palpation:  Tender radial wrist, snuffbox, first CMC joint    Thumb grind with some pain    RADIOLOGY:  I independently visualized and reviewed these images with the patient  X-rays and CT with arthrosis at first CMC joint, STT articulation, and radiocarpal joint.      CT WRIST LEFT WITHOUT CONTRAST, 9/21/2022 3:26 PM     TECHNIQUE: Helical CT images of the left wrist was performed without  contrast. Two-dimensional coronal and sagittal reformats were  performed. Dose reduction techniques were used.     COMPARISON: 9/15/2022 radiographs.     HISTORY: FOOSH injury, no fracture noted, persistent pain, rule out  scaphoid fracture; Persistent pain of wrist.     FINDINGS:  No fracture. Advanced degenerative changes at the first CMC joint.  Advanced degenerative changes at the STT joint with prominent cystic  change in the trapezoid. Prominent cystic change in the triquetrum and  capitate. Advanced degenerative changes at the radiocarpal joint. Mild  degenerative changes of the distal radioulnar joint.     No muscle atrophy.                                                                      IMPRESSION:      No fracture.      Advanced degenerative changes at the STT, first CMC and radiocarpal  joints.     Moderate widening of the scapholunate interval.      LUCI COHN MD              XR WRIST LEFT G/E 3 VIEWS 9/15/2022 1:43 PM     HISTORY: persistent pain post fall     COMPARISON: None.                                                                      IMPRESSION: No acute fracture or dislocation. Advanced degenerative  changes at the STT and first CMC joints. Slight widening of the  scapholunate interval.       LUCI COHN MD           EXAM: XR WRIST LEFT G/E 3 VIEWS  LOCATION: [facility]  DATE/TIME: 9/10/2022 9:16 PM     INDICATION: Left wrist swelling pain  COMPARISON: None.                                                                      IMPRESSION: No fracture or dislocation. Advanced degenerative changes at the STT and first CMC joints. Mild widening of the scapholunate interval.        Review of prior external note(s) from - previous eval  Review of the result(s) of each unique test - imaging  Independent interpretation of a test performed by another physician/other qualified health care professional (not separately reported) - imaging          Again, thank you for allowing me to participate in the care of your patient.        Sincerely,        Eben Preston,

## 2022-09-23 NOTE — PATIENT INSTRUCTIONS
X-rays and CT scan of the left wrist show primarily degenerative changes, or arthritis.  This is most prominent in the first CMC joint, STT articulation, and at the radiocarpal joint (wrist itself).  We reviewed options with supporting the wrist, symptom management, therapy, injections.  Okay to continue with bracing if comfortable, but not required.  Otherwise, icing, over-the-counter medication such as acetaminophen (Tylenol) as needed for pain.  Discussed role of therapy; home exercises reviewed today.  If interested in seeing a hand therapist, contact clinic.  Finally, we discussed potential for steroid injection, done for pain.  Deferred for now, but if interested in this in the future, contact clinic.  Would suggest arranging with imaging guidance, given potential radiocarpal and STT joint component.  Otherwise, we will leave follow-up here open-ended.  Contact clinic if any questions or concerns.      If you have any further questions for your physician or physician s care team you can call [phone] and use option 3 to leave a voice message. Calls received during business hours will be returned same day.

## 2022-09-23 NOTE — PROGRESS NOTES
ASSESSMENT & PLAN    Cas was seen today for injury.    Diagnoses and all orders for this visit:    Primary osteoarthritis of left wrist    Other orders  -     Orthopedic  Referral      No fractures noted on imaging.  Discussed options; he prefers monitoring for now. Not interested in injection.  Questions answered. Discussed signs and symptoms that may indicate more serious issues; the patient was instructed to seek appropriate care if noted. Tay indicates understanding of these issues and agrees with the plan.        See Patient Instructions  Patient Instructions   X-rays and CT scan of the left wrist show primarily degenerative changes, or arthritis.  This is most prominent in the first CMC joint, STT articulation, and at the radiocarpal joint (wrist itself).  We reviewed options with supporting the wrist, symptom management, therapy, injections.  Okay to continue with bracing if comfortable, but not required.  Otherwise, icing, over-the-counter medication such as acetaminophen (Tylenol) as needed for pain.  Discussed role of therapy; home exercises reviewed today.  If interested in seeing a hand therapist, contact clinic.  Finally, we discussed potential for steroid injection, done for pain.  Deferred for now, but if interested in this in the future, contact clinic.  Would suggest arranging with imaging guidance, given potential radiocarpal and STT joint component.  Otherwise, we will leave follow-up here open-ended.  Contact clinic if any questions or concerns.      If you have any further questions for your physician or physician s care team you can call [phone] and use option 3 to leave a voice message. Calls received during business hours will be returned same day.        Eben Preston DO  [facility]      CC: Leah Squires      -----  Chief Complaint   Patient presents with     Left Wrist - Injury       SUBJECTIVE  Cas Noriega is  a/an 77 year old male who is seen in consultation at the request of  Leah Squires C.N.P. for evaluation of left wrist.  Fell while going up the stairs in his 5th wheel.  Unsure of exact date.  ER visit on 9/11/22.  X. Rays were taken.  Repeat x rays taken 10 days later and recent CT scan done.   Currently in a thumb spica wrist brace.   Patient very hard of hearing and must read lips to understand.      The patient is seen with their wife.  The patient is Right handed    Onset:9/11/22 12 day(s) ago. Reports insidious onset without acute precipitating event.  Location of Pain: left wrist   Worsened by: use  Better with: brace   Treatments tried: casting/splinting/bracing  Associated symptoms: swelling    Orthopedic/Surgical history: NO  Social History/Occupation: retired    No family history pertinent to patient's problem today.     **  No pain at rest in brace currently.  When present, pain more radial wrist. Can increase and spread through radial hand, to base of thumb.    **  Primarily waiting on upcoming brain biopsy through [place], and then plans to head to [place] for winter.      REVIEW OF SYSTEMS:  Review of Systems   All other systems reviewed and are negative.        OBJECTIVE:  Pulse 92   Resp 20    General: alert and in no distress  HEENT: no scleral icterus or conjunctival erythema  Skin: no suspicious lesions or rash. No jaundice.  CV: distal perfusion intact   Resp: normal respiratory effort without conversational dyspnea   Psych: normal mood and affect  Gait: wheelchair  Neuro: Normal light sensory exam of left upper extremity       Hand/wrist (left):    Inspection:  No deformity noted.  Mild diffuse wrist swelling. No ecchymosis.    Motion:  Forearm rotation without change in symptoms.  Wrist flexion mod limitation  Wrist extension mod limitation  Digit motion some discomfort with thumb motion, otherwise no change    Sensation:  Grossly intact light touch.    Radial pulses normal, +2/4,  capillary refill brisk.    Palpation:  Tender radial wrist, snuffbox, first CMC joint    Thumb grind with some pain    RADIOLOGY:  I independently visualized and reviewed these images with the patient  X-rays and CT with arthrosis at first CMC joint, STT articulation, and radiocarpal joint.      CT WRIST LEFT WITHOUT CONTRAST, 9/21/2022 3:26 PM     TECHNIQUE: Helical CT images of the left wrist was performed without  contrast. Two-dimensional coronal and sagittal reformats were  performed. Dose reduction techniques were used.     COMPARISON: 9/15/2022 radiographs.     HISTORY: FOOSH injury, no fracture noted, persistent pain, rule out  scaphoid fracture; Persistent pain of wrist.     FINDINGS:  No fracture. Advanced degenerative changes at the first CMC joint.  Advanced degenerative changes at the STT joint with prominent cystic  change in the trapezoid. Prominent cystic change in the triquetrum and  capitate. Advanced degenerative changes at the radiocarpal joint. Mild  degenerative changes of the distal radioulnar joint.     No muscle atrophy.                                                                      IMPRESSION:      No fracture.      Advanced degenerative changes at the STT, first CMC and radiocarpal  joints.     Moderate widening of the scapholunate interval.      LUCI COHN MD              XR WRIST LEFT G/E 3 VIEWS 9/15/2022 1:43 PM     HISTORY: persistent pain post fall     COMPARISON: None.                                                                      IMPRESSION: No acute fracture or dislocation. Advanced degenerative  changes at the STT and first CMC joints. Slight widening of the  scapholunate interval.      LUCI COHN MD           EXAM: XR WRIST LEFT G/E 3 VIEWS  LOCATION: [facility]  DATE/TIME: 9/10/2022 9:16 PM     INDICATION: Left wrist swelling pain  COMPARISON: None.                                                                       IMPRESSION: No fracture or dislocation. Advanced degenerative changes at the STT and first CMC joints. Mild widening of the scapholunate interval.        Review of prior external note(s) from - previous eval  Review of the result(s) of each unique test - imaging  Independent interpretation of a test performed by another physician/other qualified health care professional (not separately reported) - imaging

## 2022-09-25 NOTE — OP NOTE
Date: August 31, 2022    Preoperative diagnosis: Seizures with suspected primary brain tumor, bilateral cochlear implants in place that are not MRI compatible, need for MRI without cochlear implant magnets in place    Postoperative diagnosis: Seizures with suspected primary brain tumor, bilateral cochlear implants in place that are not MRI compatible, need for MRI without cochlear implant magnets in place    Procedure: Bilateral removal of cochlear implant magnets with bilateral replacement with nonmagnetic spacers    Surgeon: Yovana Murguia MD    Anesthesia: General    Estimated blood loss: Less than 5 mL    Specimens: None    Intraoperative complications: None    Indications for procedure: Cas Noriega is a 77-year-old man who has bilateral cochlear implants.  The right cochlear implant was placed by Dr. Turpin and is an Advanced Bionics HiRes 90k device.  On imaging, the right cochlear implant is in appropriate position.  The left cochlear implant was replaced in [place] within the last year and is an Advanced Bionics 3D model device and preoperative imaging demonstrated that only half of the electrodes are inside the cochlea.  He has developed seizures and initial imaging at [facility] [place] [facility] is suggestive of a primary brain tumor for which he requires full characterization via MRI without the magnets in place.  Therefore after discussing the common and serious risks in detail, the patient is undergoing bilateral magnet removal with placement of magnetic spacers.    Intraoperative findings: The cochlear implant magnets were removed successfully on both sides without disturbing the integrity of the implant silicone.  The left ear underwent placement of a 3D model plug.  The right ear had a hires 90 K model dummy placed.    Procedure: The patient was brought to the operating room and placed supine on the operating room table.  General endotracheal anesthesia was induced.  This was done under general anesthesia as  the patient's hearing loss and mental status alterations due to suspected tumor made it unlikely that we could successfully position him and maintain a stationary position for the procedure.  The table was turned 180 degrees.  Using the patient's cochlear implant processors, the site of the  stimulator was outlined bilaterally to identify the site of the magnet.  The regions were shaved and incisions were designed to be semicircular and located approximately 1 cm posterior lateral or superior to the implant.  Local anesthesia was administered with 1% lidocaine with 1 100,000 epinephrine.  We began the procedure with the left ear.  It was prepped and draped in the sterile fashion.  The semicircular incision was carried down through the skin and subcutaneous tissue to the level of the loose areolar tissue.  Palpation was performed to confirm the site of the implant perimeter.  The tissue was then incised and dissection performed to identify the silicone casing.  This plane was developed and the flap was retracted to expose the cochlear implant magnet.  The field was flooded with sterile saline for lubrication and the magnet was removed by gently elevating the silicone lip with an elevator and working circumferentially until the magnet could be gently teased around the edge and then removed.  The field was irrigated and then the nonmagnetic spacer was placed by introducing it edged underneath the silicone lip and then moving back and forth with the annulus elevator to place the lip lateral to this while stability was achieved with an additional assistant holding the spacer in place with the freer elevator.  The spacer was inserted and the lip was fully inspected and there was no loss of integrity.  The wound was then irrigated and the incision was closed in multiple layers of 3-0 Vicryl suture followed by subcuticular 4-0 Monocryl.  Steri-Strips were applied and a pressure dressing was placed.    The neck was  then gently rotated to expose the right side which was prepped and draped in usual sterile fashion.  The semicircular incision was brought down through the skin and subcutaneous tissue to the loose areolar tissue.  This was elevated again to identify the edge of the implant and the tissue was incised there and the plane developed to expose the magnet site.  The magnet was removed as described for the left side and the area copiously irrigated and the nonmagnetic spacer matching this implant model was then placed without incident.  The silicone casing was inspected and was found to have maintained its integrity.  After further irrigation the wound was similarly closed with interrupted 3-0 Vicryl sutures followed by subcuticular 4-0 Monocryl.  Steri-Strips were applied and a pressure dressing was secured.  The patient was then turned back over to anesthesia in stable condition and recovered uneventfully in the postanesthesia care unit.    KENNY Murguia performed all portions of the procedure.

## 2022-10-13 ENCOUNTER — OFFICE VISIT (OUTPATIENT)
Dept: AUDIOLOGY | Facility: CLINIC | Age: 77
End: 2022-10-13
Payer: COMMERCIAL

## 2022-10-13 DIAGNOSIS — H90.3 SENSORY HEARING LOSS, BILATERAL: Primary | ICD-10-CM

## 2022-10-13 PROCEDURE — 92700 UNLISTED ORL SERVICE/PX: CPT | Performed by: AUDIOLOGIST

## 2022-10-13 NOTE — PROGRESS NOTES
AUDIOLOGY REPORT    BACKGROUND INFORMATION: Dr. Mickey Turpin implanted Cas Noriega with a right  Advanced [employer] HiRes 90k Advantage midscala MS cochlear implant (CI) on 12/17/2015 and a left HiRes 90k Ultra CI on 3/27/2017 due to bilateral severe to profound sensorineural hearing loss and lack of benefit from hearing aids. The patient is being seen for troubleshooting in Audiology at [facility]. 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     He had his magnets removed on 8/17/22 in preparation of an MRI after multiple seizures and discovering of a brain lesion.      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 They will be returning to [place] soon. His right processor is non functioning. He is wearing the left one, however does not hear very well with it.    FITTING SESSION: Dr. Yovana Murguia, cochlear implant surgeon, ordered today's appointment. The patient came to the clinic for adjustment to the programs in the external speech processor and for assessment of the external components of the cochlear implant system. These components provide power and data to the internal device. Sound is only heard once the external portion is activated. Postoperative treatment, including device fitting and adjustment, audiologic assessments, and training are required at regular intervals. Testing can include electropsychophysical measures of threshold, comfort, and loudness balancing, which are completed to update the program.    Processor type: Lydia CI Q90  Headpiece type: UHP  Magnet strength: Right: 3, Left: 3    TEST RESULTS:   20 minutes were spent evaluating rehab status today. Speech perception testing was not completed.  Aided thresholds: Aided thresholds with right CI in mild hearing loss range. Prior to programming  "aided thresholds with the left were in the mild range from 250-1k and then no responses from 1.5-6 kHz. After programming aided thresholds in the left were in the mild hearing loss range from 250-4k sloping to severe at 6 kHz.  Tympanometry: Normal bilaterally (note tracings were lost due to equipment issue)  Electrode Impedances: Right: Stable and within tolerances, Left: Electrodes 10-11 open  Neural Response Testing: Not tested today  Facial Stimulation: Absent  Tinnitus: Present  Balance Problems: Absent  Pain/Discomfort: Absent  Strategies Tried: Optima P, Optima S  Strategy Preference: Right: Optima P, Left: Optima S    Number of Channels per Program: Right: 14 (electrodes 15 and 16 disabled). Left: 9 (electrodes 10-16 disabled today due to open electrodes and no audibility 12-16)     COMMENTS: His equipment is out of warranty and when I attempted to connect the processor to the software the error message \"connection issue\" was displayed. I was not able to get the processor to connect to the software or his battery. Due to the nature of his illness, I gave him a long term loaner processor and cable to use. We turned on the indicator status light so Zee would be able to tell if the headpiece is not communicating with the internal. We discussed retention options and I showed them toupee tape and a headband to use.    SUMMARY AND RECOMMENDATIONS: Cas was seen for cochlear implant troubleshooting today. He will contact us if he has equipment issues in [place] or follow up with the managing audiologist there, sooner if concerns arise. Please call this clinic with questions regarding these results or recommendations.        Peña Childress, [facility] [place]  Licensed Audiologist  [place] License #[id number]    "

## 2022-10-29 ENCOUNTER — HEALTH MAINTENANCE LETTER (OUTPATIENT)
Age: 77
End: 2022-10-29

## 2022-12-11 ENCOUNTER — HEALTH MAINTENANCE LETTER (OUTPATIENT)
Age: 77
End: 2022-12-11

## 2023-04-08 ENCOUNTER — HEALTH MAINTENANCE LETTER (OUTPATIENT)
Age: 78
End: 2023-04-08

## 2023-05-06 NOTE — PATIENT INSTRUCTIONS
"Spells -  Call neurology to update them on your \"trips\" - they may want to adjust dosing    Sleep -  Mix up in dose, you were taking 900 mg  Try 800 mg to see if do well enough - single pill   Update me if not doing well enough  Can try off trazodone to see if you really need it    A fib -  Decided to stay on eliquis for now  Can get me your drug formulary if want to check for cheaper costs.  Discussed cheap coumadin option, but not great option especially with upcoming move     If here, recheck in 6 months if doing well   " 04-May-2023 06:15

## 2023-06-25 ENCOUNTER — HEALTH MAINTENANCE LETTER (OUTPATIENT)
Age: 78
End: 2023-06-25

## 2023-09-03 ENCOUNTER — HEALTH MAINTENANCE LETTER (OUTPATIENT)
Age: 78
End: 2023-09-03

## 2024-01-21 ENCOUNTER — HEALTH MAINTENANCE LETTER (OUTPATIENT)
Age: 79
End: 2024-01-21

## 2024-06-09 ENCOUNTER — HEALTH MAINTENANCE LETTER (OUTPATIENT)
Age: 79
End: 2024-06-09

## 2024-08-18 ENCOUNTER — HEALTH MAINTENANCE LETTER (OUTPATIENT)
Age: 79
End: 2024-08-18

## 2024-10-27 ENCOUNTER — HEALTH MAINTENANCE LETTER (OUTPATIENT)
Age: 79
End: 2024-10-27

## 2025-02-01 ENCOUNTER — HEALTH MAINTENANCE LETTER (OUTPATIENT)
Age: 80
End: 2025-02-01

## 2025-03-07 NOTE — OR NURSING
MD Saxton called. Verbalized would put sign out in for patient.   
Pt recently had covid on 8/14/22.  
28-Feb-2025 10:10
07-Mar-2025 17:59

## 2025-05-04 ENCOUNTER — HEALTH MAINTENANCE LETTER (OUTPATIENT)
Age: 80
End: 2025-05-04

## 2025-08-17 ENCOUNTER — HEALTH MAINTENANCE LETTER (OUTPATIENT)
Age: 80
End: 2025-08-17

## (undated) DEVICE — GLOVE SENSICARE PI POWDER FREE 7.0 LATEX FREE MSG9070

## (undated) DEVICE — PREP POVIDONE IODINE SOLUTION 10% 120ML

## (undated) DEVICE — SYR BULB IRRIG DOVER 60 ML LATEX FREE 67000

## (undated) DEVICE — STPL SKIN 35W ROTATING HEAD PRW35

## (undated) DEVICE — PREP POVIDONE-IODINE 7.5% SCRUB 4OZ BOTTLE MDS093945

## (undated) DEVICE — LINEN TOWEL PACK X5 5464

## (undated) DEVICE — PEN MARKING W/RULER DYNJSM04

## (undated) DEVICE — SPONGE RAY-TEC 4X8" 7318

## (undated) DEVICE — DRSG STERI STRIP 1/2X4" R1547

## (undated) DEVICE — ESU GROUND PAD ADULT W/CORD E7507

## (undated) DEVICE — PREP SKIN SCRUB TRAY 4461A

## (undated) DEVICE — BLADE CLIPPER SGL USE 9680

## (undated) DEVICE — ADH LIQUID MASTISOL TOPICAL VIAL 2-3ML 0523-48

## (undated) DEVICE — PREP POVIDONE IODINE SOLUTION 10% 4OZ BOTTLE 29906-004

## (undated) DEVICE — LINEN GOWN XLG 5407

## (undated) DEVICE — SU MONOCRYL 4-0 PS-2 27" UND Y426H

## (undated) DEVICE — PREP POVIDONE IODINE SCRUB 7.5% 120ML

## (undated) DEVICE — DRAPE POUCH IRR 1016

## (undated) DEVICE — LABEL MEDICATION SYSTEM 3303-P

## (undated) DEVICE — NDL 27GA 1.25" 305136

## (undated) DEVICE — PACK NEURO MINOR UMMC SNE32MNMU4

## (undated) DEVICE — SOL NACL 0.9% IRRIG 3000ML BAG 2B7477

## (undated) DEVICE — DRAPE MICROSCOPE LEICA 54X120" 09-MK653

## (undated) DEVICE — DRAPE STOCKINETTE IMPERVIOUS 10" 21048

## (undated) DEVICE — SUCTION SYSTEM LEVINE IRRIGATION SP-3000

## (undated) DEVICE — DRSG GLASSCOCK ADULT S-1000

## (undated) DEVICE — SYR 10ML SLIP TIP W/O NDL 303134

## (undated) DEVICE — DRAPE POUCH INSTRUMENT 1018

## (undated) DEVICE — BUR BALL 1.5MM DIAMOND COURSE EXT ANSPACH S-15DC

## (undated) DEVICE — LINEN TOWEL PACK X6 WHITE 5487

## (undated) DEVICE — WIPE INSTRUMENT MEROCEL 400200

## (undated) DEVICE — DRSG GAUZE 4X4" TRAY 6939

## (undated) DEVICE — DRAPE IOBAN INCISE 13X13" 6640EZ

## (undated) DEVICE — SPONGE SURGIFOAM 100 1974

## (undated) DEVICE — BUR BALL 4MM DIAMOND COARSE ANSPACH S-4DC-G1

## (undated) DEVICE — SUCTION MANIFOLD NEPTUNE 2 SYS 4 PORT 0702-020-000

## (undated) DEVICE — DRAPE STERI TOWEL SM 1000

## (undated) DEVICE — DECANTER BAG 2002S

## (undated) DEVICE — BUR BALL 3MM DIAMOND COARSE EXT ANSPACH S-3DC-G1

## (undated) DEVICE — TONGUE DEPRESSOR STERILE 25-705-ALL

## (undated) DEVICE — SU VICRYL 3-0 X-1 27" UND J458H

## (undated) DEVICE — RX BACITRACIN OINTMENT 0.9G 1/32OZ CUR001109

## (undated) DEVICE — BLADE KNIFE BEAVER TYMPANOPLASTY 2.5MM W/60D DOWN 377200

## (undated) DEVICE — TUBING SUCTION MEDI-VAC SOFT 3/16"X20' N520A

## (undated) DEVICE — STRAP ARMBOARD IV POSITIONING 1.5X32" VELCRO 31142980

## (undated) DEVICE — GLOVE GAMMEX NEOPRENE ULTRA SZ 7 LF 8514

## (undated) DEVICE — GLOVE LINER HALF FINGER NYLON KP5015/50

## (undated) DEVICE — DRAPE MICROSCOPE LEICA 54X150" AR8033650

## (undated) DEVICE — SOL NACL 0.9% 10ML VIAL 0409-4888-02

## (undated) DEVICE — BUR BALL 2MM DIAMOND COARSE EXT ANSPACH S-2DC-G1

## (undated) DEVICE — DRAPE U SPLIT 74X120" 29440

## (undated) DEVICE — BUR BALL 6MM CARBIDE ANSPACH S-6B-C-G1

## (undated) DEVICE — BUR BALL 1MM DIAMOND COARSE EXT ANSPACH S-1DC

## (undated) DEVICE — BNDG KLING 3" 2232

## (undated) DEVICE — SU VICRYL 3-0 RB-1 27" UND J215H

## (undated) DEVICE — BUR BALL 4MM CARBIDE EXT LENGTH ANSPACH S-4BL-C-G1

## (undated) RX ORDER — FENTANYL CITRATE 50 UG/ML
INJECTION, SOLUTION INTRAMUSCULAR; INTRAVENOUS
Status: DISPENSED
Start: 2017-03-27

## (undated) RX ORDER — FENTANYL CITRATE-0.9 % NACL/PF 10 MCG/ML
PLASTIC BAG, INJECTION (ML) INTRAVENOUS
Status: DISPENSED
Start: 2022-08-31

## (undated) RX ORDER — OXYCODONE HYDROCHLORIDE 5 MG/1
TABLET ORAL
Status: DISPENSED
Start: 2017-03-27

## (undated) RX ORDER — HYDROMORPHONE HYDROCHLORIDE 1 MG/ML
INJECTION, SOLUTION INTRAMUSCULAR; INTRAVENOUS; SUBCUTANEOUS
Status: DISPENSED
Start: 2017-03-27

## (undated) RX ORDER — FENTANYL CITRATE 50 UG/ML
INJECTION, SOLUTION INTRAMUSCULAR; INTRAVENOUS
Status: DISPENSED
Start: 2022-08-31

## (undated) RX ORDER — ONDANSETRON 2 MG/ML
INJECTION INTRAMUSCULAR; INTRAVENOUS
Status: DISPENSED
Start: 2017-03-27

## (undated) RX ORDER — CEFAZOLIN SODIUM 2 G/100ML
INJECTION, SOLUTION INTRAVENOUS
Status: DISPENSED
Start: 2017-03-27

## (undated) RX ORDER — ONDANSETRON 2 MG/ML
INJECTION INTRAMUSCULAR; INTRAVENOUS
Status: DISPENSED
Start: 2022-08-31

## (undated) RX ORDER — CIPROFLOXACIN AND DEXAMETHASONE 3; 1 MG/ML; MG/ML
SUSPENSION/ DROPS AURICULAR (OTIC)
Status: DISPENSED
Start: 2022-08-31

## (undated) RX ORDER — CEFUROXIME SODIUM 1.5 G/16ML
INJECTION, POWDER, FOR SOLUTION INTRAVENOUS
Status: DISPENSED
Start: 2022-08-31

## (undated) RX ORDER — ALBUTEROL SULFATE 0.83 MG/ML
SOLUTION RESPIRATORY (INHALATION)
Status: DISPENSED
Start: 2017-03-27

## (undated) RX ORDER — LIDOCAINE HYDROCHLORIDE AND EPINEPHRINE 10; 10 MG/ML; UG/ML
INJECTION, SOLUTION INFILTRATION; PERINEURAL
Status: DISPENSED
Start: 2022-08-31

## (undated) RX ORDER — BACITRACIN 500 [USP'U]/G
OINTMENT OPHTHALMIC
Status: DISPENSED
Start: 2017-03-27

## (undated) RX ORDER — EPINEPHRINE NASAL SOLUTION 1 MG/ML
SOLUTION NASAL
Status: DISPENSED
Start: 2022-08-31

## (undated) RX ORDER — EPINEPHRINE NASAL SOLUTION 1 MG/ML
SOLUTION NASAL
Status: DISPENSED
Start: 2017-03-27

## (undated) RX ORDER — PROPOFOL 10 MG/ML
INJECTION, EMULSION INTRAVENOUS
Status: DISPENSED
Start: 2022-08-31